# Patient Record
Sex: FEMALE | Race: WHITE | Employment: FULL TIME | URBAN - METROPOLITAN AREA
[De-identification: names, ages, dates, MRNs, and addresses within clinical notes are randomized per-mention and may not be internally consistent; named-entity substitution may affect disease eponyms.]

---

## 2019-11-06 ENCOUNTER — OFFICE VISIT (OUTPATIENT)
Dept: ORTHOPEDIC SURGERY | Facility: CLINIC | Age: 59
End: 2019-11-06

## 2019-11-06 VITALS
TEMPERATURE: 95.4 F | BODY MASS INDEX: 36.57 KG/M2 | HEART RATE: 85 BPM | WEIGHT: 233 LBS | RESPIRATION RATE: 16 BRPM | DIASTOLIC BLOOD PRESSURE: 79 MMHG | SYSTOLIC BLOOD PRESSURE: 123 MMHG | HEIGHT: 67 IN

## 2019-11-06 DIAGNOSIS — M17.12 PRIMARY OSTEOARTHRITIS OF LEFT KNEE: ICD-10-CM

## 2019-11-06 DIAGNOSIS — E66.01 SEVERE OBESITY (HCC): ICD-10-CM

## 2019-11-06 DIAGNOSIS — Z82.49 FAMILY HISTORY OF CHF (CONGESTIVE HEART FAILURE): ICD-10-CM

## 2019-11-06 DIAGNOSIS — M25.562 LEFT KNEE PAIN, UNSPECIFIED CHRONICITY: Primary | ICD-10-CM

## 2019-11-06 RX ORDER — ERGOCALCIFEROL 1.25 MG/1
50000 CAPSULE ORAL
COMMUNITY

## 2019-11-06 RX ORDER — METFORMIN HYDROCHLORIDE 1000 MG/1
1000 TABLET ORAL 2 TIMES DAILY WITH MEALS
COMMUNITY

## 2019-11-06 RX ORDER — BETAMETHASONE SODIUM PHOSPHATE AND BETAMETHASONE ACETATE 3; 3 MG/ML; MG/ML
6 INJECTION, SUSPENSION INTRA-ARTICULAR; INTRALESIONAL; INTRAMUSCULAR; SOFT TISSUE ONCE
Qty: 1 ML | Refills: 0
Start: 2019-11-06 | End: 2019-11-06

## 2019-11-06 RX ORDER — TRAMADOL HYDROCHLORIDE 50 MG/1
50 TABLET ORAL
COMMUNITY
End: 2020-07-28

## 2019-11-06 NOTE — PROGRESS NOTES
Patient: Monique Bhat                MRN: [id number]       SSN: [id number]  YOB: 1960        AGE: 63 y.o. SEX: female  Body mass index is 36.49 kg/m². PCP: None  11/06/19    HISTORY OF PRESENT ILLNESS:  I had the pleasure of reviewing Ms. Karen Dawson for opinion and advice in regards to her knees. She has had arthroscopies many years ago and the left knee is the worse of the two. They can be severe. Every step is painful. She does poorly at the grocery store. Difficulties with stairs. She has to go backwards going down stairs. She has pain at night, pain with activities of daily living. She is very restricted with them. The last injection lasted about 8 months. She is diabetic. She is otherwise quite healthy. She has been trying to lose some weight, but cannot exercise. Denies fevers, chills, night sweats. She is otherwise feeling well. REVIEW OF SYSTEMS:  A 12-point review of systems performed today. Pertinent positives noted. All other systems reviewed and otherwise are negative. PHYSICAL EXAMINATION:  On examination today, she is a very nice lady. Body mass index is 36.5. She moves her head and neck adequately. There is no respiratory compromise or indrawing. No scleral icterus or JVD. EOM normal.  The hips rotate nicely. Both knees are in varus. She walks with an antalgic gait going to the left side. She holds her leg fairly stiffly. She has a significant lateral thrust.  About a 3 degree fixed flexion deformity. She bends to about 110 degrees. Calf nontender. Mild Baker's cyst.  There is no footdrop. No cyanosis, peripheral edema or clubbing. Good pulse present distally. The right knee is similar although less severe. RADIOGRAPHS:  AP, tunnel, lateral and skyline confirm endstage arthritis with complete bone on bone contact medial and patellofemoral compartments with a lateral thrust and some mild lateral subluxation of the tibia and the femur. PROCEDURE NOTE:  Under aseptic conditions and after informed written consent with time out, left knee injected 1 mL Celestone preparation, i.e. 6 mg. IMPRESSION:  Endstage arthritis of the left knee. I think she should have a knee replacement when she would like. She would like to have an injection for now. She will return to see us next week and we can place an injection for the right knee as well. It is really up to her when she would like to have her knee replaced. CC:  Family Medicine         REVIEW OF SYSTEMS:      CON: negative for weight loss, fever  EYE: negative for double vision  ENT: negative for hoarseness  RS:   negative for Tb  GI:    negative for blood in stool  :  negative for blood in urine  Other systems reviewed and noted below. History reviewed. No pertinent past medical history. History reviewed. No pertinent family history. Current Outpatient Medications   Medication Sig Dispense Refill    metFORMIN (GLUCOPHAGE) 1,000 mg tablet Take 1,000 mg by mouth two (2) times daily (with meals).  traMADol (ULTRAM) 50 mg tablet Take 50 mg by mouth every six (6) hours as needed for Pain.  dulaglutide (TRULICITY) 1.5 FV/1.1 mL sub-q pen 1.5 mg by SubCUTAneous route every seven (7) days.  ergocalciferol (VITAMIN D2) 50,000 unit capsule Take 50,000 Units by mouth.  betamethasone (CELESTONE SOLUSPAN) 6 mg/mL injection 1 mL by Intra artICUlar route once for 1 dose. 1 mL 0       Not on File    History reviewed. No pertinent surgical history.     Social History     Socioeconomic History    Marital status: UNKNOWN     Spouse name: Not on file    Number of children: Not on file    Years of education: Not on file    Highest education level: Not on file   Occupational History    Not on file   Social Needs    Financial resource strain: Not on file    Food insecurity:     Worry: Not on file     Inability: Not on file    Transportation needs:     Medical: Not on file     Non-medical: Not on file   Tobacco Use    Smoking status: Never Smoker    Smokeless tobacco: Never Used   Substance and Sexual Activity    Alcohol use: Not on file    Drug use: Not on file    Sexual activity: Not on file   Lifestyle    Physical activity:     Days per week: Not on file     Minutes per session: Not on file    Stress: Not on file   Relationships    Social connections:     Talks on phone: Not on file     Gets together: Not on file     Attends Mandaeism service: Not on file     Active member of club or organization: Not on file     Attends meetings of clubs or organizations: Not on file     Relationship status: Not on file    Intimate partner violence:     Fear of current or ex partner: Not on file     Emotionally abused: Not on file     Physically abused: Not on file     Forced sexual activity: Not on file   Other Topics Concern    Not on file   Social History Narrative    Not on file       Visit Vitals  /79 (BP 1 Location: Left arm, BP Patient Position: Sitting)   Pulse 85   Temp 95.4 °F (35.2 °C) (Oral)   Resp 16   Ht 5' 7\" (1.702 m)   Wt 233 lb (105.7 kg)   BMI 36.49 kg/m²         PHYSICAL EXAMINATION:  GENERAL: Alert and oriented x3, in no acute distress, well-developed, well-nourished, afebrile. HEART: No JVD. EYES: No scleral icterus   NECK: No significant lymphadenopathy   LUNGS: No respiratory compromise or indrawing  ABDOMEN: Soft, non-tender, non-distended. Electronically signed by:  Daniel Rhodes MD

## 2019-11-06 NOTE — PROGRESS NOTES
Verbal order with read back given by Rios Olmedo. MD Jerrell on 11/6/2019 to draw up 1mL (6mg) of Celestone and 3mL of 1% Lidocaine.

## 2019-11-06 NOTE — PROGRESS NOTES
1. Have you been to the ER, urgent care clinic since your last visit? Hospitalized since your last visit? Yes, [facility] ED  & [facility] Medicine -in [address]  2. Have you seen or consulted any other health care providers outside of the [address] [place] [address] since your last visit? Include any pap smears or colon screening.  Yes, see above

## 2019-11-14 ENCOUNTER — OFFICE VISIT (OUTPATIENT)
Dept: ORTHOPEDIC SURGERY | Facility: CLINIC | Age: 59
End: 2019-11-14

## 2019-11-14 VITALS
HEIGHT: 67 IN | DIASTOLIC BLOOD PRESSURE: 78 MMHG | SYSTOLIC BLOOD PRESSURE: 112 MMHG | RESPIRATION RATE: 16 BRPM | HEART RATE: 88 BPM | BODY MASS INDEX: 36.03 KG/M2 | TEMPERATURE: 96.2 F | WEIGHT: 229.6 LBS

## 2019-11-14 DIAGNOSIS — M70.62 TROCHANTERIC BURSITIS, LEFT HIP: ICD-10-CM

## 2019-11-14 DIAGNOSIS — M17.11 PRIMARY OSTEOARTHRITIS OF RIGHT KNEE: Primary | ICD-10-CM

## 2019-11-14 DIAGNOSIS — M54.50 LUMBAR PAIN: ICD-10-CM

## 2019-11-14 RX ORDER — BETAMETHASONE SODIUM PHOSPHATE AND BETAMETHASONE ACETATE 3; 3 MG/ML; MG/ML
6 INJECTION, SUSPENSION INTRA-ARTICULAR; INTRALESIONAL; INTRAMUSCULAR; SOFT TISSUE ONCE
Qty: 1 ML | Refills: 0
Start: 2019-11-14 | End: 2019-11-14

## 2019-11-14 NOTE — PROGRESS NOTES
Patient: Monique Bhat                MRN: [id number]       SSN: [id number]  YOB: 1960        AGE: 63 y.o. SEX: female  Body mass index is 35.96 kg/m². PCP: None  11/14/19    HISTORY:  Ms Karen Dawson returns in followup for evaluation of back pain, hip pain, knee pain. We injected her left knee the last visit. It is helping. It hurts her to roll over on the left hip. No groin pain. It is moderate. Occasionally the back pain will radiate down to the knee. Denies fevers, chills, night sweats, or weight loss. She is requesting a right knee injection for moderate right knee pain that is nonradicular. She has known end-stage arthritis of both knees and is very interested in getting the left knee replaced in February or March, which I would be very happy to help her with. PHYSICAL EXAMINATION:  BMI is 36. Previous portal vents were clean and dry on the left knee. The left hip rotates nicely. Tender over the greater trochanter. Low back is tender as well. No foot drop. Straight-leg raise is just equivocal.  Examination of the right hip is negative. The right knee is varus, couple of degree fixed flexion deformity, slight effusion. PROCEDURE:  Under aseptic conditions after informed written consent with time-out, the right knee was injected with 1 mL of Celestone preparation (6 mg), well tolerated. PLAN:  Return to see us in a couple of weeks. We will get an AP pelvis and likely inject the left hip for bursitis _______________ [facility] as well. CC:  Family Medicine        REVIEW OF SYSTEMS:      CON: negative for weight loss, fever  EYE: negative for double vision  ENT: negative for hoarseness  RS:   negative for Tb  GI:    negative for blood in stool  :  negative for blood in urine  Other systems reviewed and noted below. History reviewed. No pertinent past medical history. History reviewed. No pertinent family history.     Current Outpatient Medications Medication Sig Dispense Refill    metFORMIN (GLUCOPHAGE) 1,000 mg tablet Take 1,000 mg by mouth two (2) times daily (with meals).  dulaglutide (TRULICITY) 1.5 JA/2.9 mL sub-q pen 1.5 mg by SubCUTAneous route every seven (7) days.  ergocalciferol (VITAMIN D2) 50,000 unit capsule Take 50,000 Units by mouth.  traMADol (ULTRAM) 50 mg tablet Take 50 mg by mouth every six (6) hours as needed for Pain. Not on File    History reviewed. No pertinent surgical history.     Social History     Socioeconomic History    Marital status: UNKNOWN     Spouse name: Not on file    Number of children: Not on file    Years of education: Not on file    Highest education level: Not on file   Occupational History    Not on file   Social Needs    Financial resource strain: Not on file    Food insecurity:     Worry: Not on file     Inability: Not on file    Transportation needs:     Medical: Not on file     Non-medical: Not on file   Tobacco Use    Smoking status: Never Smoker    Smokeless tobacco: Never Used   Substance and Sexual Activity    Alcohol use: Not on file    Drug use: Not on file    Sexual activity: Not on file   Lifestyle    Physical activity:     Days per week: Not on file     Minutes per session: Not on file    Stress: Not on file   Relationships    Social connections:     Talks on phone: Not on file     Gets together: Not on file     Attends Yarsani service: Not on file     Active member of club or organization: Not on file     Attends meetings of clubs or organizations: Not on file     Relationship status: Not on file    Intimate partner violence:     Fear of current or ex partner: Not on file     Emotionally abused: Not on file     Physically abused: Not on file     Forced sexual activity: Not on file   Other Topics Concern    Not on file   Social History Narrative    Not on file       Visit Vitals  /78   Pulse 88   Temp 96.2 °F (35.7 °C) (Oral)   Resp 16   Ht 5' 7\" (1.702 m)   Wt 229 lb 9.6 oz (104.1 kg)   BMI 35.96 kg/m²         PHYSICAL EXAMINATION:  GENERAL: Alert and oriented x3, in no acute distress, well-developed, well-nourished, afebrile. HEART: No JVD. EYES: No scleral icterus   NECK: No significant lymphadenopathy   LUNGS: No respiratory compromise or indrawing  ABDOMEN: Soft, non-tender, non-distended. Electronically signed by:  David Park MD

## 2019-11-14 NOTE — PROGRESS NOTES
1. Have you been to the ER, urgent care clinic since your last visit? Hospitalized since your last visit? No    2. Have you seen or consulted any other health care providers outside of the [address] [place] [address] since your last visit? Include any pap smears or colon screening.  No

## 2019-12-19 ENCOUNTER — OFFICE VISIT (OUTPATIENT)
Dept: ORTHOPEDIC SURGERY | Facility: CLINIC | Age: 59
End: 2019-12-19

## 2019-12-19 VITALS
HEIGHT: 67 IN | SYSTOLIC BLOOD PRESSURE: 118 MMHG | HEART RATE: 93 BPM | TEMPERATURE: 96.9 F | OXYGEN SATURATION: 97 % | DIASTOLIC BLOOD PRESSURE: 74 MMHG | BODY MASS INDEX: 35.6 KG/M2 | RESPIRATION RATE: 18 BRPM | WEIGHT: 226.8 LBS

## 2019-12-19 DIAGNOSIS — M70.62 TROCHANTERIC BURSITIS OF LEFT HIP: Primary | ICD-10-CM

## 2019-12-19 DIAGNOSIS — M54.50 LUMBAR PAIN: ICD-10-CM

## 2019-12-19 RX ORDER — METHYLPREDNISOLONE 4 MG/1
TABLET ORAL
Qty: 1 DOSE PACK | Refills: 0 | Status: SHIPPED | OUTPATIENT
Start: 2019-12-19 | End: 2020-07-08 | Stop reason: ALTCHOICE

## 2019-12-19 RX ORDER — CELECOXIB 200 MG/1
CAPSULE ORAL
COMMUNITY
Start: 2019-12-12 | End: 2020-07-28

## 2019-12-19 NOTE — PROGRESS NOTES
Patient: Wen Robert                MRN: [id number]       SSN: [id number]  YOB: 1960        AGE: 63 y.o. SEX: female  Body mass index is 35.52 kg/m². PCP: None  12/19/19    HISTORY OF PRESENT ILLNESS:  I had the pleasure of reviewing Karley Horne in follow-up in reevaluation of knee pain and also low back pain with radiculopathy down the left leg. When we came to interview her, she could not sit squarely in the chair as her back is really bothering her. The pain is moderate and moderately severe. No bowel or bladder problems. The knee injection only lasted a few weeks and has returned. She has known endstage arthritis involving both knees. She eventually is heading towards knee replacement surgery. She reports numbness and tingling going down the leg. No groin pain and not particularly sore to roll over on it at night. No recent trauma. REVIEW OF SYSTEMS:  A 12-point review of systems is performed today. Pertinent positives are noted. All other systems reviewed and otherwise are negative. PHYSICAL EXAMINATION:  On exam today, mildly antalgic gait going to the knee. There is no evidence for infection or DVT. Calf is nontender. Heather sign is negative. She has decreased sensation to L4. Tibialis anterior is 5-/5 on the left. Straight leg raise is borderline positive with a little bit of radiculopathy extending to the level just past the knee. Heather sign is negative. The low back is tender and the SI joint as well around 4-5, more on the left than on the right. She is in no acute distress and otherwise breathing normally. No scleral icterus or JVD. No significant lymphadenopathy. RADIOGRAPHS:  X-rays of the knees confirm severe arthritis both knees. The low lumbar spine AP and lateral confirm moderate degenerative disc disease with some mild foraminal stenosis.       IMPRESSION:  Endstage arthritis of the knees with radiculopathy and numbness and tingling going down the left leg and low back pain. I am going to obtain MRI, Medrol Dosepak, referral to the spine center. She will followup after. CC:  Family Medicine        REVIEW OF SYSTEMS:      CON: negative for weight loss, fever  EYE: negative for double vision  ENT: negative for hoarseness  RS:   negative for Tb  GI:    negative for blood in stool  :  negative for blood in urine  Other systems reviewed and noted below. Past Medical History:   Diagnosis Date    Diabetes [facility])        History reviewed. No pertinent family history. Current Outpatient Medications   Medication Sig Dispense Refill    celecoxib (CELEBREX) 200 mg capsule       methylPREDNISolone (MEDROL DOSEPACK) 4 mg tablet Per dose pack instructions 1 Dose Pack 0    metFORMIN (GLUCOPHAGE) 1,000 mg tablet Take 1,000 mg by mouth two (2) times daily (with meals).  dulaglutide (TRULICITY) 1.5 PW/7.1 mL sub-q pen 1.5 mg by SubCUTAneous route every seven (7) days.  traMADol (ULTRAM) 50 mg tablet Take 50 mg by mouth every six (6) hours as needed for Pain.  ergocalciferol (VITAMIN D2) 50,000 unit capsule Take 50,000 Units by mouth.          No Known Allergies    Past Surgical History:   Procedure Laterality Date    HX KNEE ARTHROSCOPY         Social History     Socioeconomic History    Marital status: UNKNOWN     Spouse name: Not on file    Number of children: Not on file    Years of education: Not on file    Highest education level: Not on file   Occupational History    Not on file   Social Needs    Financial resource strain: Not on file    Food insecurity:     Worry: Not on file     Inability: Not on file    Transportation needs:     Medical: Not on file     Non-medical: Not on file   Tobacco Use    Smoking status: Former Smoker    Smokeless tobacco: Never Used   Substance and Sexual Activity    Alcohol use: Not Currently    Drug use: Never    Sexual activity: Not on file   Lifestyle    Physical activity: Days per week: Not on file     Minutes per session: Not on file    Stress: Not on file   Relationships    Social connections:     Talks on phone: Not on file     Gets together: Not on file     Attends Religion service: Not on file     Active member of club or organization: Not on file     Attends meetings of clubs or organizations: Not on file     Relationship status: Not on file    Intimate partner violence:     Fear of current or ex partner: Not on file     Emotionally abused: Not on file     Physically abused: Not on file     Forced sexual activity: Not on file   Other Topics Concern    Not on file   Social History Narrative    Not on file       Visit Vitals  /74   Pulse 93   Temp 96.9 °F (36.1 °C) (Oral)   Resp 18   Ht 5' 7\" (1.702 m)   Wt 226 lb 12.8 oz (102.9 kg)   SpO2 97%   BMI 35.52 kg/m²         PHYSICAL EXAMINATION:  GENERAL: Alert and oriented x3, in no acute distress, well-developed, well-nourished, afebrile. HEART: No JVD. EYES: No scleral icterus   NECK: No significant lymphadenopathy   LUNGS: No respiratory compromise or indrawing  ABDOMEN: Soft, non-tender, non-distended. Electronically signed by:  Tuan Veloz MD

## 2020-02-19 ENCOUNTER — OFFICE VISIT (OUTPATIENT)
Dept: ORTHOPEDIC SURGERY | Facility: CLINIC | Age: 60
End: 2020-02-19

## 2020-02-19 VITALS
SYSTOLIC BLOOD PRESSURE: 113 MMHG | BODY MASS INDEX: 34.37 KG/M2 | DIASTOLIC BLOOD PRESSURE: 77 MMHG | HEART RATE: 85 BPM | TEMPERATURE: 96.6 F | OXYGEN SATURATION: 97 % | RESPIRATION RATE: 16 BRPM | WEIGHT: 219 LBS | HEIGHT: 67 IN

## 2020-02-19 DIAGNOSIS — M54.50 LUMBAR PAIN: ICD-10-CM

## 2020-02-19 DIAGNOSIS — M17.12 PRIMARY OSTEOARTHRITIS OF LEFT KNEE: Primary | ICD-10-CM

## 2020-02-19 RX ORDER — BETAMETHASONE SODIUM PHOSPHATE AND BETAMETHASONE ACETATE 3; 3 MG/ML; MG/ML
6 INJECTION, SUSPENSION INTRA-ARTICULAR; INTRALESIONAL; INTRAMUSCULAR; SOFT TISSUE ONCE
Qty: 1 ML | Refills: 0
Start: 2020-02-19 | End: 2020-02-19

## 2020-02-19 NOTE — PROGRESS NOTES
1. Have you been to the ER, urgent care clinic since your last visit? Hospitalized since your last visit? No    2. Have you seen or consulted any other health care providers outside of the [address] [place] [address] since your last visit? Include any pap smears or colon screening.  No

## 2020-02-19 NOTE — PROGRESS NOTES
Patient: Teddy Gamboa                MRN: [id number]       SSN: [id number]  YOB: 1960        AGE: 63 y.o. SEX: female  Body mass index is 34.3 kg/m². PCP: Justin Amezquita MD  02/19/20    HISTORY:  Rogelio Mckeon returns in follow-up with complaints of low back pain with radiculopathy going down the left leg, stops right at the level of the knee. She recently had an MRI of the back which is not available for our review today. No complaints of groin pain. Moderate-to-moderate-severe knee pain. She has known end-stage arthritis, both knees, requesting an injection for the left knee today. She is a known diabetic. We stage her injections. REVIEW OF SYSTEMS:  Again reviewed. No _______________ on the EMR. A 12-point scale, pertinent and positive noted. All other systems reviewed and are negative. PHYSICAL EXAMINATION:  Today she stands in bilateral varus, 5-10 degrees. Couple-degree fixed flexion deformity, lateral thrust.  Right hip is a touch stiff, but noncontributory. Left hip rotates normally. Negative exam for bursitis. The low back is quite tender around L4-5 on the left side. Straight-leg raise is just equivocal.  Good pulses. No cyanosis, peripheral edema, or clubbing. The skin is normal.  No evidence for infection or DVT. Homans sign negative and mild decrease of sensation L4 on the left side. RADIOGRAPHS:  X-rays of the knees reviewed, confirm severe end-stage arthritis, both knees. Bone-on-bone contact. The MRI is not available for us to review. PLAN:  I am going to take the liberty of referring her to [address] [place] [address] for definitive management of her back. It sounds mostly like mechanical symptoms, however, the MRI will be very helpful. PROCEDURE:  Under aseptic conditions and after informed, written consent with a time out, left knee injected with 1 mL of the Celestone preparation, i.e. 6 mg. PLAN:  Return next week.   We can re-evaluate the right knee and we can have a look at the MRI as well for her back. CC:  Hilary Wang MD        REVIEW OF SYSTEMS:      CON: negative for weight loss, fever  EYE: negative for double vision  ENT: negative for hoarseness  RS:   negative for Tb  GI:    negative for blood in stool  :  negative for blood in urine  Other systems reviewed and noted below. Past Medical History:   Diagnosis Date    Diabetes [facility])        History reviewed. No pertinent family history. Current Outpatient Medications   Medication Sig Dispense Refill    betamethasone (CELESTONE SOLUSPAN) 6 mg/mL injection 1 mL by Intra artICUlar route once for 1 dose. 1 mL 0    celecoxib (CELEBREX) 200 mg capsule       methylPREDNISolone (MEDROL DOSEPACK) 4 mg tablet Per dose pack instructions 1 Dose Pack 0    metFORMIN (GLUCOPHAGE) 1,000 mg tablet Take 1,000 mg by mouth two (2) times daily (with meals).  traMADol (ULTRAM) 50 mg tablet Take 50 mg by mouth every six (6) hours as needed for Pain.  dulaglutide (TRULICITY) 1.5 XO/6.2 mL sub-q pen 1.5 mg by SubCUTAneous route every seven (7) days.  ergocalciferol (VITAMIN D2) 50,000 unit capsule Take 50,000 Units by mouth.          No Known Allergies    Past Surgical History:   Procedure Laterality Date    HX KNEE ARTHROSCOPY         Social History     Socioeconomic History    Marital status: UNKNOWN     Spouse name: Not on file    Number of children: Not on file    Years of education: Not on file    Highest education level: Not on file   Occupational History    Not on file   Social Needs    Financial resource strain: Not on file    Food insecurity:     Worry: Not on file     Inability: Not on file    Transportation needs:     Medical: Not on file     Non-medical: Not on file   Tobacco Use    Smoking status: Former Smoker    Smokeless tobacco: Never Used   Substance and Sexual Activity    Alcohol use: Not Currently    Drug use: Never    Sexual activity: Not on file   Lifestyle    Physical activity:     Days per week: Not on file     Minutes per session: Not on file    Stress: Not on file   Relationships    Social connections:     Talks on phone: Not on file     Gets together: Not on file     Attends [employer] service: Not on file     Active member of club or organization: Not on file     Attends meetings of clubs or organizations: Not on file     Relationship status: Not on file    Intimate partner violence:     Fear of current or ex partner: Not on file     Emotionally abused: Not on file     Physically abused: Not on file     Forced sexual activity: Not on file   Other Topics Concern    Not on file   Social History Narrative    Not on file       Visit Vitals  /77   Pulse 85   Temp 96.6 °F (35.9 °C) (Oral)   Resp 16   Ht 5' 7\" (1.702 m)   Wt 219 lb (99.3 kg)   SpO2 97%   BMI 34.30 kg/m²         PHYSICAL EXAMINATION:  GENERAL: Alert and oriented x3, in no acute distress, well-developed, well-nourished, afebrile. HEART: No JVD. EYES: No scleral icterus   NECK: No significant lymphadenopathy   LUNGS: No respiratory compromise or indrawing  ABDOMEN: Soft, non-tender, non-distended. Electronically signed by:  Familia Constantino MD

## 2020-02-25 ENCOUNTER — OFFICE VISIT (OUTPATIENT)
Dept: ORTHOPEDIC SURGERY | Facility: CLINIC | Age: 60
End: 2020-02-25

## 2020-02-25 VITALS
OXYGEN SATURATION: 97 % | WEIGHT: 219 LBS | SYSTOLIC BLOOD PRESSURE: 99 MMHG | HEIGHT: 67 IN | HEART RATE: 82 BPM | DIASTOLIC BLOOD PRESSURE: 70 MMHG | BODY MASS INDEX: 34.37 KG/M2 | RESPIRATION RATE: 16 BRPM | TEMPERATURE: 97 F

## 2020-02-25 DIAGNOSIS — M54.50 LUMBAR PAIN: ICD-10-CM

## 2020-02-25 DIAGNOSIS — M17.11 PRIMARY OSTEOARTHRITIS OF RIGHT KNEE: Primary | ICD-10-CM

## 2020-02-25 RX ORDER — BETAMETHASONE SODIUM PHOSPHATE AND BETAMETHASONE ACETATE 3; 3 MG/ML; MG/ML
6 INJECTION, SUSPENSION INTRA-ARTICULAR; INTRALESIONAL; INTRAMUSCULAR; SOFT TISSUE ONCE
Qty: 1 ML | Refills: 0
Start: 2020-02-25 | End: 2020-02-25

## 2020-02-25 NOTE — PROGRESS NOTES
[address] [place] [address]  [phone]           Patient: Cathy Chairez                MRN: [id number]       SSN: [id number]  YOB: 1960        AGE: 63 y.o. SEX: female  Body mass index is 34.3 kg/m². PCP: Angelina Conner MD  02/25/20      This office note has been dictated. REVIEW OF SYSTEMS:  Constitutional: Negative for fever, chills, weight loss and malaise/fatigue. HENT: Negative. Eyes: Negative. Respiratory: Negative. Cardiovascular: Negative. Gastrointestinal: No bowel incontinence or constipation. Genitourinary: No bladder incontinence or saddle anesthesia. Skin: Negative. Neurological: Negative. Endo/Heme/Allergies: Negative. Psychiatric/Behavioral: Negative. Musculoskeletal: As per HPI above. Past Medical History:   Diagnosis Date    Diabetes [facility])          Current Outpatient Medications:     betamethasone (CELESTONE SOLUSPAN) 6 mg/mL injection, 1 mL by Intra artICUlar route once for 1 dose., Disp: 1 mL, Rfl: 0    celecoxib (CELEBREX) 200 mg capsule, , Disp: , Rfl:     methylPREDNISolone (MEDROL DOSEPACK) 4 mg tablet, Per dose pack instructions, Disp: 1 Dose Pack, Rfl: 0    metFORMIN (GLUCOPHAGE) 1,000 mg tablet, Take 1,000 mg by mouth two (2) times daily (with meals). , Disp: , Rfl:     traMADol (ULTRAM) 50 mg tablet, Take 50 mg by mouth every six (6) hours as needed for Pain., Disp: , Rfl:     dulaglutide (TRULICITY) 1.5 AI/6.4 mL sub-q pen, 1.5 mg by SubCUTAneous route every seven (7) days. , Disp: , Rfl:     ergocalciferol (VITAMIN D2) 50,000 unit capsule, Take 50,000 Units by mouth., Disp: , Rfl:     No Known Allergies    Social History     Socioeconomic History    Marital status: UNKNOWN     Spouse name: Not on file    Number of children: Not on file    Years of education: Not on file    Highest education level: Not on file   Occupational History    Not on file Social Needs    Financial resource strain: Not on file    Food insecurity:     Worry: Not on file     Inability: Not on file    Transportation needs:     Medical: Not on file     Non-medical: Not on file   Tobacco Use    Smoking status: Former Smoker    Smokeless tobacco: Never Used   Substance and Sexual Activity    Alcohol use: Not Currently    Drug use: Never    Sexual activity: Not on file   Lifestyle    Physical activity:     Days per week: Not on file     Minutes per session: Not on file    Stress: Not on file   Relationships    Social connections:     Talks on phone: Not on file     Gets together: Not on file     Attends Zoroastrianism service: Not on file     Active member of club or organization: Not on file     Attends meetings of clubs or organizations: Not on file     Relationship status: Not on file    Intimate partner violence:     Fear of current or ex partner: Not on file     Emotionally abused: Not on file     Physically abused: Not on file     Forced sexual activity: Not on file   Other Topics Concern    Not on file   Social History Narrative    Not on file       Past Surgical History:   Procedure Laterality Date    HX KNEE ARTHROSCOPY             * Patient was identified by name and date of birth   * Agreement on procedure being performed was verified  * Risks and Benefits explained to the patient  * Procedure site verified and marked as necessary  * Patient was positioned for comfort  * Consent was signed and verified  10:28 AM    The patient was instructed on post injection care. We did see Ms. Gaudencio Arndt today for followup with regards to complaints of low back pain, left lower extremity radiculopathy, as well as bilateral knee pain. The patient had a cortisone injection for the left knee by Dr. Evan Price last week. She presents today for reevaluation and is requesting a cortisone injection for the right knee. Her main problem is that of her back.   She has pain radiating down the lower extremity. She is requesting a referral over to the spine doctors. She is scheduled for surgery in April regarding her left knee. This may need to be postponed depending on her back issues. PHYSICAL EXAMINATION:  In general, the patient is alert and oriented x 3 in no acute distress. The patient is well-developed, well-nourished, with a normal affect. The patient is afebrile. HEENT:  Head is normocephalic and atraumatic. Pupils are equally round and reactive to light and accommodation. Extraocular eye movements are intact. Neck is supple. Trachea is midline. No JVD is present. Breathing is nonlabored. Examination of the lower extremities reveals pain-free range of motion of the hips. There is no pain to palpation of the greater trochanteric bursae. There is negative straight leg raise. There is negative calf tenderness. There is negative Heather's. There is no evidence of DVT present. Each knee reveals the skin is intact. There is no ecchymosis, no warmth, and no signs of infection or cellulitis present. She has findings consistent with advanced arthritis of each of the knees with pain to palpation tricompartmentally and crepitus arising from the anterior compartment. She does have some discomfort to palpation to the lower lumbar spine, as well as the left sciatic notch. ASSESSMENT:      1. Lumbar radiculopathy. 2. Bilateral knee osteoarthritis. PLAN:  At this point, we are going to get a referral over to [address] [place] [address] for further evaluation and treatment. With regards to the knees, we are going to move forward with a cortisone injection for the right knee today. After informed and written consent with an appropriate time out performed, and under sterile conditions, with ultrasound-guided assistance, the right knee was prepped with Betadine and 6 mg of Celestone was injected without complications. The patient tolerated the injection well.  The patient was instructed on post injection care. We will see her back in the office in about three months' time for evaluation. She does understand if her back issue is not resolved a couple of weeks prior to her knee replacement, we will have to postpone it because it may interfere with the results of the knee replacement.                       JR Mc SMITH, PA-C, ATC

## 2020-02-25 NOTE — PROGRESS NOTES
1. Have you been to the ER, urgent care clinic since your last visit? Hospitalized since your last visit? No    2. Have you seen or consulted any other health care providers outside of the [address] [place] [address] since your last visit? Include any pap smears or colon screening.  No

## 2020-03-12 PROBLEM — M46.1 SACROILIITIS, NOT ELSEWHERE CLASSIFIED (HCC): Status: ACTIVE | Noted: 2020-03-12

## 2020-06-03 ENCOUNTER — TELEPHONE (OUTPATIENT)
Dept: ORTHOPEDIC SURGERY | Age: 60
End: 2020-06-03

## 2020-06-03 NOTE — TELEPHONE ENCOUNTER
Patient is scheduled for surgery with ANAHI in July. She states she was told she will need to have someone with her for the first 5 days following surgery. Her son would like to tend to her but he will require a note from [address] [place] [address] stating the dates he needs to be available for her care for his job so that they will allow him to take the time off for her care. Please advise if this is possible and/or when ready.     Patient [phone]

## 2020-06-11 ENCOUNTER — DOCUMENTATION ONLY (OUTPATIENT)
Dept: ORTHOPEDIC SURGERY | Age: 60
End: 2020-06-11

## 2020-06-11 NOTE — PROGRESS NOTES
[employer] \"FMLA\" received via fax and placed in the forms bin at [facility] on 6/11/20

## 2020-06-15 ENCOUNTER — DOCUMENTATION ONLY (OUTPATIENT)
Dept: ORTHOPEDIC SURGERY | Age: 60
End: 2020-06-15

## 2020-06-15 NOTE — PROGRESS NOTES
FMLA form completed, faxed with confirmation, copy to scanning and patient advised she may  at [facility] location.

## 2020-06-29 ENCOUNTER — TELEPHONE (OUTPATIENT)
Dept: ORTHOPEDIC SURGERY | Facility: CLINIC | Age: 60
End: 2020-06-29

## 2020-06-29 NOTE — TELEPHONE ENCOUNTER
Patient called to follow up on Paid Family Leave paperwork left at [facility] office 6/25 for completion (for son). She was hoping to have completed for  by 7/3 as her son needs to return to his employer as soon as possible. Please advise patient when ready, [phone].

## 2020-07-06 DIAGNOSIS — Z01.818 PRE-OP TESTING: Primary | ICD-10-CM

## 2020-07-06 DIAGNOSIS — M17.12 PRIMARY OSTEOARTHRITIS OF LEFT KNEE: ICD-10-CM

## 2020-07-08 ENCOUNTER — OFFICE VISIT (OUTPATIENT)
Dept: ORTHOPEDIC SURGERY | Facility: CLINIC | Age: 60
End: 2020-07-08

## 2020-07-08 VITALS
WEIGHT: 207.4 LBS | DIASTOLIC BLOOD PRESSURE: 77 MMHG | BODY MASS INDEX: 32.55 KG/M2 | HEART RATE: 83 BPM | SYSTOLIC BLOOD PRESSURE: 120 MMHG | HEIGHT: 67 IN | TEMPERATURE: 96.4 F

## 2020-07-08 DIAGNOSIS — M17.12 ARTHRITIS OF LEFT KNEE: Primary | ICD-10-CM

## 2020-07-08 DIAGNOSIS — M17.11 PRIMARY OSTEOARTHRITIS OF RIGHT KNEE: ICD-10-CM

## 2020-07-08 DIAGNOSIS — Z01.818 PRE-OPERATIVE EXAMINATION: ICD-10-CM

## 2020-07-08 RX ORDER — CELECOXIB 100 MG/1
400 CAPSULE ORAL ONCE
Status: CANCELLED | OUTPATIENT
Start: 2020-07-08 | End: 2020-07-08

## 2020-07-08 RX ORDER — ACETAMINOPHEN 325 MG/1
1000 TABLET ORAL ONCE
Status: CANCELLED | OUTPATIENT
Start: 2020-07-08 | End: 2020-07-08

## 2020-07-08 RX ORDER — CHOLECALCIFEROL (VITAMIN D3) 125 MCG
CAPSULE ORAL
COMMUNITY
End: 2020-07-28

## 2020-07-08 RX ORDER — PREGABALIN 25 MG/1
75 CAPSULE ORAL ONCE
Status: CANCELLED | OUTPATIENT
Start: 2020-07-08 | End: 2020-07-08

## 2020-07-08 NOTE — PATIENT INSTRUCTIONS
Patient: Yolanda Howe                MRN: [id number]       SSN: [id number]  YOB: 1960        AGE: 63 y.o. SEX: female  Body mass index is 32.48 kg/m². 07/08/20    DO:  1:  Sit with the leg out straight 5-10 min every hour while awake. Keep the toes pointed       up towards the chau. 2.  Bend your knee 5-10 min every hour. Bend it to the point of pain and hold it for 5-10       Min. 3.  ICE your knee 20 min every hour    DO NOT:    1. Do not place anything under your knee to prop it up  2. Do not sit in the recliner chair.

## 2020-07-08 NOTE — H&P
[address] [place], [place] [address]           HISTORY & PHYSICAL      Patient: Robyne Hammans                MRN: [id number]       SSN: [id number]  YOB: 1960        AGE: 63 y.o. SEX: female  Body mass index is 32.48 kg/m². PCP: Treasure Fernandez MD  07/08/20      CC: left knee end stage OA  Problem List Items Addressed This Visit     None      Visit Diagnoses     Arthritis of left knee    -  Primary    Relevant Orders    AMB POC XRAY, KNEE; COMPLETE, 4+ VIEW    Pre-operative examination        Primary osteoarthritis of right knee        Relevant Medications    naproxen sodium (Aleve) 220 mg cap            HPI:  The patient is a pleasant 63 y.o. whom has end stage OA of their Left knee and has failed conservative treatment including but not limited to NSAIDS, cortisone injections, viscosupplementation, PT, and pain medicine. Due to the current findings and affected activities of daily living, surgical intervention is indicated. The alternatives, risks, complications, as well as expected outcome were discussed. These include but are not limited to infection, blood loss, need for blood transfusion, neurovascular damage, DVT, PE,  post-op stiffness and pain, leg length discrepancy, dislocation, anesthetic complications, prothesis longevity, need for more surgery, MI, stroke, and even death. The patient understands and wishes to proceed with surgery. Past Medical History:   Diagnosis Date    Diabetes ([facility] [address]          Current Outpatient Medications:     empagliflozin (JARDIANCE PO), Take  by mouth., Disp: , Rfl:     naproxen sodium (Aleve) 220 mg cap, Take  by mouth., Disp: , Rfl:     celecoxib (CELEBREX) 200 mg capsule, , Disp: , Rfl:     metFORMIN (GLUCOPHAGE) 1,000 mg tablet, Take 1,000 mg by mouth two (2) times daily (with meals). , Disp: , Rfl:     dulaglutide (TRULICITY) 1.5 AN/1.6 mL sub-q pen, 1.5 mg by SubCUTAneous route every seven (7) days. , Disp: , Rfl:     traMADol (ULTRAM) 50 mg tablet, Take 50 mg by mouth every six (6) hours as needed for Pain., Disp: , Rfl:     ergocalciferol (VITAMIN D2) 50,000 unit capsule, Take 50,000 Units by mouth., Disp: , Rfl:     No Known Allergies    Social History     Socioeconomic History    Marital status: UNKNOWN     Spouse name: Not on file    Number of children: Not on file    Years of education: Not on file    Highest education level: Not on file   Occupational History    Not on file   Social Needs    Financial resource strain: Not on file    Food insecurity     Worry: Not on file     Inability: Not on file    Transportation needs     Medical: Not on file     Non-medical: Not on file   Tobacco Use    Smoking status: Former Smoker    Smokeless tobacco: Never Used   Substance and Sexual Activity    Alcohol use: Not Currently    Drug use: Never    Sexual activity: Not on file   Lifestyle    Physical activity     Days per week: Not on file     Minutes per session: Not on file    Stress: Not on file   Relationships    Social connections     Talks on phone: Not on file     Gets together: Not on file     Attends Gnosticism service: Not on file     Active member of club or organization: Not on file     Attends meetings of clubs or organizations: Not on file     Relationship status: Not on file    Intimate partner violence     Fear of current or ex partner: Not on file     Emotionally abused: Not on file     Physically abused: Not on file     Forced sexual activity: Not on file   Other Topics Concern    Not on file   Social History Narrative    Not on file       Past Surgical History:   Procedure Laterality Date    HX KNEE ARTHROSCOPY         Family History:  Non-contributory.      PE:  Visit Vitals  /77   Pulse 83   Temp (!) 96.4 °F (35.8 °C)   Ht 5' 7\" (1.702 m)   Wt 207 lb 6.4 oz (94.1 kg)   BMI 32.48 kg/m²     A&O X3, NAD, well develop, well nourished  Heart: S1-S2, rrr  Lungs: CTA bilat  Abd: soft, nt, nt, + bs in all quadrants  Ext:  Pos distal pulses to DP, PT      X-ray: left knee shows end stage OA    Labs: labs pending    A:  Left  knee end stage OA    P:  At this point we will move forward with surgery. Again, the alternatives, risks, complications, as well as expected outcome were discussed and the patient wishes to proceed with surgery. Pt has been instructed to stop aspirin, nsaids, rheumatologic medications and blood thinners. They have also been instructed to continue on any heart and bp meds and to take them the morning of surgery with sips of water. The patient will require in-patient admission. Admission as an in-patient is reasonable and necessary due to increased risk of surgery due to the factors indicated as well as the possible need for prolonged in-hospital or skilled post-acute care in order to improve this patient's functional ability. The patient was counseled at length about the risks of patricia Covid-19 during their perioperative period and any recovery window from their procedure. The patient was made aware that patricia Covid-19  may worsen their prognosis for recovering from their procedure and lend to a higher morbidity and/or mortality risk. All material risks, benefits, and reasonable alternatives including postponing the procedure were discussed. The patient does  wish to proceed with the procedure at this time.             Paty Rendon

## 2020-07-08 NOTE — H&P (VIEW-ONLY)
[address], [address] [facility] [address] 
[phone] HISTORY & PHYSICAL Patient: Donovan Garcia                MRN: [id number]       SSN: [id number] YOB: 1960        AGE: 63 y.o. SEX: female Body mass index is 32.48 kg/m². PCP: Feroz Smith MD 
07/08/20 CC: left knee end stage OA Problem List Items Addressed This Visit None Visit Diagnoses Arthritis of left knee    -  Primary Relevant Orders AMB POC XRAY, KNEE; COMPLETE, 4+ VIEW Pre-operative examination Primary osteoarthritis of right knee Relevant Medications  
 naproxen sodium (Aleve) 220 mg cap HPI:  The patient is a pleasant 63 y.o. whom has end stage OA of their Left knee and has failed conservative treatment including but not limited to NSAIDS, cortisone injections, viscosupplementation, PT, and pain medicine. Due to the current findings and affected activities of daily living, surgical intervention is indicated. The alternatives, risks, complications, as well as expected outcome were discussed. These include but are not limited to infection, blood loss, need for blood transfusion, neurovascular damage, DVT, PE,  post-op stiffness and pain, leg length discrepancy, dislocation, anesthetic complications, prothesis longevity, need for more surgery, MI, stroke, and even death. The patient understands and wishes to proceed with surgery. Past Medical History:  
Diagnosis Date  Diabetes ([address] Current Outpatient Medications:  
  empagliflozin (JARDIANCE PO), Take  by mouth., Disp: , Rfl:  
  naproxen sodium (Aleve) 220 mg cap, Take  by mouth., Disp: , Rfl:  
  celecoxib (CELEBREX) 200 mg capsule, , Disp: , Rfl:  
  metFORMIN (GLUCOPHAGE) 1,000 mg tablet, Take 1,000 mg by mouth two (2) times daily (with meals). , Disp: , Rfl:  
   dulaglutide (TRULICITY) 1.5 XM/3.1 mL sub-q pen, 1.5 mg by SubCUTAneous route every seven (7) days. , Disp: , Rfl:  
  traMADol (ULTRAM) 50 mg tablet, Take 50 mg by mouth every six (6) hours as needed for Pain., Disp: , Rfl:  
  ergocalciferol (VITAMIN D2) 50,000 unit capsule, Take 50,000 Units by mouth., Disp: , Rfl:  
 
No Known Allergies Social History Socioeconomic History  Marital status: UNKNOWN Spouse name: Not on file  Number of children: Not on file  Years of education: Not on file  Highest education level: Not on file Occupational History  Not on file Social Needs  Financial resource strain: Not on file  Food insecurity Worry: Not on file Inability: Not on file  Transportation needs Medical: Not on file Non-medical: Not on file Tobacco Use  Smoking status: Former Smoker  Smokeless tobacco: Never Used Substance and Sexual Activity  Alcohol use: Not Currently  Drug use: Never  Sexual activity: Not on file Lifestyle  Physical activity Days per week: Not on file Minutes per session: Not on file  Stress: Not on file Relationships  Social connections Talks on phone: Not on file Gets together: Not on file Attends Jain service: Not on file Active member of club or organization: Not on file Attends meetings of clubs or organizations: Not on file Relationship status: Not on file  Intimate partner violence Fear of current or ex partner: Not on file Emotionally abused: Not on file Physically abused: Not on file Forced sexual activity: Not on file Other Topics Concern  Not on file Social History Narrative  Not on file Past Surgical History:  
Procedure Laterality Date  HX KNEE ARTHROSCOPY Family History:  Non-contributory. PE: 
Visit Vitals /77 Pulse 83 Temp (!) 96.4 °F (35.8 °C) Ht 5' 7\" (1.702 m) Wt 207 lb 6.4 oz (94.1 kg) BMI 32.48 kg/m² A&O X3, NAD, well develop, well nourished Heart: S1-S2, rrr 
Lungs: CTA bilat Abd: soft, nt, nt, + bs in all quadrants Ext:  Pos distal pulses to DP, PT 
 
 
X-ray: left knee shows end stage OA Labs: labs pending A:  Left  knee end stage OA 
 
P:  At this point we will move forward with surgery. Again, the alternatives, risks, complications, as well as expected outcome were discussed and the patient wishes to proceed with surgery. Pt has been instructed to stop aspirin, nsaids, rheumatologic medications and blood thinners. They have also been instructed to continue on any heart and bp meds and to take them the morning of surgery with sips of water. The patient will require in-patient admission. Admission as an in-patient is reasonable and necessary due to increased risk of surgery due to the factors indicated as well as the possible need for prolonged in-hospital or skilled post-acute care in order to improve this patient's functional ability. The patient was counseled at length about the risks of patricia Covid-19 during their perioperative period and any recovery window from their procedure. The patient was made aware that patricia Covid-19  may worsen their prognosis for recovering from their procedure and lend to a higher morbidity and/or mortality risk. All material risks, benefits, and reasonable alternatives including postponing the procedure were discussed. The patient does  wish to proceed with the procedure at this time. Ambrosio Osorio

## 2020-07-13 ENCOUNTER — HOSPITAL ENCOUNTER (OUTPATIENT)
Dept: GENERAL RADIOLOGY | Age: 60
Discharge: HOME | End: 2020-07-13
Payer: OTHER GOVERNMENT

## 2020-07-13 ENCOUNTER — HOSPITAL ENCOUNTER (OUTPATIENT)
Dept: PREADMISSION TESTING | Age: 60
Discharge: HOME | End: 2020-07-13
Payer: OTHER GOVERNMENT

## 2020-07-13 DIAGNOSIS — Z01.818 PRE-OP TESTING: ICD-10-CM

## 2020-07-13 DIAGNOSIS — M17.12 PRIMARY OSTEOARTHRITIS OF LEFT KNEE: ICD-10-CM

## 2020-07-13 LAB
ABO + RH BLD: NORMAL
ALBUMIN SERPL-MCNC: 4.4 G/DL (ref 3.4–5)
ANION GAP SERPL CALC-SCNC: 5 MMOL/L (ref 3–18)
APPEARANCE UR: CLEAR
APTT PPP: 26.2 SEC (ref 23–36.4)
ATRIAL RATE: 78 BPM
BASOPHILS # BLD: 0 K/UL (ref 0–0.1)
BASOPHILS NFR BLD: 0 % (ref 0–2)
BILIRUB UR QL: NEGATIVE
BLOOD GROUP ANTIBODIES SERPL: NORMAL
BUN SERPL-MCNC: 21 MG/DL (ref 7–18)
BUN/CREAT SERPL: 33 (ref 12–20)
CALCIUM SERPL-MCNC: 9.2 MG/DL (ref 8.5–10.1)
CALCULATED P AXIS, ECG09: 55 DEGREES
CALCULATED R AXIS, ECG10: 25 DEGREES
CALCULATED T AXIS, ECG11: 65 DEGREES
CHLORIDE SERPL-SCNC: 105 MMOL/L (ref 100–111)
CO2 SERPL-SCNC: 31 MMOL/L (ref 21–32)
COLOR UR: YELLOW
CREAT SERPL-MCNC: 0.63 MG/DL (ref 0.6–1.3)
DIAGNOSIS, 93000: NORMAL
DIFFERENTIAL METHOD BLD: NORMAL
EOSINOPHIL # BLD: 0.2 K/UL (ref 0–0.4)
EOSINOPHIL NFR BLD: 4 % (ref 0–5)
ERYTHROCYTE [DISTWIDTH] IN BLOOD BY AUTOMATED COUNT: 13 % (ref 11.6–14.5)
EST. AVERAGE GLUCOSE BLD GHB EST-MCNC: 128 MG/DL
GLUCOSE SERPL-MCNC: 82 MG/DL (ref 74–99)
GLUCOSE UR STRIP.AUTO-MCNC: >1000 MG/DL
HBA1C MFR BLD: 6.1 % (ref 4.2–5.6)
HCT VFR BLD AUTO: 44.4 % (ref 35–45)
HGB BLD-MCNC: 15.2 G/DL (ref 12–16)
HGB UR QL STRIP: NEGATIVE
INR PPP: 1 (ref 0.8–1.2)
KETONES UR QL STRIP.AUTO: ABNORMAL MG/DL
LEUKOCYTE ESTERASE UR QL STRIP.AUTO: NEGATIVE
LYMPHOCYTES # BLD: 2.2 K/UL (ref 0.9–3.6)
LYMPHOCYTES NFR BLD: 37 % (ref 21–52)
MCH RBC QN AUTO: 31.6 PG (ref 24–34)
MCHC RBC AUTO-ENTMCNC: 34.2 G/DL (ref 31–37)
MCV RBC AUTO: 92.3 FL (ref 74–97)
MONOCYTES # BLD: 0.3 K/UL (ref 0.05–1.2)
MONOCYTES NFR BLD: 5 % (ref 3–10)
NEUTS SEG # BLD: 3.2 K/UL (ref 1.8–8)
NEUTS SEG NFR BLD: 54 % (ref 40–73)
NITRITE UR QL STRIP.AUTO: NEGATIVE
P-R INTERVAL, ECG05: 166 MS
PH UR STRIP: 5 [PH] (ref 5–8)
PLATELET # BLD AUTO: 356 K/UL (ref 135–420)
PMV BLD AUTO: 10.4 FL (ref 9.2–11.8)
POTASSIUM SERPL-SCNC: 4.3 MMOL/L (ref 3.5–5.5)
PROT UR STRIP-MCNC: NEGATIVE MG/DL
PROTHROMBIN TIME: 12.7 SEC (ref 11.5–15.2)
Q-T INTERVAL, ECG07: 394 MS
QRS DURATION, ECG06: 80 MS
QTC CALCULATION (BEZET), ECG08: 449 MS
RBC # BLD AUTO: 4.81 M/UL (ref 4.2–5.3)
SODIUM SERPL-SCNC: 141 MMOL/L (ref 136–145)
SP GR UR REFRACTOMETRY: >1.03 (ref 1–1.03)
SPECIMEN EXP DATE BLD: NORMAL
UROBILINOGEN UR QL STRIP.AUTO: 0.2 EU/DL (ref 0.2–1)
VENTRICULAR RATE, ECG03: 78 BPM
WBC # BLD AUTO: 6 K/UL (ref 4.6–13.2)

## 2020-07-13 PROCEDURE — 83036 HEMOGLOBIN GLYCOSYLATED A1C: CPT

## 2020-07-13 PROCEDURE — 80048 BASIC METABOLIC PNL TOTAL CA: CPT

## 2020-07-13 PROCEDURE — 81003 URINALYSIS AUTO W/O SCOPE: CPT

## 2020-07-13 PROCEDURE — 85025 COMPLETE CBC W/AUTO DIFF WBC: CPT

## 2020-07-13 PROCEDURE — 85610 PROTHROMBIN TIME: CPT

## 2020-07-13 PROCEDURE — 86900 BLOOD TYPING SEROLOGIC ABO: CPT

## 2020-07-13 PROCEDURE — 85730 THROMBOPLASTIN TIME PARTIAL: CPT

## 2020-07-13 PROCEDURE — 36415 COLL VENOUS BLD VENIPUNCTURE: CPT

## 2020-07-13 PROCEDURE — 93005 ELECTROCARDIOGRAM TRACING: CPT

## 2020-07-13 PROCEDURE — 71046 X-RAY EXAM CHEST 2 VIEWS: CPT

## 2020-07-13 PROCEDURE — 87086 URINE CULTURE/COLONY COUNT: CPT

## 2020-07-13 PROCEDURE — 82040 ASSAY OF SERUM ALBUMIN: CPT

## 2020-07-14 DIAGNOSIS — Z01.818 PRE-OP TESTING: Primary | ICD-10-CM

## 2020-07-14 LAB
BACTERIA SPEC CULT: NORMAL
SERVICE CMNT-IMP: NORMAL

## 2020-07-22 ENCOUNTER — TELEPHONE (OUTPATIENT)
Dept: ORTHOPEDIC SURGERY | Age: 60
End: 2020-07-22

## 2020-07-22 ENCOUNTER — DOCUMENTATION ONLY (OUTPATIENT)
Dept: ORTHOPEDIC SURGERY | Facility: CLINIC | Age: 60
End: 2020-07-22

## 2020-07-22 ENCOUNTER — HOSPITAL ENCOUNTER (OUTPATIENT)
Dept: PREADMISSION TESTING | Age: 60
Discharge: HOME | End: 2020-07-22
Payer: OTHER GOVERNMENT

## 2020-07-22 DIAGNOSIS — Z01.818 PRE-OP TESTING: ICD-10-CM

## 2020-07-22 PROCEDURE — 87635 SARS-COV-2 COVID-19 AMP PRB: CPT

## 2020-07-22 NOTE — TELEPHONE ENCOUNTER
Fax [address]     Patient had Covid testing this morning and she was informed she needs to quarantine until Monday. She needs a letter for her employer please.       [phone]

## 2020-07-22 NOTE — LETTER
NOTIFICATION RETURN TO WORK / SCHOOL 
 
7/23/2020 8:31 AM 
 
[facility] [phone] [facility] [id number] To Whom It May Concern: 
 
Alex Childress is currently under the care of [facility] Nick Fisher. Due to her pre-op testing, she is on a no duty status starting 7-. If there are questions or concerns please have the patient contact our office. Sincerely, Vaishnavi Mclaughlin MD

## 2020-07-23 LAB — SARS-COV-2, COV2NT: NOT DETECTED

## 2020-07-24 ENCOUNTER — ANESTHESIA EVENT (OUTPATIENT)
Dept: SURGERY | Age: 60
DRG: 470 | End: 2020-07-24
Payer: OTHER GOVERNMENT

## 2020-07-27 ENCOUNTER — ANESTHESIA (OUTPATIENT)
Dept: SURGERY | Age: 60
DRG: 470 | End: 2020-07-27
Payer: OTHER GOVERNMENT

## 2020-07-27 ENCOUNTER — APPOINTMENT (OUTPATIENT)
Dept: GENERAL RADIOLOGY | Age: 60
DRG: 470 | End: 2020-07-27
Attending: ORTHOPAEDIC SURGERY
Payer: OTHER GOVERNMENT

## 2020-07-27 ENCOUNTER — HOSPITAL ENCOUNTER (INPATIENT)
Age: 60
LOS: 1 days | Discharge: HOME HEALTH | DRG: 470 | End: 2020-07-28
Attending: ORTHOPAEDIC SURGERY | Admitting: ORTHOPAEDIC SURGERY
Payer: OTHER GOVERNMENT

## 2020-07-27 DIAGNOSIS — M17.10 ARTHRITIS OF KNEE: Primary | ICD-10-CM

## 2020-07-27 LAB
GLUCOSE BLD STRIP.AUTO-MCNC: 118 MG/DL (ref 70–110)
GLUCOSE BLD STRIP.AUTO-MCNC: 133 MG/DL (ref 70–110)
GLUCOSE BLD STRIP.AUTO-MCNC: 148 MG/DL (ref 70–110)
GLUCOSE BLD STRIP.AUTO-MCNC: 149 MG/DL (ref 70–110)

## 2020-07-27 PROCEDURE — C1776 JOINT DEVICE (IMPLANTABLE): HCPCS | Performed by: ORTHOPAEDIC SURGERY

## 2020-07-27 PROCEDURE — 73560 X-RAY EXAM OF KNEE 1 OR 2: CPT

## 2020-07-27 PROCEDURE — 97530 THERAPEUTIC ACTIVITIES: CPT

## 2020-07-27 PROCEDURE — 76010000153 HC OR TIME 1.5 TO 2 HR: Performed by: ORTHOPAEDIC SURGERY

## 2020-07-27 PROCEDURE — 77030008683 HC TU ET CUF COVD -A: Performed by: ANESTHESIOLOGY

## 2020-07-27 PROCEDURE — 65270000029 HC RM PRIVATE

## 2020-07-27 PROCEDURE — 74011000258 HC RX REV CODE- 258: Performed by: ORTHOPAEDIC SURGERY

## 2020-07-27 PROCEDURE — 97161 PT EVAL LOW COMPLEX 20 MIN: CPT

## 2020-07-27 PROCEDURE — 77030019557 HC ELECTRD VES SEAL MEDT -F: Performed by: ORTHOPAEDIC SURGERY

## 2020-07-27 PROCEDURE — 77030027138 HC INCENT SPIROMETER -A: Performed by: ORTHOPAEDIC SURGERY

## 2020-07-27 PROCEDURE — 74011250637 HC RX REV CODE- 250/637: Performed by: ANESTHESIOLOGY

## 2020-07-27 PROCEDURE — 77030020274 HC MISC IMPL ORTHOPEDIC: Performed by: ORTHOPAEDIC SURGERY

## 2020-07-27 PROCEDURE — 74011250636 HC RX REV CODE- 250/636: Performed by: NURSE ANESTHETIST, CERTIFIED REGISTERED

## 2020-07-27 PROCEDURE — 77030013708 HC HNDPC SUC IRR PULS STRY –B: Performed by: ORTHOPAEDIC SURGERY

## 2020-07-27 PROCEDURE — 77030026438 HC STYL ET INTUB CARD -A: Performed by: ANESTHESIOLOGY

## 2020-07-27 PROCEDURE — 74011250637 HC RX REV CODE- 250/637: Performed by: PHYSICIAN ASSISTANT

## 2020-07-27 PROCEDURE — 77030003666 HC NDL SPINAL BD -A: Performed by: ORTHOPAEDIC SURGERY

## 2020-07-27 PROCEDURE — 77030006812 HC BLD SAW RECIP STRY -B: Performed by: ORTHOPAEDIC SURGERY

## 2020-07-27 PROCEDURE — 77030010785: Performed by: ORTHOPAEDIC SURGERY

## 2020-07-27 PROCEDURE — 76942 ECHO GUIDE FOR BIOPSY: CPT | Performed by: ANESTHESIOLOGY

## 2020-07-27 PROCEDURE — 0SRD0J9 REPLACEMENT OF LEFT KNEE JOINT WITH SYNTHETIC SUBSTITUTE, CEMENTED, OPEN APPROACH: ICD-10-PCS | Performed by: ORTHOPAEDIC SURGERY

## 2020-07-27 PROCEDURE — 77030012935 HC DRSG AQUACEL BMS -B: Performed by: ORTHOPAEDIC SURGERY

## 2020-07-27 PROCEDURE — 77030003029 HC SUT VCRL J&J -B: Performed by: ORTHOPAEDIC SURGERY

## 2020-07-27 PROCEDURE — 74011250636 HC RX REV CODE- 250/636: Performed by: PHYSICIAN ASSISTANT

## 2020-07-27 PROCEDURE — C9290 INJ, BUPIVACAINE LIPOSOME: HCPCS | Performed by: ORTHOPAEDIC SURGERY

## 2020-07-27 PROCEDURE — 77030000032 HC CUF TRNQT ZIMM -B: Performed by: ORTHOPAEDIC SURGERY

## 2020-07-27 PROCEDURE — 64450 NJX AA&/STRD OTHER PN/BRANCH: CPT | Performed by: ANESTHESIOLOGY

## 2020-07-27 PROCEDURE — 77030031139 HC SUT VCRL2 J&J -A: Performed by: ORTHOPAEDIC SURGERY

## 2020-07-27 PROCEDURE — 77030002933 HC SUT MCRYL J&J -A: Performed by: ORTHOPAEDIC SURGERY

## 2020-07-27 PROCEDURE — 74011250637 HC RX REV CODE- 250/637: Performed by: NURSE ANESTHETIST, CERTIFIED REGISTERED

## 2020-07-27 PROCEDURE — 74011250637 HC RX REV CODE- 250/637: Performed by: ORTHOPAEDIC SURGERY

## 2020-07-27 PROCEDURE — 74011000258 HC RX REV CODE- 258: Performed by: PHYSICIAN ASSISTANT

## 2020-07-27 PROCEDURE — 76210000016 HC OR PH I REC 1 TO 1.5 HR: Performed by: ORTHOPAEDIC SURGERY

## 2020-07-27 PROCEDURE — 74011250636 HC RX REV CODE- 250/636: Performed by: ANESTHESIOLOGY

## 2020-07-27 PROCEDURE — 76060000034 HC ANESTHESIA 1.5 TO 2 HR: Performed by: ORTHOPAEDIC SURGERY

## 2020-07-27 PROCEDURE — 74011250636 HC RX REV CODE- 250/636

## 2020-07-27 PROCEDURE — 77030006835 HC BLD SAW SAG STRY -B: Performed by: ORTHOPAEDIC SURGERY

## 2020-07-27 PROCEDURE — 77030040922 HC BLNKT HYPOTHRM STRY -A: Performed by: ORTHOPAEDIC SURGERY

## 2020-07-27 PROCEDURE — 74011000250 HC RX REV CODE- 250: Performed by: NURSE ANESTHETIST, CERTIFIED REGISTERED

## 2020-07-27 PROCEDURE — 77030019605: Performed by: ORTHOPAEDIC SURGERY

## 2020-07-27 PROCEDURE — 74011000250 HC RX REV CODE- 250: Performed by: PHYSICIAN ASSISTANT

## 2020-07-27 PROCEDURE — 77030018836 HC SOL IRR NACL ICUM -A: Performed by: ORTHOPAEDIC SURGERY

## 2020-07-27 PROCEDURE — 77030008462 HC STPLR SKN PROX J&J -A: Performed by: ORTHOPAEDIC SURGERY

## 2020-07-27 PROCEDURE — 82962 GLUCOSE BLOOD TEST: CPT

## 2020-07-27 PROCEDURE — 74011000250 HC RX REV CODE- 250: Performed by: ORTHOPAEDIC SURGERY

## 2020-07-27 PROCEDURE — 74011250636 HC RX REV CODE- 250/636: Performed by: ORTHOPAEDIC SURGERY

## 2020-07-27 DEVICE — COMPNT FEM PS CEM TRIATHLN 6 L --: Type: IMPLANTABLE DEVICE | Site: KNEE | Status: FUNCTIONAL

## 2020-07-27 DEVICE — PAT ASYM TRIATHLN X3 35X10MM -- TRIATHLON ASYMMETRIC X3: Type: IMPLANTABLE DEVICE | Site: KNEE | Status: FUNCTIONAL

## 2020-07-27 DEVICE — CEMENT BNE 20ML 41GM FULL DOSE PMMA W/ TOBRA M VISC RADPQ: Type: IMPLANTABLE DEVICE | Site: KNEE | Status: FUNCTIONAL

## 2020-07-27 DEVICE — BASEPLT TIB UNIV TRIATHLN 5 --: Type: IMPLANTABLE DEVICE | Site: KNEE | Status: FUNCTIONAL

## 2020-07-27 RX ORDER — AMOXICILLIN 250 MG
1 CAPSULE ORAL 2 TIMES DAILY
Status: DISCONTINUED | OUTPATIENT
Start: 2020-07-27 | End: 2020-07-28 | Stop reason: HOSPADM

## 2020-07-27 RX ORDER — FENTANYL CITRATE 50 UG/ML
50 INJECTION, SOLUTION INTRAMUSCULAR; INTRAVENOUS AS NEEDED
Status: DISCONTINUED | OUTPATIENT
Start: 2020-07-27 | End: 2020-07-27 | Stop reason: HOSPADM

## 2020-07-27 RX ORDER — GLYCOPYRROLATE 0.2 MG/ML
INJECTION INTRAMUSCULAR; INTRAVENOUS AS NEEDED
Status: DISCONTINUED | OUTPATIENT
Start: 2020-07-27 | End: 2020-07-27 | Stop reason: HOSPADM

## 2020-07-27 RX ORDER — SUCCINYLCHOLINE CHLORIDE 20 MG/ML
INJECTION INTRAMUSCULAR; INTRAVENOUS AS NEEDED
Status: DISCONTINUED | OUTPATIENT
Start: 2020-07-27 | End: 2020-07-27 | Stop reason: HOSPADM

## 2020-07-27 RX ORDER — POVIDONE-IODINE 10 %
SOLUTION, NON-ORAL TOPICAL AS NEEDED
Status: DISCONTINUED | OUTPATIENT
Start: 2020-07-27 | End: 2020-07-27 | Stop reason: HOSPADM

## 2020-07-27 RX ORDER — ASPIRIN 325 MG
325 TABLET, DELAYED RELEASE (ENTERIC COATED) ORAL 2 TIMES DAILY
Status: DISCONTINUED | OUTPATIENT
Start: 2020-07-28 | End: 2020-07-28 | Stop reason: HOSPADM

## 2020-07-27 RX ORDER — SCOLOPAMINE TRANSDERMAL SYSTEM 1 MG/1
1 PATCH, EXTENDED RELEASE TRANSDERMAL ONCE
Status: COMPLETED | OUTPATIENT
Start: 2020-07-27 | End: 2020-07-28

## 2020-07-27 RX ORDER — SODIUM CHLORIDE 0.9 % (FLUSH) 0.9 %
5-40 SYRINGE (ML) INJECTION AS NEEDED
Status: DISCONTINUED | OUTPATIENT
Start: 2020-07-27 | End: 2020-07-28 | Stop reason: HOSPADM

## 2020-07-27 RX ORDER — SODIUM CHLORIDE 0.9 % (FLUSH) 0.9 %
5-40 SYRINGE (ML) INJECTION EVERY 8 HOURS
Status: DISCONTINUED | OUTPATIENT
Start: 2020-07-27 | End: 2020-07-28 | Stop reason: HOSPADM

## 2020-07-27 RX ORDER — SODIUM CHLORIDE, SODIUM LACTATE, POTASSIUM CHLORIDE, CALCIUM CHLORIDE 600; 310; 30; 20 MG/100ML; MG/100ML; MG/100ML; MG/100ML
50 INJECTION, SOLUTION INTRAVENOUS CONTINUOUS
Status: DISCONTINUED | OUTPATIENT
Start: 2020-07-27 | End: 2020-07-27 | Stop reason: HOSPADM

## 2020-07-27 RX ORDER — FAMOTIDINE 20 MG/1
20 TABLET, FILM COATED ORAL ONCE
Status: COMPLETED | OUTPATIENT
Start: 2020-07-27 | End: 2020-07-27

## 2020-07-27 RX ORDER — SODIUM CHLORIDE 0.9 % (FLUSH) 0.9 %
5-40 SYRINGE (ML) INJECTION AS NEEDED
Status: DISCONTINUED | OUTPATIENT
Start: 2020-07-27 | End: 2020-07-27 | Stop reason: HOSPADM

## 2020-07-27 RX ORDER — PREGABALIN 25 MG/1
25 CAPSULE ORAL 3 TIMES DAILY
Status: DISCONTINUED | OUTPATIENT
Start: 2020-07-27 | End: 2020-07-28 | Stop reason: HOSPADM

## 2020-07-27 RX ORDER — DEXAMETHASONE SODIUM PHOSPHATE 4 MG/ML
INJECTION, SOLUTION INTRA-ARTICULAR; INTRALESIONAL; INTRAMUSCULAR; INTRAVENOUS; SOFT TISSUE AS NEEDED
Status: DISCONTINUED | OUTPATIENT
Start: 2020-07-27 | End: 2020-07-27 | Stop reason: HOSPADM

## 2020-07-27 RX ORDER — LIDOCAINE HYDROCHLORIDE 20 MG/ML
INJECTION, SOLUTION EPIDURAL; INFILTRATION; INTRACAUDAL; PERINEURAL AS NEEDED
Status: DISCONTINUED | OUTPATIENT
Start: 2020-07-27 | End: 2020-07-27 | Stop reason: HOSPADM

## 2020-07-27 RX ORDER — FENTANYL CITRATE 50 UG/ML
100 INJECTION, SOLUTION INTRAMUSCULAR; INTRAVENOUS ONCE
Status: COMPLETED | OUTPATIENT
Start: 2020-07-27 | End: 2020-07-27

## 2020-07-27 RX ORDER — ROPIVACAINE HYDROCHLORIDE 2 MG/ML
INJECTION, SOLUTION EPIDURAL; INFILTRATION; PERINEURAL
Status: COMPLETED | OUTPATIENT
Start: 2020-07-27 | End: 2020-07-27

## 2020-07-27 RX ORDER — CELECOXIB 200 MG/1
200 CAPSULE ORAL 2 TIMES DAILY
Qty: 60 CAP | Refills: 2 | Status: SHIPPED | OUTPATIENT
Start: 2020-07-27 | End: 2020-10-25

## 2020-07-27 RX ORDER — LABETALOL HCL 20 MG/4 ML
SYRINGE (ML) INTRAVENOUS AS NEEDED
Status: DISCONTINUED | OUTPATIENT
Start: 2020-07-27 | End: 2020-07-27 | Stop reason: HOSPADM

## 2020-07-27 RX ORDER — INSULIN LISPRO 100 [IU]/ML
INJECTION, SOLUTION INTRAVENOUS; SUBCUTANEOUS
Status: DISCONTINUED | OUTPATIENT
Start: 2020-07-27 | End: 2020-07-28 | Stop reason: HOSPADM

## 2020-07-27 RX ORDER — DEXTROSE 50 % IN WATER (D50W) INTRAVENOUS SYRINGE
25-50 AS NEEDED
Status: DISCONTINUED | OUTPATIENT
Start: 2020-07-27 | End: 2020-07-27 | Stop reason: HOSPADM

## 2020-07-27 RX ORDER — CEFAZOLIN SODIUM 2 G/50ML
2 SOLUTION INTRAVENOUS
Status: COMPLETED | OUTPATIENT
Start: 2020-07-27 | End: 2020-07-27

## 2020-07-27 RX ORDER — INSULIN LISPRO 100 [IU]/ML
INJECTION, SOLUTION INTRAVENOUS; SUBCUTANEOUS ONCE
Status: DISCONTINUED | OUTPATIENT
Start: 2020-07-27 | End: 2020-07-27 | Stop reason: HOSPADM

## 2020-07-27 RX ORDER — MAGNESIUM SULFATE 100 %
4 CRYSTALS MISCELLANEOUS AS NEEDED
Status: DISCONTINUED | OUTPATIENT
Start: 2020-07-27 | End: 2020-07-28 | Stop reason: HOSPADM

## 2020-07-27 RX ORDER — ROPIVACAINE HYDROCHLORIDE 2 MG/ML
30 INJECTION, SOLUTION EPIDURAL; INFILTRATION; PERINEURAL
Status: COMPLETED | OUTPATIENT
Start: 2020-07-27 | End: 2020-07-27

## 2020-07-27 RX ORDER — ZOLPIDEM TARTRATE 5 MG/1
5 TABLET ORAL
Status: DISCONTINUED | OUTPATIENT
Start: 2020-07-27 | End: 2020-07-28 | Stop reason: HOSPADM

## 2020-07-27 RX ORDER — DEXTROSE MONOHYDRATE 100 MG/ML
125-250 INJECTION, SOLUTION INTRAVENOUS AS NEEDED
Status: DISCONTINUED | OUTPATIENT
Start: 2020-07-27 | End: 2020-07-28 | Stop reason: HOSPADM

## 2020-07-27 RX ORDER — ONDANSETRON 2 MG/ML
4 INJECTION INTRAMUSCULAR; INTRAVENOUS ONCE
Status: COMPLETED | OUTPATIENT
Start: 2020-07-27 | End: 2020-07-27

## 2020-07-27 RX ORDER — MIDAZOLAM HYDROCHLORIDE 1 MG/ML
2 INJECTION, SOLUTION INTRAMUSCULAR; INTRAVENOUS ONCE
Status: COMPLETED | OUTPATIENT
Start: 2020-07-27 | End: 2020-07-27

## 2020-07-27 RX ORDER — CEPHALEXIN 500 MG/1
500 CAPSULE ORAL 4 TIMES DAILY
Qty: 8 CAP | Refills: 0 | Status: SHIPPED | OUTPATIENT
Start: 2020-07-27

## 2020-07-27 RX ORDER — SODIUM CHLORIDE 0.9 % (FLUSH) 0.9 %
5-40 SYRINGE (ML) INJECTION EVERY 8 HOURS
Status: DISCONTINUED | OUTPATIENT
Start: 2020-07-27 | End: 2020-07-27 | Stop reason: HOSPADM

## 2020-07-27 RX ORDER — MAGNESIUM SULFATE 100 %
4 CRYSTALS MISCELLANEOUS AS NEEDED
Status: DISCONTINUED | OUTPATIENT
Start: 2020-07-27 | End: 2020-07-27 | Stop reason: HOSPADM

## 2020-07-27 RX ORDER — SODIUM CHLORIDE 9 MG/ML
100 INJECTION, SOLUTION INTRAVENOUS CONTINUOUS
Status: DISCONTINUED | OUTPATIENT
Start: 2020-07-27 | End: 2020-07-28 | Stop reason: HOSPADM

## 2020-07-27 RX ORDER — NEOSTIGMINE METHYLSULFATE 1 MG/ML
INJECTION, SOLUTION INTRAVENOUS AS NEEDED
Status: DISCONTINUED | OUTPATIENT
Start: 2020-07-27 | End: 2020-07-27 | Stop reason: HOSPADM

## 2020-07-27 RX ORDER — DOCUSATE SODIUM 100 MG/1
100 CAPSULE, LIQUID FILLED ORAL 2 TIMES DAILY
Qty: 60 CAP | Refills: 2 | Status: SHIPPED | OUTPATIENT
Start: 2020-07-27 | End: 2020-10-25

## 2020-07-27 RX ORDER — ACETAMINOPHEN 500 MG
1000 TABLET ORAL ONCE
Status: COMPLETED | OUTPATIENT
Start: 2020-07-27 | End: 2020-07-27

## 2020-07-27 RX ORDER — OXYCODONE AND ACETAMINOPHEN 7.5; 325 MG/1; MG/1
1-2 TABLET ORAL
Qty: 56 TAB | Refills: 0 | Status: SHIPPED | OUTPATIENT
Start: 2020-07-27 | End: 2020-08-03

## 2020-07-27 RX ORDER — OXYCODONE HYDROCHLORIDE 5 MG/1
10-15 TABLET ORAL
Status: DISCONTINUED | OUTPATIENT
Start: 2020-07-27 | End: 2020-07-28 | Stop reason: HOSPADM

## 2020-07-27 RX ORDER — FENTANYL CITRATE 50 UG/ML
INJECTION, SOLUTION INTRAMUSCULAR; INTRAVENOUS AS NEEDED
Status: DISCONTINUED | OUTPATIENT
Start: 2020-07-27 | End: 2020-07-27 | Stop reason: HOSPADM

## 2020-07-27 RX ORDER — NALOXONE HYDROCHLORIDE 0.4 MG/ML
0.4 INJECTION, SOLUTION INTRAMUSCULAR; INTRAVENOUS; SUBCUTANEOUS AS NEEDED
Status: DISCONTINUED | OUTPATIENT
Start: 2020-07-27 | End: 2020-07-28 | Stop reason: HOSPADM

## 2020-07-27 RX ORDER — LANOLIN ALCOHOL/MO/W.PET/CERES
1 CREAM (GRAM) TOPICAL 2 TIMES DAILY WITH MEALS
Status: DISCONTINUED | OUTPATIENT
Start: 2020-07-27 | End: 2020-07-28 | Stop reason: HOSPADM

## 2020-07-27 RX ORDER — DEXTROSE 50 % IN WATER (D50W) INTRAVENOUS SYRINGE
25-50 AS NEEDED
Status: DISCONTINUED | OUTPATIENT
Start: 2020-07-27 | End: 2020-07-27 | Stop reason: RX

## 2020-07-27 RX ORDER — LIDOCAINE HYDROCHLORIDE 10 MG/ML
0.1 INJECTION, SOLUTION EPIDURAL; INFILTRATION; INTRACAUDAL; PERINEURAL AS NEEDED
Status: DISCONTINUED | OUTPATIENT
Start: 2020-07-27 | End: 2020-07-27 | Stop reason: HOSPADM

## 2020-07-27 RX ORDER — FLUMAZENIL 0.1 MG/ML
0.2 INJECTION INTRAVENOUS AS NEEDED
Status: DISCONTINUED | OUTPATIENT
Start: 2020-07-27 | End: 2020-07-28 | Stop reason: HOSPADM

## 2020-07-27 RX ORDER — ACETAMINOPHEN 500 MG
1000 TABLET ORAL EVERY 6 HOURS
Status: DISCONTINUED | OUTPATIENT
Start: 2020-07-27 | End: 2020-07-28 | Stop reason: HOSPADM

## 2020-07-27 RX ORDER — CELECOXIB 400 MG/1
400 CAPSULE ORAL ONCE
Status: COMPLETED | OUTPATIENT
Start: 2020-07-27 | End: 2020-07-27

## 2020-07-27 RX ORDER — VANCOMYCIN HYDROCHLORIDE 1 G/20ML
INJECTION, POWDER, LYOPHILIZED, FOR SOLUTION INTRAVENOUS AS NEEDED
Status: DISCONTINUED | OUTPATIENT
Start: 2020-07-27 | End: 2020-07-27 | Stop reason: HOSPADM

## 2020-07-27 RX ORDER — POLYMYXIN B 500000 [USP'U]/1
INJECTION, POWDER, LYOPHILIZED, FOR SOLUTION INTRAMUSCULAR; INTRATHECAL; INTRAVENOUS; OPHTHALMIC AS NEEDED
Status: DISCONTINUED | OUTPATIENT
Start: 2020-07-27 | End: 2020-07-27 | Stop reason: HOSPADM

## 2020-07-27 RX ORDER — ONDANSETRON 2 MG/ML
4 INJECTION INTRAMUSCULAR; INTRAVENOUS
Status: DISCONTINUED | OUTPATIENT
Start: 2020-07-27 | End: 2020-07-28 | Stop reason: HOSPADM

## 2020-07-27 RX ORDER — HYDROCODONE BITARTRATE AND ACETAMINOPHEN 5; 325 MG/1; MG/1
1 TABLET ORAL ONCE
Status: DISCONTINUED | OUTPATIENT
Start: 2020-07-27 | End: 2020-07-27 | Stop reason: HOSPADM

## 2020-07-27 RX ORDER — PROPOFOL 10 MG/ML
INJECTION, EMULSION INTRAVENOUS AS NEEDED
Status: DISCONTINUED | OUTPATIENT
Start: 2020-07-27 | End: 2020-07-27 | Stop reason: HOSPADM

## 2020-07-27 RX ORDER — ROCURONIUM BROMIDE 10 MG/ML
INJECTION, SOLUTION INTRAVENOUS AS NEEDED
Status: DISCONTINUED | OUTPATIENT
Start: 2020-07-27 | End: 2020-07-27 | Stop reason: HOSPADM

## 2020-07-27 RX ORDER — ONDANSETRON 2 MG/ML
INJECTION INTRAMUSCULAR; INTRAVENOUS AS NEEDED
Status: DISCONTINUED | OUTPATIENT
Start: 2020-07-27 | End: 2020-07-27 | Stop reason: HOSPADM

## 2020-07-27 RX ORDER — HYDROMORPHONE HYDROCHLORIDE 2 MG/ML
0.5 INJECTION, SOLUTION INTRAMUSCULAR; INTRAVENOUS; SUBCUTANEOUS
Status: DISCONTINUED | OUTPATIENT
Start: 2020-07-27 | End: 2020-07-27 | Stop reason: HOSPADM

## 2020-07-27 RX ORDER — CEFAZOLIN SODIUM 2 G/50ML
2 SOLUTION INTRAVENOUS EVERY 8 HOURS
Status: COMPLETED | OUTPATIENT
Start: 2020-07-27 | End: 2020-07-28

## 2020-07-27 RX ORDER — SODIUM CHLORIDE, SODIUM LACTATE, POTASSIUM CHLORIDE, CALCIUM CHLORIDE 600; 310; 30; 20 MG/100ML; MG/100ML; MG/100ML; MG/100ML
75 INJECTION, SOLUTION INTRAVENOUS CONTINUOUS
Status: DISCONTINUED | OUTPATIENT
Start: 2020-07-27 | End: 2020-07-27 | Stop reason: HOSPADM

## 2020-07-27 RX ORDER — ASPIRIN 325 MG
325 TABLET ORAL 2 TIMES DAILY
Qty: 60 TAB | Refills: 0 | Status: SHIPPED | OUTPATIENT
Start: 2020-07-27

## 2020-07-27 RX ORDER — CELECOXIB 100 MG/1
200 CAPSULE ORAL 2 TIMES DAILY
Status: DISCONTINUED | OUTPATIENT
Start: 2020-07-27 | End: 2020-07-28 | Stop reason: HOSPADM

## 2020-07-27 RX ORDER — PREGABALIN 75 MG/1
75 CAPSULE ORAL ONCE
Status: COMPLETED | OUTPATIENT
Start: 2020-07-27 | End: 2020-07-27

## 2020-07-27 RX ADMIN — ACETAMINOPHEN 1000 MG: 500 TABLET, FILM COATED ORAL at 08:37

## 2020-07-27 RX ADMIN — ACETAMINOPHEN 1000 MG: 500 TABLET ORAL at 17:42

## 2020-07-27 RX ADMIN — HYDROMORPHONE HYDROCHLORIDE 0.5 MG: 2 INJECTION, SOLUTION INTRAMUSCULAR; INTRAVENOUS; SUBCUTANEOUS at 12:45

## 2020-07-27 RX ADMIN — LIDOCAINE HYDROCHLORIDE 0.1 ML: 10 INJECTION, SOLUTION EPIDURAL; INFILTRATION; INTRACAUDAL; PERINEURAL at 09:09

## 2020-07-27 RX ADMIN — ONDANSETRON 4 MG: 2 INJECTION INTRAMUSCULAR; INTRAVENOUS at 12:22

## 2020-07-27 RX ADMIN — FENTANYL CITRATE 100 MCG: 50 INJECTION, SOLUTION INTRAMUSCULAR; INTRAVENOUS at 09:08

## 2020-07-27 RX ADMIN — MIDAZOLAM HYDROCHLORIDE 2 MG: 2 INJECTION, SOLUTION INTRAMUSCULAR; INTRAVENOUS at 09:08

## 2020-07-27 RX ADMIN — SODIUM CHLORIDE, SODIUM LACTATE, POTASSIUM CHLORIDE, AND CALCIUM CHLORIDE: 600; 310; 30; 20 INJECTION, SOLUTION INTRAVENOUS at 11:50

## 2020-07-27 RX ADMIN — ROCURONIUM BROMIDE 30 MG: 50 INJECTION INTRAVENOUS at 10:18

## 2020-07-27 RX ADMIN — HYDROMORPHONE HYDROCHLORIDE 0.5 MG: 2 INJECTION, SOLUTION INTRAMUSCULAR; INTRAVENOUS; SUBCUTANEOUS at 12:20

## 2020-07-27 RX ADMIN — FERROUS SULFATE TAB 325 MG (65 MG ELEMENTAL FE) 325 MG: 325 (65 FE) TAB at 17:41

## 2020-07-27 RX ADMIN — CELECOXIB 200 MG: 100 CAPSULE ORAL at 17:41

## 2020-07-27 RX ADMIN — CEFAZOLIN SODIUM 2 G: 2 SOLUTION INTRAVENOUS at 10:17

## 2020-07-27 RX ADMIN — ONDANSETRON 4 MG: 2 INJECTION INTRAMUSCULAR; INTRAVENOUS at 10:58

## 2020-07-27 RX ADMIN — SODIUM CHLORIDE, SODIUM LACTATE, POTASSIUM CHLORIDE, AND CALCIUM CHLORIDE 75 ML/HR: 600; 310; 30; 20 INJECTION, SOLUTION INTRAVENOUS at 08:35

## 2020-07-27 RX ADMIN — DEXAMETHASONE SODIUM PHOSPHATE 4 MG: 4 INJECTION, SOLUTION INTRAMUSCULAR; INTRAVENOUS at 10:16

## 2020-07-27 RX ADMIN — TRANEXAMIC ACID 1 G: 1 INJECTION, SOLUTION INTRAVENOUS at 11:31

## 2020-07-27 RX ADMIN — HYDROMORPHONE HYDROCHLORIDE 0.5 MG: 2 INJECTION, SOLUTION INTRAMUSCULAR; INTRAVENOUS; SUBCUTANEOUS at 13:00

## 2020-07-27 RX ADMIN — Medication 3 MG: at 11:52

## 2020-07-27 RX ADMIN — FAMOTIDINE 20 MG: 20 TABLET ORAL at 08:37

## 2020-07-27 RX ADMIN — PREGABALIN 25 MG: 25 CAPSULE ORAL at 17:41

## 2020-07-27 RX ADMIN — Medication 10 ML: at 21:53

## 2020-07-27 RX ADMIN — GLYCOPYRROLATE 0.4 MG: 0.2 INJECTION INTRAMUSCULAR; INTRAVENOUS at 11:52

## 2020-07-27 RX ADMIN — SENNOSIDES AND DOCUSATE SODIUM 1 TABLET: 8.6; 5 TABLET ORAL at 18:00

## 2020-07-27 RX ADMIN — CEFAZOLIN SODIUM 2 G: 2 SOLUTION INTRAVENOUS at 17:42

## 2020-07-27 RX ADMIN — ONDANSETRON 4 MG: 2 INJECTION INTRAMUSCULAR; INTRAVENOUS at 16:19

## 2020-07-27 RX ADMIN — CELECOXIB 400 MG: 400 CAPSULE ORAL at 08:37

## 2020-07-27 RX ADMIN — SUCCINYLCHOLINE CHLORIDE 140 MG: 20 INJECTION, SOLUTION INTRAMUSCULAR; INTRAVENOUS at 10:10

## 2020-07-27 RX ADMIN — PROPOFOL 200 MG: 10 INJECTION, EMULSION INTRAVENOUS at 10:09

## 2020-07-27 RX ADMIN — PREGABALIN 25 MG: 25 CAPSULE ORAL at 21:53

## 2020-07-27 RX ADMIN — ACETAMINOPHEN 1000 MG: 500 TABLET ORAL at 23:43

## 2020-07-27 RX ADMIN — LIDOCAINE HYDROCHLORIDE 60 MG: 20 INJECTION, SOLUTION EPIDURAL; INFILTRATION; INTRACAUDAL; PERINEURAL at 10:09

## 2020-07-27 RX ADMIN — FENTANYL CITRATE 100 MCG: 50 INJECTION, SOLUTION INTRAMUSCULAR; INTRAVENOUS at 10:09

## 2020-07-27 RX ADMIN — SODIUM CHLORIDE 100 ML/HR: 900 INJECTION, SOLUTION INTRAVENOUS at 14:00

## 2020-07-27 RX ADMIN — TRANEXAMIC ACID 1 G: 1 INJECTION, SOLUTION INTRAVENOUS at 10:21

## 2020-07-27 RX ADMIN — PREGABALIN 75 MG: 75 CAPSULE ORAL at 08:38

## 2020-07-27 RX ADMIN — LABETALOL 20 MG/4 ML (5 MG/ML) INTRAVENOUS SYRINGE 5 MG: at 10:37

## 2020-07-27 RX ADMIN — SODIUM CHLORIDE 100 ML/HR: 900 INJECTION, SOLUTION INTRAVENOUS at 23:44

## 2020-07-27 RX ADMIN — ROPIVACAINE HYDROCHLORIDE 60 MG: 2 INJECTION, SOLUTION EPIDURAL; INFILTRATION at 09:10

## 2020-07-27 RX ADMIN — ROPIVACAINE HYDROCHLORIDE 40 MG: 2 INJECTION, SOLUTION EPIDURAL; INFILTRATION at 09:17

## 2020-07-27 NOTE — ANESTHESIA POSTPROCEDURE EVALUATION
Procedure(s):  LEFT TOTAL KNEE ARTHROPLASTY. general, regional    Anesthesia Post Evaluation      Multimodal analgesia: multimodal analgesia used between 6 hours prior to anesthesia start to PACU discharge  Patient location during evaluation: bedside  Patient participation: complete - patient participated  Level of consciousness: awake  Pain score: 0  Pain management: adequate  Airway patency: patent  Anesthetic complications: no  Cardiovascular status: stable  Respiratory status: acceptable  Hydration status: acceptable  Post anesthesia nausea and vomiting:  controlled  Final Post Anesthesia Temperature Assessment:  Normothermia (36.0-37.5 degrees C)      INITIAL Post-op Vital signs:   Vitals Value Taken Time   /60 7/27/2020  1:13 PM   Temp 36.8 °C (98.3 °F) 7/27/2020  1:06 PM   Pulse 84 7/27/2020  1:21 PM   Resp 14 7/27/2020  1:21 PM   SpO2 95 % 7/27/2020  1:21 PM   Vitals shown include unvalidated device data.

## 2020-07-27 NOTE — ANESTHESIA PREPROCEDURE EVALUATION
Relevant Problems   No relevant active problems       Anesthetic History   No history of anesthetic complications            Review of Systems / Medical History  Patient summary reviewed and pertinent labs reviewed    Pulmonary                   Neuro/Psych   Within defined limits           Cardiovascular                  Exercise tolerance: >4 METS     GI/Hepatic/Renal  Within defined limits              Endo/Other    Diabetes: well controlled, type 2    Morbid obesity and arthritis     Other Findings              Physical Exam    Airway  Mallampati: II  TM Distance: 4 - 6 cm  Neck ROM: normal range of motion   Mouth opening: Normal     Cardiovascular  Regular rate and rhythm,  S1 and S2 normal,  no murmur, click, rub, or gallop             Dental  No notable dental hx       Pulmonary  Breath sounds clear to auscultation               Abdominal  GI exam deferred       Other Findings            Anesthetic Plan    ASA: 2  Anesthesia type: general and regional - femoral single shot          Induction: Intravenous  Anesthetic plan and risks discussed with: Patient

## 2020-07-27 NOTE — INTERVAL H&P NOTE
Update History & Physical 
 
The Patient's History and Physical of July 27, It was reviewed with the patient and I examined the patient. There was no change. The surgical site was confirmed by the patient and me. Plan:  The risk, benefits, expected outcome, and alternative to the recommended procedure have been discussed with the patient. Patient understands and wants to proceed with the procedure.  
 
Electronically signed by Benedetta Klinefelter, MD on 7/27/2020 at 9:08 AM

## 2020-07-27 NOTE — OP NOTES
[facility]  OPERATIVE REPORT    Name:  Mac Zhou  MR#:   [id number]  :  1960  ACCOUNT #:  [de-identified]  DATE OF SERVICE:  2020    PREOPERATIVE DIAGNOSIS:  End-stage arthritis of the left knee with fixed varus deformity. POSTOPERATIVE DIAGNOSIS:  End-stage arthritis of the left knee with fixed varus deformity. PROCEDURE PERFORMED:  Left total knee replacement using the Nelson Triathlon system, size 6 left posterior stabilized femoral component, size 5 tibia, size 5 10-mm tibial-bearing insert posterior stabilized, and a 35 asymmetric patella. SURGEON:  Layne Tay MD    FIRST ASSISTANT:  Ailyn Akers. SECOND ASSISTANT:  Moises Sanchez. ANESTHESIOLOGIST:  Dr. Rafa Orlando. ANESTHESIA:  Preoperative femoral nerve block with light general.    COMPLICATIONS:  No complications. SPECIMENS REMOVED:  No specimen. IMPLANTS:  As above mentioned. ESTIMATED BLOOD LOSS:  Less than 50 mL. Ailyn Akers was the first assistant who assisted with all phases of surgery commencing with patient positioning, patient prep, patient drape, leg positioning during surgery, retracting, assisting with the surgery itself, closure, dressing placement, and transfer. DESCRIPTION OF PROCEDURE:  After the anesthetic was successfully induced, it was confirmed the patient did receive preop antibiotics and time-out was performed. Midline incision. Knee debrided in the usual fashion. Femoral canal aspirated, lavaged, and re-aspirated prior to instrumentation, cut for 5 degrees for the appropriate size. The crab claw was utilized to prevent undercutting. All cuts were checked for squareness and all soft tissue structures protected during the sawing process. Femoral canal aspirated, lavaged, and re-aspirated prior to instrumentation. A modified gap balancing technique would be performed.     After preliminary femoral cut, we switched our attention to the tibia, used the external alignment guide with appropriate landmarks and resected enough to get a decent clean-up cut. Removed posterior osteophytes while protecting neurovascular bundle and used the Aquamantys and Exparel cocktail. We then did a fairly significant proximal medial release with decompression with medial osteophyte removal as well and modified gap balancing technique confirmed correct femoral rotation; we were beautifully balanced. We then did our femoral finishing followed by placement of the trial components to set our tibial rotation which was marked and later re-punched. Resurfaced the patella restoring patellar fitness anatomically and using a rongeur to smooth out the edges. With all the trial components in place, we checked the overall alignment, range of motion, soft tissue balance, patellar tracking, stability, alignment, all of which we were delighted with. Fashioned bone plug for the femoral canal.  Further control hemostasis. Cemented in the knee removing all extraneous cement, and holding the knee in full extension, cement was fully cured. Further cement removal and trialing. I was happiest with the 10, locked it in place, again reducing the knee. I let the tourniquet down. Routine closure. Fully flexed the knee prior to closure of the skin. At the end of the case, instrument, sponge, and needle count was correct. No complications. The patient tolerated the procedure well and blood loss less than 50. Excellent outcome to the case and I was delighted with the result. The patient was brought to the recovery room in stable condition.       Lori Silverio MD [id number]  D:  07/27/2020 11:47  T:  07/27/2020 13:24  JOB #:  [id number]

## 2020-07-27 NOTE — ANESTHESIA PROCEDURE NOTES
Peripheral Block    Start time: 7/27/2020 9:07 AM  End time: 7/27/2020 9:17 AM  Performed by: Jovan Martin MD  Authorized by: Jovan Martin MD       Pre-procedure: Indications: at surgeon's request, post-op pain management and procedure for pain    Preanesthetic Checklist: patient identified, risks and benefits discussed, site marked, timeout performed, anesthesia consent given and patient being monitored    Timeout Time: 09:07          Block Type:   Block Type: Adductor canal  Laterality:  Left  Monitoring:  Standard ASA monitoring, continuous pulse ox, frequent vital sign checks, oxygen, heart rate and responsive to questions  Injection Technique:  Single shot  Procedures: ultrasound guided and nerve stimulator    Patient Position: supine  Prep: chlorhexidine    Location:  Mid thigh  Needle Type:  Stimuplex  Needle Gauge:  21 G  Needle Localization:  Ultrasound guidance and nerve stimulator    Assessment:  Number of attempts:  1  Injection Assessment:  No paresthesia, incremental injection every 5 mL, ultrasound image on chart, no intravascular symptoms, negative aspiration for blood and local visualized surrounding nerve on ultrasound  Patient tolerance:  Patient tolerated the procedure well with no immediate complications  Location:  PREOP HOLDING    Patient given 2 mg IV Versed and 100 mcg IV Fentanyl for sedation.     7/27/2020     9:19 AM     Johnathan Pool MD

## 2020-07-27 NOTE — PROGRESS NOTES
Problem: Mobility Impaired (Adult and Pediatric)  Goal: *Acute Goals and Plan of Care (Insert Text)  Description: Physical Therapy Goals  Initiated 7/27/2020 and to be accomplished within 7 day(s)  1. Patient will move from supine to sit and sit to supine , scoot up and down, and roll side to side in bed with modified independence. 2.  Patient will transfer from bed to chair and chair to bed with supervision/set-up using the least restrictive device. 3.  Patient will perform sit to stand with supervision/set-up. 4.  Patient will ambulate with supervision/set-up for 100 feet with the least restrictive device. 5.  Patient will ascend/descend 13 stairs with 1 handrail(s) with supervision/set-up. PLOF: Pt lives in a two story home with 1 DEVIN, lives on second floor, living alone but son is in town to stay with her upon discharge, has a walker, still working. Outcome: Progressing Towards Goal   PHYSICAL THERAPY EVALUATION    Patient: Calli Adams (86 y.o. female)  Date: 7/27/2020  Primary Diagnosis: Osteoarthritis of left knee, unspecified osteoarthritis type [M17.12]  Arthritis of knee [M17.10]  Procedure(s) (LRB):  LEFT TOTAL KNEE ARTHROPLASTY (Left) Day of Surgery   Precautions:      WBAT      ASSESSMENT :  Patient is 64 yo F admitted to hospital for L TKA and presents today POD 0 and agreeable to therapy. Patient was educated on weight bearing status, role of therapy, TE (see below), and equipment in room including role of SCDs, towel roll, and ice sleeve. Patient demonstrated fair SLR and good quad set and transferred to sitting EOB for objective assessment. Patient reporting dizziness/lightheadedness/nausea when sitting EOB. Vitals taken with BP found to be 110/68 with SpO2 96% on RA. Patient reports she is a diabetic and has not eaten, nurse came to bedside to check glucose with reading of 149. Pt given saltine cracker and water and educated on HEP while sitting EOB.  She was able to do approx 5 laq while sitting EOB and was up in sitting 10-15 min, stated she had the urge to void but declined even BSC transfer and just wanted to lie back down. Pt was heavily educated on OOB activity but requested to lie back down due to not feeling well. At conclusion of session patient transferred to supine in bed and was left resting with call bell by the side, SCDs donned, and towel roll under ankle. Patient instructed to call for assistance if they needed to get up for any reason and denied need for further assistance. Patient demonstrates decreased strength, mobility, and endurance and would benefit from skilled PT in the acute setting [facility] upon discharge to ensure safe transition home. Patient will benefit from skilled intervention to address the above impairments. Patient's rehabilitation potential is considered to be Good  Factors which may influence rehabilitation potential include:   [x]         None noted  []         Mental ability/status  []         Medical condition  []         Home/family situation and support systems  []         Safety awareness  []         Pain tolerance/management  []         Other:      PLAN :  Recommendations and Planned Interventions:   [x]           Bed Mobility Training             [x]    Neuromuscular Re-Education  [x]           Transfer Training                   []    Orthotic/Prosthetic Training  [x]           Gait Training                          []    Modalities  [x]           Therapeutic Exercises           []    Edema Management/Control  [x]           Therapeutic Activities            [x]    Family Training/Education  [x]           Patient Education  []           Other (comment):    Frequency/Duration: Patient will be followed by physical therapy 1-2 times per day/4-7 days per week to address goals.   Discharge Recommendations: Home Health  Further Equipment Recommendations for Discharge: patient owns RW      SUBJECTIVE:   Patient stated I do not feel good at all, am I going to get food.     OBJECTIVE DATA SUMMARY:     Past Medical History:   Diagnosis Date    Diabetes ([facility] [address]      Past Surgical History:   Procedure Laterality Date    HX GYN      c section    HX KNEE ARTHROSCOPY Right     ankle, fx     HX ORTHOPAEDIC Bilateral     minicus repair      Barriers to Learning/Limitations: None  Compensate with: N/A  Home Situation:  Home Situation  Home Environment: Private residence  # Steps to Enter: 1  One/Two Story Residence: Two story  # of Interior Steps: 15  Interior Rails: None  Lift Chair Available: No  Living Alone: Yes  Support Systems: Family member(s)  Patient Expects to be Discharged to[de-identified] Private residence  Current DME Used/Available at Home: [place] Behavior:  Neurologic State: Alert;Drowsy  Orientation Level: Oriented X4  Cognition: Follows commands  Safety/Judgement: Fall prevention           Skin Integrity: Incision (comment)(left knee)  Skin Integumentary  Skin Integrity: Incision (comment)(left knee)     Strength:    Strength: Generally decreased, functional(L LE )        Tone & Sensation:   Tone: Normal    Sensation: Intact    Range Of Motion:  AROM: Generally decreased, functional(L LE )           PROM: Generally decreased, functional(L LE )    Functional Mobility:  Bed Mobility:     Supine to Sit: Contact guard assistance  Sit to Supine: Contact guard assistance  Scooting: Contact guard assistance  Transfers:  Sit to Stand: (NT this date )    Balance:   Sitting: Intact; With support    Ambulation/Gait Training:    Left Side Weight Bearing: As tolerated       Therapeutic Exercises:   Pt educated in total knee HEP with emphasis on heel slides, ankle pumps, glute sets, quad sets. Pain:  Pain level pre-treatment: reports no pain, L knee stiffness and soreness with movement  Pain level post-treatment: \"    \"   Pain Intervention(s) : Medication (see MAR);  Rest, Ice, Repositioning  Response to intervention: Nurse notified    Activity Tolerance:   Fair    Please refer to the flowsheet for vital signs taken during this treatment. After treatment:   []         Patient left in no apparent distress sitting up in chair  [x]         Patient left in no apparent distress in bed  [x]         Call bell left within reach  [x]         Nursing notified  []         Caregiver present  []         Bed alarm activated  []         SCDs applied    COMMUNICATION/EDUCATION:   [x]         Role of Physical Therapy in the acute care setting. [x]         Fall prevention education was provided and the patient/caregiver indicated understanding. [x]         Patient/family have participated as able in goal setting and plan of care. [x]         Patient/family agree to work toward stated goals and plan of care. []         Patient understands intent and goals of therapy, but is neutral about his/her participation. []         Patient is unable to participate in goal setting/plan of care: ongoing with therapy staff.  []         Other:     Thank you for this referral.  Lupe Steve   Time Calculation: 28 mins      Eval Complexity: History: MEDIUM  Complexity : 1-2 comorbidities / personal factors will impact the outcome/ POC Exam:LOW Complexity : 1-2 Standardized tests and measures addressing body structure, function, activity limitation and / or participation in recreation  Presentation: LOW Complexity : Stable, uncomplicated  Clinical Decision Making:Low Complexity    Overall Complexity:LOW

## 2020-07-27 NOTE — DISCHARGE SUMMARY
7/27/2020  7:02 AM    7/28/2020, 12:02 PM    Primary Dx:left Orthopedic / Rheumatologic: Total Knee Replacement  Secondary Dx: Etiological Diagnoses: none    HPI:  Pt has end stage OA and had failed conservative treatment. Due to the current findings and affected activity of daily living surgical intervention is indicated.   The alternatives, risks, complications as well as expected outcome were discussed, the patient understands and wishes to proceed with surgery    Past Medical History:   Diagnosis Date    Diabetes ([facility] [address]          Current Facility-Administered Medications:     lidocaine (PF) (XYLOCAINE) 10 mg/mL (1 %) injection 0.1 mL, 0.1 mL, SubCUTAneous, PRN, Arn Raring, CRNA, 0.1 mL at 07/27/20 0909    lactated Ringers infusion, 75 mL/hr, IntraVENous, CONTINUOUS, Arn Raring, CRNA, Last Rate: 75 mL/hr at 07/27/20 0835    sodium chloride (NS) flush 5-40 mL, 5-40 mL, IntraVENous, Q8H, Danielito Walton CRNA    sodium chloride (NS) flush 5-40 mL, 5-40 mL, IntraVENous, PRN, Arn Raring, CRNA    insulin lispro (HUMALOG) injection, , SubCUTAneous, ONCE, Joselin Walton CRNA, Stopped at 07/27/20 0800    glucose chewable tablet 16 g, 4 Tab, Oral, PRN, Arn Raring, CRNA    glucagon (GLUCAGEN) injection 1 mg, 1 mg, IntraMUSCular, PRN, Arn Raring, CRNA    dextrose (D50W) injection syrg 12.5-25 g, 25-50 mL, IntraVENous, PRN, Arn Marco Aing, CRNA    bupivacaine liposome (PF) susp (EXPAREL) infiltration 266 mg, 20 mL, Infiltration, ONCE, Víctor Bentley PA-C    scopolamine (TRANSDERM-SCOP) 1 mg over 3 days 1 Patch, 1 Patch, TransDERmal, ONCE, Meme Rojas MD, 1 Patch at 07/27/20 0906    bupivacaine (PF) 0.5 % (5 mg/mL) 25 mL, EPINEPHrine HCl (PF) 0.5 mg, ketorolac 30 mg, bupivacaine liposome (PF) susp 20 mL in 0.9% sodium chloride 50 mL iv solution, , , PRN, Mily Benton MD, 96.5 mL at 07/27/20 1117    vancomycin (VANCOCIN) injection, , , PRN, Mily Benton MD, 3 g at 07/27/20 1037    sodium chloride 0.9 % bolus infusion, , , CONTINUOUS, Heath Allan MD, 500 mL at 07/27/20 1038    polymyxin B 500,000 unit injection, , , PRN, Molly Khan MD, 750,000 Units at 07/27/20 1038    povidone-iodine (BETADINE) 10 % topical solution, , , PRN, Molly Khan MD, 17.5 mL at 07/27/20 1039    Facility-Administered Medications Ordered in Other Encounters:     fentaNYL citrate (PF) injection, , IntraVENous, PRN, Sumi Stallion, CRNA, 100 mcg at 07/27/20 1009    lidocaine (PF) (XYLOCAINE) 20 mg/mL (2 %) injection, , IntraVENous, PRN, Sumi Stallion, CRNA, 60 mg at 07/27/20 1009    propofoL (DIPRIVAN) 10 mg/mL injection, , IntraVENous, PRN, Sumi Stallion, CRNA, 200 mg at 07/27/20 1009    succinylcholine (ANECTINE) injection, , IntraVENous, PRN, Sumi Stallion, CRNA, 140 mg at 07/27/20 1010    rocuronium injection, , IntraVENous, PRN, Sumi Stallion, CRNA, 30 mg at 07/27/20 1018    dexamethasone (DECADRON) 4 mg/mL injection, , , PRN, Sumi Stallion, CRNA, 4 mg at 07/27/20 1016    labetaloL (NORMODYNE;TRANDATE) 20 mg/4 mL (5 mg/mL) injection, , , PRN, Sumi Stallion, CRNA, 5 mg at 07/27/20 1037    ondansetron (ZOFRAN) injection, , IntraVENous, PRN, Sumi Stallion, CRNA, 4 mg at 07/27/20 1058    neostigmine (PROSTIGMINE) 1 mg/mL syringe, , IntraVENous, PRN, Sumi Stallion, CRNA, 3 mg at 07/27/20 1152    glycopyrrolate (ROBINUL) injection, , IntraVENous, PRN, Sumi Stallion, CRNA, 0.4 mg at 07/27/20 1152    Patient has no known allergies. Physical Exam:  General A&O x3 NAD, well developed, well nourished, normal affect  Heart: S1-S2, RRR  Lungs: CTA Bilat  Abd: soft NT, ND  Ext: n/v intact    Hospital Course:    Pt. Had leftOrthopedic / Rheumatologic: Total Knee Replacement    Post -op Course: The patient tolerated the procedure well. They were followed by internal medicine for help with medical management. Pt.  Was place on Abx pre and post-op for prophylaxis against infection as well as coumadin pre and post-op for prophylaxis against DVT. Vitals signs remained stable, remained af. The wound wasclean, dry, no drainage. Pain was well controlled. Pt. Had negative calf tenderness or swelling, no evidence for DVT. Patient had PT/OT consult for evaluation and treatment. CBC  Lab Results   Component Value Date/Time    WBC 6.0 07/13/2020 09:01 AM    RBC 4.81 07/13/2020 09:01 AM    HCT 44.4 07/13/2020 09:01 AM    MCV 92.3 07/13/2020 09:01 AM    MCH 31.6 07/13/2020 09:01 AM    MCHC 34.2 07/13/2020 09:01 AM    RDW 13.0 07/13/2020 09:01 AM     Coagulation  Lab Results   Component Value Date    INR 1.0 07/13/2020    APTT 26.2 07/13/2020      Basic Metabolic Profile  Lab Results   Component Value Date     07/13/2020    CO2 31 07/13/2020    BUN 21 (H) 07/13/2020       Discharge Meds:  Current Discharge Medication List      START taking these medications    Details   oxyCODONE-acetaminophen (Percocet) 7.5-325 mg per tablet Take 1-2 Tabs by mouth every six (6) hours as needed for Pain for up to 7 days. Max Daily Amount: 8 Tabs. Qty: 56 Tab, Refills: 0    Associated Diagnoses: Arthritis of knee      aspirin (ASPIRIN) 325 mg tablet Take 1 Tab by mouth two (2) times a day. Qty: 60 Tab, Refills: 0    Associated Diagnoses: Arthritis of knee      docusate sodium (Colace) 100 mg capsule Take 1 Cap by mouth two (2) times a day for 90 days. Qty: 60 Cap, Refills: 2    Associated Diagnoses: Arthritis of knee      cephALEXin (Keflex) 500 mg capsule Take 1 Cap by mouth four (4) times daily. Qty: 8 Cap, Refills: 0    Associated Diagnoses: Arthritis of knee         CONTINUE these medications which have CHANGED    Details   celecoxib (CeleBREX) 200 mg capsule Take 1 Cap by mouth two (2) times a day for 90 days.   Qty: 60 Cap, Refills: 2    Associated Diagnoses: Arthritis of knee         CONTINUE these medications which have NOT CHANGED    Details   empagliflozin (JARDIANCE PO) Take  by mouth.      metFORMIN (GLUCOPHAGE) 1,000 mg tablet Take 1,000 mg by mouth two (2) times daily (with meals). dulaglutide (TRULICITY) 1.5 XV/6.4 mL sub-q pen 1.5 mg by SubCUTAneous route every seven (7) days. ergocalciferol (VITAMIN D2) 50,000 unit capsule Take 50,000 Units by mouth. STOP taking these medications       naproxen sodium (Aleve) 220 mg cap Comments:   Reason for Stopping:         traMADol (ULTRAM) 50 mg tablet Comments:   Reason for Stopping:               Discharge Plan:  The patient will be d/c'd to home, total knee protocol, WBAT. She will have [facility] PT and nursing. Total joint protocol. Pt safe for homebound transfer, sp Total joint replacement. A walker, bedside commode, and shower chair will be utilized for ADL's. Follow up with Dr. Wen Gonzalez in 10-12 days. Call with any questions or concerns.

## 2020-07-27 NOTE — PROGRESS NOTES
PT eval order received and chart reviewed. Unable to see patient at this time as she is still drowsy from surgery and having a hard time fully waking to participate, requests to come back later. Will follow up as patient schedule allows. Thank you for this referral. Shelly Knight, PT, DPT.

## 2020-07-28 ENCOUNTER — HOME HEALTH ADMISSION (OUTPATIENT)
Dept: HOME HEALTH SERVICES | Facility: HOME HEALTH | Age: 60
End: 2020-07-28
Payer: OTHER GOVERNMENT

## 2020-07-28 VITALS
HEART RATE: 89 BPM | SYSTOLIC BLOOD PRESSURE: 96 MMHG | OXYGEN SATURATION: 90 % | TEMPERATURE: 98.5 F | DIASTOLIC BLOOD PRESSURE: 59 MMHG | RESPIRATION RATE: 18 BRPM | HEIGHT: 67 IN | BODY MASS INDEX: 31.45 KG/M2 | WEIGHT: 200.4 LBS

## 2020-07-28 LAB — GLUCOSE BLD STRIP.AUTO-MCNC: 119 MG/DL (ref 70–110)

## 2020-07-28 PROCEDURE — 97116 GAIT TRAINING THERAPY: CPT

## 2020-07-28 PROCEDURE — 74011250637 HC RX REV CODE- 250/637: Performed by: ORTHOPAEDIC SURGERY

## 2020-07-28 PROCEDURE — 97535 SELF CARE MNGMENT TRAINING: CPT

## 2020-07-28 PROCEDURE — 82962 GLUCOSE BLOOD TEST: CPT

## 2020-07-28 PROCEDURE — 97530 THERAPEUTIC ACTIVITIES: CPT

## 2020-07-28 PROCEDURE — 97165 OT EVAL LOW COMPLEX 30 MIN: CPT

## 2020-07-28 PROCEDURE — 74011250636 HC RX REV CODE- 250/636: Performed by: ORTHOPAEDIC SURGERY

## 2020-07-28 RX ADMIN — CELECOXIB 200 MG: 100 CAPSULE ORAL at 08:31

## 2020-07-28 RX ADMIN — CEFAZOLIN SODIUM 2 G: 2 SOLUTION INTRAVENOUS at 02:05

## 2020-07-28 RX ADMIN — Medication 10 ML: at 05:34

## 2020-07-28 RX ADMIN — CEFAZOLIN SODIUM 2 G: 2 SOLUTION INTRAVENOUS at 10:06

## 2020-07-28 RX ADMIN — ACETAMINOPHEN 1000 MG: 500 TABLET ORAL at 11:14

## 2020-07-28 RX ADMIN — PREGABALIN 25 MG: 25 CAPSULE ORAL at 08:31

## 2020-07-28 RX ADMIN — OXYCODONE HYDROCHLORIDE 10 MG: 5 TABLET ORAL at 06:23

## 2020-07-28 RX ADMIN — OXYCODONE HYDROCHLORIDE 10 MG: 5 TABLET ORAL at 03:14

## 2020-07-28 RX ADMIN — SENNOSIDES AND DOCUSATE SODIUM 1 TABLET: 8.6; 5 TABLET ORAL at 08:31

## 2020-07-28 RX ADMIN — FERROUS SULFATE TAB 325 MG (65 MG ELEMENTAL FE) 325 MG: 325 (65 FE) TAB at 08:32

## 2020-07-28 RX ADMIN — ACETAMINOPHEN 1000 MG: 500 TABLET ORAL at 05:33

## 2020-07-28 RX ADMIN — ASPIRIN 325 MG: 325 TABLET, DELAYED RELEASE ORAL at 08:31

## 2020-07-28 NOTE — PROGRESS NOTES
Discharge order noted for today. Pt has been accepted to [facility] agency. Met with patient and she is agreeable to the transition plan today. Transport has been arranged through her son. Patient's discharge summary and home health  orders have been forwarded to [address] [place] [address] health  agency via [facility]. Updated bedside RN, Wade Fontanez,  to the transition plan.   Discharge information has been documented on the AVS.       Myke Foster RN - Outcomes Manager  [phone]

## 2020-07-28 NOTE — PROGRESS NOTES
Bedside and Verbal shift change report given to [address] [place] [address] by Martha Chairez. Report included information from the following: SBAR, Kardex, and MAR.

## 2020-07-28 NOTE — PROGRESS NOTES
Pt seen for PT treatment at 918. Pt safe with all mobility including ambulation of 200 ft and negotiation of 12 stairs (4 stairs x3 reps). From a PT perspective, pt is safe for discharge and would benefit from home health  PT upon discharge, when appropriate. Full note to follow. Shelly Rowland Cassette

## 2020-07-28 NOTE — PROGRESS NOTES
Reason for Admission:  Osteoarthritis of left knee, unspecified osteoarthritis type [M17.12]  Arthritis of knee [M17.10]                 RUR Score:    6%            Plan for utilizing home health:    Yes, Freedom of Choice signed for [facility]                      Likelihood of Readmission:   LOW                         Transition of Care Plan:              Initial assessment completed with patient. Cognitive status of patient: oriented to time, place, person and situation. Face sheet information confirmed:  yes. The patient designates sonBritta to participate in her discharge plan and to receive any needed information. This patient lives in a single family home alone but her son will be staying with her for a few days. .  Patient is not able to navigate steps as needed. Prior to hospitalization, patient was considered to be independent with ADLs/IADLS : yes . Patient has a current ACP document on file: no  The son will be available to transport patient home upon discharge. The patient already has [facility], and [facility] available in the home. Patient is not currently active with home health. Patient has not stayed in a skilled nursing facility or rehab. This patient is on dialysis :no    List of available Home Health agencies were provided and reviewed with the patient prior to discharge. Freedom of choice signed: yes, for [facility]. Currently, the discharge plan is Home with  [facility]. The patient states that she can obtain her medications from the pharmacy, and take her medications as directed. Patient's current insurance is [employer]. Care Management Interventions  PCP Verified by CM:  Yes  [facility] (CM Consult): Discharge Planning, [address]: Yes  Current Support Network: Lives Alone(son will be staying with her for 5 days)  Confirm Follow Up Transport: Family  The Patient and/or Patient Representative was Provided with a Choice of Provider and Agrees with the Discharge Plan?: Yes  Freedom of Choice List was Provided with Basic Dialogue that Supports the Patient's Individualized Plan of Care/Goals, Treatment Preferences and Shares the Quality Data Associated with the Providers?: Yes  Discharge Location  Discharge Placement: Home with infusion therapy        Durango Courser RN - Outcomes Manager  [phone]

## 2020-07-28 NOTE — HOME CARE
Spoke with patient. Verified address and telephone. Explained home care services and routines. States she has a rolling walker at home. Orders noted and have been arranged.      Dorothy Ascencio RN, BSN  [employer]

## 2020-07-28 NOTE — PROGRESS NOTES
Problem: Self Care Deficits Care Plan (Adult)  Goal: *Acute Goals and Plan of Care (Insert Text)  Outcome: Resolved/Met     OCCUPATIONAL THERAPY EVALUATION/DISCHARGE    Patient: Holly Lewis (6 y.o. female)  Date: 7/28/2020  Primary Diagnosis: Osteoarthritis of left knee, unspecified osteoarthritis type [M17.12]  Arthritis of knee [M17.10]  Procedure(s) (LRB):  LEFT TOTAL KNEE ARTHROPLASTY (Left) 1 Day Post-Op   Precautions: Fall  PLOF: Patient was independent with self-care and functional mobility PTA. ASSESSMENT AND RECOMMENDATIONS:  Patient cleared to participate in OT evaluation by RN. Upon entering the room, patient was supine in bed, alert, and agreeable to participate in OT evaluation. Patient educated on weight-bearing status, importance of ice, and safety around the house. Vitals taken by RN this session who was briefly present in room. BP 96/59, HR 86-89 bpm and O2 91% on room air. Patient educated on adaptive equipment for lower body dressing and how to don/doff socks, pants, and underwear. Patient practiced lower body dressing with use of AE given (reacher, sock aid) after demonstration. No assist needed after practice. Patient also given a long handled sponge and long handled shoe horn after demonstration. Patient is modified independent- independent with basic self-care, stand by assistance with bed mobility and Supervision with functional transfers using rolling walker. Based on the objective data described below, the patient presents with no deficits that impede pt function with ADLs, functional transfers, and functional mobility. At this time patient is safe to d/c home with family support. OT to d/c from caseload at this time. Skilled occupational therapy is not indicated at this time.   Discharge Recommendations: Home Health  Further Equipment Recommendations for Discharge: Patient has all DME      SUBJECTIVE:   Patient stated i'm worried about the stairs and \" my son and daughter in law will be staying with me\"    OBJECTIVE DATA SUMMARY:     Past Medical History:   Diagnosis Date    Diabetes ([address]      Past Surgical History:   Procedure Laterality Date    HX GYN      c section    HX KNEE ARTHROSCOPY Right     ankle, fx     HX ORTHOPAEDIC Bilateral     minicus repair      Barriers to Learning/Limitations: None  Compensate with: visual, verbal, tactile, kinesthetic cues/model    Home Situation:   Home Situation  Home Environment: Private residence  # Steps to Enter: 1  One/Two Story Residence: Two story  # of Interior Steps: 15  Interior Rails: None  Lift Chair Available: No  Living Alone: Yes  Support Systems: Family member(s)  Patient Expects to be Discharged to[de-identified] Private residence  Current DME Used/Available at Home: Terrie Long or Shower Type: Tub/Shower combination  [x]     Right hand dominant   []     Left hand dominant    Cognitive/Behavioral Status:  Neurologic State: Alert  Orientation Level: Oriented X4  Cognition: Follows commands  Safety/Judgement: Fall prevention    Skin: Intact  Edema: None noted    Vision/Perceptual:    Acuity: Within Defined Limits      Coordination: BUE  Fine Motor Skills-Upper: Left Intact; Right Intact    Gross Motor Skills-Upper: Left Intact; Right Intact    Balance:  Sitting: Intact  Standing: Impaired; With support  Standing - Static: Good  Standing - Dynamic : Fair(+)    Strength: BUE  Strength: Generally decreased, functional    Tone & Sensation: BUE  Tone: Normal  Sensation: Intact    Range of Motion: BUE  AROM: Within functional limits    Functional Mobility and Transfers for ADLs:  Bed Mobility:  Supine to Sit: Stand-by assistance  Scooting: Stand-by assistance    Transfers:  Sit to Stand: Supervision  Stand to Sit: Supervision   Toilet Transfer : Supervision    ADL Assessment:  Feeding: Independent    Oral Facial Hygiene/Grooming: Independent    Bathing: Modified independent; Adaptive equipment    Upper Body Dressing: Independent    Lower Body Dressing: Modified independent; Adaptive equipment    Toileting: Modified independent    ADL Intervention:  Upper Body Dressing Assistance  Dressing Assistance: [id number]: Independent    Lower Body Dressing Assistance  Dressing Assistance: Modified independent  Socks: Modified independent  Leg Crossed Method Used: No  Position Performed: Bending forward method;Seated in chair  Adaptive Equipment Used: Reacher;Sock aid    Cognitive Retraining  Safety/Judgement: Fall prevention    Pain:  Pain level pre-treatment: 0/10   Pain level post-treatment: 6/10 , during LBD  Pain Intervention(s): Medication (see MAR); Response to intervention: Nurse notified, See doc flow    Activity Tolerance:   Fair+    Please refer to the flowsheet for vital signs taken during this treatment. After treatment:   [x]  Patient left in no apparent distress sitting up in chair  []  Patient left in no apparent distress in bed  [x]  Call bell left within reach  [x]  Nursing notified  []  Caregiver present  []  Bed alarm activated    COMMUNICATION/EDUCATION:   [x]      Role of Occupational Therapy in the acute care setting  [x]      Home safety education was provided and the patient/caregiver indicated understanding. [x]      Patient/family have participated as able and agree with findings and recommendations. []      Patient is unable to participate in plan of care at this time. Thank you for this referral.  Kristian Roach OTR/L  Time Calculation: 45 mins      Eval Complexity: History: LOW Complexity : Brief history review ; Examination: LOW Complexity : 1-3 performance deficits relating to physical, cognitive , or psychosocial skils that result in activity limitations and / or participation restrictions ;    Decision Making:LOW Complexity : No comorbidities that affect functional and no verbal or physical assistance needed to complete eval tasks

## 2020-07-28 NOTE — PROGRESS NOTES
Went over discharge orders with patient. Discussed with the patient and all questioned fully answered. Pt to follow up with provider.

## 2020-07-28 NOTE — PROGRESS NOTES
Problem: Patient Education: Go to Patient Education Activity  Goal: Patient/Family Education  Outcome: Progressing Towards Goal     Problem: Falls - Risk of  Goal: *Absence of Falls  Description: Document Bucky Godfrey Fall Risk and appropriate interventions in the flowsheet.   Outcome: Progressing Towards Goal  Note: Fall Risk Interventions:  Mobility Interventions: PT Consult for mobility concerns, PT Consult for assist device competence, OT consult for ADLs, Patient to call before getting OOB, Utilize walker, cane, or other assistive device    Medication Interventions: Teach patient to arise slowly, Patient to call before getting OOB    Problem: Patient Education: Go to Patient Education Activity  Goal: Patient/Family Education  Outcome: Progressing Towards Goal

## 2020-07-28 NOTE — PROGRESS NOTES
Problem: Mobility Impaired (Adult and Pediatric)  Goal: *Acute Goals and Plan of Care (Insert Text)  Description: Physical Therapy Goals  Initiated 7/27/2020 and to be accomplished within 7 day(s)  1. Patient will move from supine to sit and sit to supine , scoot up and down, and roll side to side in bed with modified independence. 2.  Patient will transfer from bed to chair and chair to bed with supervision/set-up using the least restrictive device. 3.  Patient will perform sit to stand with supervision/set-up. 4.  Patient will ambulate with supervision/set-up for 100 feet with the least restrictive device. 5.  Patient will ascend/descend 13 stairs with 1 handrail(s) with supervision/set-up. PLOF: Pt lives in a two story home with 1 DEIVN, lives on second floor, living alone but son is in town to stay with her upon discharge, has a walker, still working. Outcome: Progressing Towards Goal     PHYSICAL THERAPY TREATMENT    Patient: Mireya You (89 y.o. female)  Date: 7/28/2020  Diagnosis: Osteoarthritis of left knee, unspecified osteoarthritis type [M17.12]  Arthritis of knee [M17.10]   <principal problem not specified>  Procedure(s) (LRB):  LEFT TOTAL KNEE ARTHROPLASTY (Left) 1 Day Post-Op  Precautions: Fall  PLOF: see above    ASSESSMENT:  Pt cleared for PT treatment session per nursing. Pt received sitting up in chair, +IV, and agreeable to treatment session. Pt required Supv for sit to stand transfers from chair, demos good balance upon standing. Functional gains achieved as pt increased ambulation distance to 200 ft with RW and SBA. Instructed in stair negotiation to ascend/descend 12 stairs (4 stairs x 3 reps), initially with B handrails and progressing to use of 1 handrail. Pt demos understanding of proper sequencing for stairs with respect to L LE.  Pt returned to sitting and further educated on placing towel roll under the ankle, nothing under the knee, ankle pumps, QS, and heel slides 11K/CL, and ice application no greater than 20 min or until numbness is reached; verbalized understanding. L knee AROM after mobility 5-58*. Pt left sitting up in recliner with feet elevated, towel roll under ankle, ice applied, and all needs met/within reach. Progression toward goals:   [x]      Improving appropriately and progressing toward goals  []      Improving slowly and progressing toward goals  []      Not making progress toward goals and plan of care will be adjusted     PLAN:  Patient continues to benefit from skilled intervention to address the above impairments. Continue treatment per established plan of care. Discharge Recommendations:  Home Health  Further Equipment Recommendations for Discharge:  rolling walker     SUBJECTIVE:   Patient stated Santiago Douglass only have one handrail on my stairs at home, so I definitely need to practice.     OBJECTIVE DATA SUMMARY:   Critical Behavior:  Neurologic State: Alert  Orientation Level: Oriented X4  Cognition: Follows commands  Safety/Judgement: Fall prevention  Functional Mobility Training:  Transfers:  Sit to Stand: Supervision  Stand to Sit: Supervision  Balance:  Sitting: Intact  Standing: Impaired; With support  Standing - Static: Good  Standing - Dynamic : Fair(+)   Range Of Motion:   AROM: Within functional limits  Ambulation/Gait Training:  Distance (ft): 200 Feet (ft)  Assistive Device: Walker, rolling  Ambulation - Level of Assistance: Stand-by assistance  Gait Abnormalities: Decreased step clearance  Speed/Amy: Pace decreased (<100 feet/min)  Stairs:  Number of Stairs Trained: 12(4 stairs x3 reps)  Stairs - Level of Assistance: Stand-by assistance  Rail Use: Right     Therapeutic Exercises:   Reviewed towel roll under the ankle, nothing under the knee, ankle pumps, QS, and heel slides 54D/JH, ice application no greater than 20 min or until numbness is reached.      Pain:  Pain level pre-treatment: 0/10  Pain level post-treatment: 0/10     Activity Tolerance:   Good  Please refer to the flowsheet for vital signs taken during this treatment. After treatment:   [x] Patient left in no apparent distress sitting up in chair  [] Patient left in no apparent distress in bed  [x] Call bell left within reach  [x] Nursing notified  [] Caregiver present  [] Bed alarm activated  [] SCDs applied      COMMUNICATION/EDUCATION:   [x]         Role of Physical Therapy in the acute care setting. [x]         Fall prevention education was provided and the patient/caregiver indicated understanding. [x]         Patient/family have participated as able in working toward goals and plan of care. []         Patient/family agree to work toward stated goals and plan of care. []         Patient understands intent and goals of therapy, but is neutral about his/her participation. []         Patient is unable to participate in stated goals/plan of care: ongoing with therapy staff.  []         Other:        Clotilde Ma PTA   Time Calculation: 47 mins.

## 2020-07-28 NOTE — PROGRESS NOTES
Ortho    Pt. Seen and evaluated. Doing well, pain well controlled, progressed well with PT  Denies cp, sob, abd pain    Blood pressure 96/59, pulse 89, temperature 98.5 °F (36.9 °C), resp. rate 18, height 5' 7\" (1.702 m), weight 200 lb 6.4 oz (90.9 kg), SpO2 90 %, not currently breastfeeding. leftKnee woundclean, dry, no drainage  Sensory intact to LT  Motor intact  nv intact  Neg calf tenderness    Labs:  CBC  @  CBC:   Lab Results   Component Value Date/Time    WBC 6.0 07/13/2020 09:01 AM    RBC 4.81 07/13/2020 09:01 AM    HGB 15.2 07/13/2020 09:01 AM    HCT 44.4 07/13/2020 09:01 AM    PLATELET 485 89/47/1772 09:01 AM     BMP:   Lab Results   Component Value Date/Time    Glucose 82 07/13/2020 09:01 AM    Sodium 141 07/13/2020 09:01 AM    Potassium 4.3 07/13/2020 09:01 AM    Chloride 105 07/13/2020 09:01 AM    CO2 31 07/13/2020 09:01 AM    BUN 21 (H) 07/13/2020 09:01 AM    Creatinine 0.63 07/13/2020 09:01 AM    Calcium 9.2 07/13/2020 09:01 AM   @  Coagulation  Lab Results   Component Value Date    INR 1.0 07/13/2020    APTT 26.2 07/13/2020      Basic Metabolic Profile  Lab Results   Component Value Date     07/13/2020    CO2 31 07/13/2020    BUN 21 (H) 07/13/2020       Assesment: leftOrthopedic / Rheumatologic: Total Knee Replacement  Past Medical History:   Diagnosis Date    Diabetes ([facility] [address]      ASA: 2    Status post joint replacement pt with risk of bleeding, blood clots, and infection. Plan: aspirin, PT, DC to home if cleared by PT and ok with medicine.

## 2020-07-29 ENCOUNTER — HOME CARE VISIT (OUTPATIENT)
Dept: SCHEDULING | Facility: HOME HEALTH | Age: 60
End: 2020-07-29
Payer: OTHER GOVERNMENT

## 2020-07-29 VITALS
DIASTOLIC BLOOD PRESSURE: 78 MMHG | HEART RATE: 103 BPM | SYSTOLIC BLOOD PRESSURE: 124 MMHG | TEMPERATURE: 99 F | OXYGEN SATURATION: 96 % | RESPIRATION RATE: 18 BRPM

## 2020-07-29 VITALS
HEART RATE: 94 BPM | DIASTOLIC BLOOD PRESSURE: 48 MMHG | SYSTOLIC BLOOD PRESSURE: 119 MMHG | OXYGEN SATURATION: 96 % | TEMPERATURE: 98.8 F

## 2020-07-29 PROCEDURE — 3331090002 HH PPS REVENUE DEBIT

## 2020-07-29 PROCEDURE — G0299 HHS/HOSPICE OF RN EA 15 MIN: HCPCS

## 2020-07-29 PROCEDURE — G0151 HHCP-SERV OF PT,EA 15 MIN: HCPCS

## 2020-07-29 PROCEDURE — 400013 HH SOC

## 2020-07-29 PROCEDURE — 3331090001 HH PPS REVENUE CREDIT

## 2020-07-30 ENCOUNTER — HOME CARE VISIT (OUTPATIENT)
Dept: HOME HEALTH SERVICES | Facility: HOME HEALTH | Age: 60
End: 2020-07-30
Payer: OTHER GOVERNMENT

## 2020-07-30 VITALS
OXYGEN SATURATION: 95 % | SYSTOLIC BLOOD PRESSURE: 120 MMHG | HEART RATE: 100 BPM | TEMPERATURE: 98.4 F | DIASTOLIC BLOOD PRESSURE: 78 MMHG | RESPIRATION RATE: 16 BRPM

## 2020-07-30 PROCEDURE — 3331090002 HH PPS REVENUE DEBIT

## 2020-07-30 PROCEDURE — 3331090001 HH PPS REVENUE CREDIT

## 2020-07-30 PROCEDURE — A6213 FOAM DRG >16<=48 SQ IN W/BDR: HCPCS

## 2020-07-30 PROCEDURE — G0157 HHC PT ASSISTANT EA 15: HCPCS

## 2020-07-31 ENCOUNTER — HOME CARE VISIT (OUTPATIENT)
Dept: SCHEDULING | Facility: HOME HEALTH | Age: 60
End: 2020-07-31
Payer: OTHER GOVERNMENT

## 2020-07-31 VITALS
RESPIRATION RATE: 16 BRPM | OXYGEN SATURATION: 97 % | SYSTOLIC BLOOD PRESSURE: 130 MMHG | TEMPERATURE: 97.9 F | DIASTOLIC BLOOD PRESSURE: 80 MMHG | HEART RATE: 94 BPM

## 2020-07-31 PROCEDURE — G0157 HHC PT ASSISTANT EA 15: HCPCS

## 2020-07-31 PROCEDURE — 3331090002 HH PPS REVENUE DEBIT

## 2020-07-31 PROCEDURE — 3331090001 HH PPS REVENUE CREDIT

## 2020-08-01 ENCOUNTER — HOME CARE VISIT (OUTPATIENT)
Dept: SCHEDULING | Facility: HOME HEALTH | Age: 60
End: 2020-08-01
Payer: OTHER GOVERNMENT

## 2020-08-01 VITALS
RESPIRATION RATE: 13 BRPM | DIASTOLIC BLOOD PRESSURE: 72 MMHG | TEMPERATURE: 98.1 F | HEART RATE: 103 BPM | SYSTOLIC BLOOD PRESSURE: 124 MMHG | OXYGEN SATURATION: 94 %

## 2020-08-01 PROCEDURE — 3331090001 HH PPS REVENUE CREDIT

## 2020-08-01 PROCEDURE — 3331090002 HH PPS REVENUE DEBIT

## 2020-08-01 PROCEDURE — G0157 HHC PT ASSISTANT EA 15: HCPCS

## 2020-08-02 ENCOUNTER — HOME CARE VISIT (OUTPATIENT)
Dept: SCHEDULING | Facility: HOME HEALTH | Age: 60
End: 2020-08-02
Payer: OTHER GOVERNMENT

## 2020-08-02 PROCEDURE — G0157 HHC PT ASSISTANT EA 15: HCPCS

## 2020-08-02 PROCEDURE — 3331090001 HH PPS REVENUE CREDIT

## 2020-08-02 PROCEDURE — 3331090002 HH PPS REVENUE DEBIT

## 2020-08-03 ENCOUNTER — HOME CARE VISIT (OUTPATIENT)
Dept: SCHEDULING | Facility: HOME HEALTH | Age: 60
End: 2020-08-03
Payer: OTHER GOVERNMENT

## 2020-08-03 VITALS
SYSTOLIC BLOOD PRESSURE: 116 MMHG | OXYGEN SATURATION: 97 % | HEART RATE: 94 BPM | DIASTOLIC BLOOD PRESSURE: 72 MMHG | RESPIRATION RATE: 13 BRPM | TEMPERATURE: 97.9 F

## 2020-08-03 VITALS
HEART RATE: 100 BPM | SYSTOLIC BLOOD PRESSURE: 120 MMHG | OXYGEN SATURATION: 97 % | TEMPERATURE: 98.1 F | RESPIRATION RATE: 18 BRPM | DIASTOLIC BLOOD PRESSURE: 85 MMHG

## 2020-08-03 PROCEDURE — 3331090002 HH PPS REVENUE DEBIT

## 2020-08-03 PROCEDURE — 3331090001 HH PPS REVENUE CREDIT

## 2020-08-03 PROCEDURE — G0157 HHC PT ASSISTANT EA 15: HCPCS

## 2020-08-04 ENCOUNTER — HOME CARE VISIT (OUTPATIENT)
Dept: SCHEDULING | Facility: HOME HEALTH | Age: 60
End: 2020-08-04
Payer: OTHER GOVERNMENT

## 2020-08-04 VITALS
DIASTOLIC BLOOD PRESSURE: 80 MMHG | HEART RATE: 98 BPM | SYSTOLIC BLOOD PRESSURE: 130 MMHG | RESPIRATION RATE: 16 BRPM | TEMPERATURE: 99 F | OXYGEN SATURATION: 96 %

## 2020-08-04 PROCEDURE — G0157 HHC PT ASSISTANT EA 15: HCPCS

## 2020-08-04 PROCEDURE — 3331090001 HH PPS REVENUE CREDIT

## 2020-08-04 PROCEDURE — 3331090002 HH PPS REVENUE DEBIT

## 2020-08-05 ENCOUNTER — HOME CARE VISIT (OUTPATIENT)
Dept: SCHEDULING | Facility: HOME HEALTH | Age: 60
End: 2020-08-05
Payer: OTHER GOVERNMENT

## 2020-08-05 VITALS
RESPIRATION RATE: 16 BRPM | OXYGEN SATURATION: 96 % | TEMPERATURE: 97.5 F | DIASTOLIC BLOOD PRESSURE: 88 MMHG | HEART RATE: 95 BPM | SYSTOLIC BLOOD PRESSURE: 124 MMHG

## 2020-08-05 PROCEDURE — G0300 HHS/HOSPICE OF LPN EA 15 MIN: HCPCS

## 2020-08-05 PROCEDURE — 3331090002 HH PPS REVENUE DEBIT

## 2020-08-05 PROCEDURE — G0157 HHC PT ASSISTANT EA 15: HCPCS

## 2020-08-05 PROCEDURE — 3331090001 HH PPS REVENUE CREDIT

## 2020-08-06 ENCOUNTER — HOME CARE VISIT (OUTPATIENT)
Dept: SCHEDULING | Facility: HOME HEALTH | Age: 60
End: 2020-08-06
Payer: OTHER GOVERNMENT

## 2020-08-06 VITALS
TEMPERATURE: 97.7 F | SYSTOLIC BLOOD PRESSURE: 110 MMHG | DIASTOLIC BLOOD PRESSURE: 72 MMHG | HEART RATE: 90 BPM | RESPIRATION RATE: 18 BRPM | OXYGEN SATURATION: 97 %

## 2020-08-06 VITALS
DIASTOLIC BLOOD PRESSURE: 90 MMHG | RESPIRATION RATE: 18 BRPM | SYSTOLIC BLOOD PRESSURE: 145 MMHG | OXYGEN SATURATION: 98 % | HEART RATE: 90 BPM | TEMPERATURE: 98.3 F

## 2020-08-06 PROCEDURE — 3331090001 HH PPS REVENUE CREDIT

## 2020-08-06 PROCEDURE — 3331090002 HH PPS REVENUE DEBIT

## 2020-08-06 PROCEDURE — G0157 HHC PT ASSISTANT EA 15: HCPCS

## 2020-08-07 ENCOUNTER — HOME CARE VISIT (OUTPATIENT)
Dept: SCHEDULING | Facility: HOME HEALTH | Age: 60
End: 2020-08-07
Payer: OTHER GOVERNMENT

## 2020-08-07 VITALS
HEART RATE: 98 BPM | TEMPERATURE: 97.7 F | OXYGEN SATURATION: 97 % | DIASTOLIC BLOOD PRESSURE: 85 MMHG | RESPIRATION RATE: 16 BRPM | SYSTOLIC BLOOD PRESSURE: 130 MMHG

## 2020-08-07 PROCEDURE — G0151 HHCP-SERV OF PT,EA 15 MIN: HCPCS

## 2020-08-07 PROCEDURE — 3331090001 HH PPS REVENUE CREDIT

## 2020-08-07 PROCEDURE — 3331090002 HH PPS REVENUE DEBIT

## 2020-08-08 ENCOUNTER — HOME CARE VISIT (OUTPATIENT)
Dept: SCHEDULING | Facility: HOME HEALTH | Age: 60
End: 2020-08-08
Payer: OTHER GOVERNMENT

## 2020-08-08 PROCEDURE — 3331090002 HH PPS REVENUE DEBIT

## 2020-08-08 PROCEDURE — 3331090001 HH PPS REVENUE CREDIT

## 2020-08-08 PROCEDURE — G0157 HHC PT ASSISTANT EA 15: HCPCS

## 2020-08-09 ENCOUNTER — HOME CARE VISIT (OUTPATIENT)
Dept: SCHEDULING | Facility: HOME HEALTH | Age: 60
End: 2020-08-09
Payer: OTHER GOVERNMENT

## 2020-08-09 VITALS
SYSTOLIC BLOOD PRESSURE: 130 MMHG | HEART RATE: 95 BPM | OXYGEN SATURATION: 98 % | DIASTOLIC BLOOD PRESSURE: 76 MMHG | TEMPERATURE: 97.9 F | RESPIRATION RATE: 13 BRPM

## 2020-08-09 PROCEDURE — 3331090001 HH PPS REVENUE CREDIT

## 2020-08-09 PROCEDURE — G0157 HHC PT ASSISTANT EA 15: HCPCS

## 2020-08-09 PROCEDURE — 3331090002 HH PPS REVENUE DEBIT

## 2020-08-10 ENCOUNTER — HOME CARE VISIT (OUTPATIENT)
Dept: SCHEDULING | Facility: HOME HEALTH | Age: 60
End: 2020-08-10
Payer: OTHER GOVERNMENT

## 2020-08-10 VITALS
DIASTOLIC BLOOD PRESSURE: 85 MMHG | OXYGEN SATURATION: 98 % | SYSTOLIC BLOOD PRESSURE: 130 MMHG | HEART RATE: 93 BPM | RESPIRATION RATE: 13 BRPM | TEMPERATURE: 98.3 F

## 2020-08-10 VITALS
HEART RATE: 84 BPM | TEMPERATURE: 98.3 F | OXYGEN SATURATION: 97 % | RESPIRATION RATE: 16 BRPM | DIASTOLIC BLOOD PRESSURE: 80 MMHG | SYSTOLIC BLOOD PRESSURE: 110 MMHG

## 2020-08-10 PROCEDURE — 3331090002 HH PPS REVENUE DEBIT

## 2020-08-10 PROCEDURE — 3331090001 HH PPS REVENUE CREDIT

## 2020-08-10 PROCEDURE — G0157 HHC PT ASSISTANT EA 15: HCPCS

## 2020-08-11 ENCOUNTER — HOME CARE VISIT (OUTPATIENT)
Dept: SCHEDULING | Facility: HOME HEALTH | Age: 60
End: 2020-08-11
Payer: OTHER GOVERNMENT

## 2020-08-11 VITALS
RESPIRATION RATE: 16 BRPM | SYSTOLIC BLOOD PRESSURE: 120 MMHG | OXYGEN SATURATION: 98 % | DIASTOLIC BLOOD PRESSURE: 70 MMHG | TEMPERATURE: 97.5 F | HEART RATE: 96 BPM

## 2020-08-11 PROCEDURE — 3331090002 HH PPS REVENUE DEBIT

## 2020-08-11 PROCEDURE — G0151 HHCP-SERV OF PT,EA 15 MIN: HCPCS

## 2020-08-11 PROCEDURE — G0300 HHS/HOSPICE OF LPN EA 15 MIN: HCPCS

## 2020-08-11 PROCEDURE — 3331090001 HH PPS REVENUE CREDIT

## 2020-08-13 ENCOUNTER — OFFICE VISIT (OUTPATIENT)
Dept: ORTHOPEDIC SURGERY | Facility: CLINIC | Age: 60
End: 2020-08-13

## 2020-08-13 VITALS
OXYGEN SATURATION: 96 % | HEART RATE: 98 BPM | TEMPERATURE: 97.4 F | BODY MASS INDEX: 31.39 KG/M2 | HEIGHT: 67 IN | SYSTOLIC BLOOD PRESSURE: 108 MMHG | RESPIRATION RATE: 16 BRPM | DIASTOLIC BLOOD PRESSURE: 69 MMHG | WEIGHT: 200 LBS

## 2020-08-13 DIAGNOSIS — M25.562 LEFT KNEE PAIN, UNSPECIFIED CHRONICITY: ICD-10-CM

## 2020-08-13 DIAGNOSIS — Z96.652 STATUS POST LEFT KNEE REPLACEMENT: Primary | ICD-10-CM

## 2020-08-13 RX ORDER — OXYCODONE AND ACETAMINOPHEN 7.5; 325 MG/1; MG/1
1-2 TABLET ORAL
Qty: 42 TAB | Refills: 0 | Status: SHIPPED | OUTPATIENT
Start: 2020-08-13 | End: 2020-08-20

## 2020-08-13 NOTE — PROGRESS NOTES
[address] [place] [address]  [phone]           Patient: Alex Childress                MRN: [id number]       SSN: [id number]  YOB: 1960        AGE: 63 y.o. SEX: female  Body mass index is 31.32 kg/m². PCP: Mavis Perera MD  08/13/20      This office note has been dictated. REVIEW OF SYSTEMS:  Constitutional: Negative for fever, chills, weight loss and malaise/fatigue. HENT: Negative. Eyes: Negative. Respiratory: Negative. Cardiovascular: Negative. Gastrointestinal: No bowel incontinence or constipation. Genitourinary: No bladder incontinence or saddle anesthesia. Skin: Negative. Neurological: Negative. Endo/Heme/Allergies: Negative. Psychiatric/Behavioral: Negative. Musculoskeletal: As per HPI above. Past Medical History:   Diagnosis Date    Diabetes [facility])          Current Outpatient Medications:     rosuvastatin (CRESTOR) 10 mg tablet, Take 10 mg by mouth nightly., Disp: , Rfl:     multivit-min/ferrous fumarate (MULTI VITAMIN PO), Take 1 Tab by mouth daily. , Disp: , Rfl:     aspirin (ASPIRIN) 325 mg tablet, Take 1 Tab by mouth two (2) times a day., Disp: 60 Tab, Rfl: 0    docusate sodium (Colace) 100 mg capsule, Take 1 Cap by mouth two (2) times a day for 90 days. , Disp: 60 Cap, Rfl: 2    celecoxib (CeleBREX) 200 mg capsule, Take 1 Cap by mouth two (2) times a day for 90 days. , Disp: 60 Cap, Rfl: 2    cephALEXin (Keflex) 500 mg capsule, Take 1 Cap by mouth four (4) times daily. , Disp: 8 Cap, Rfl: 0    empagliflozin (JARDIANCE PO), Take 25 mg by mouth daily. , Disp: , Rfl:     metFORMIN (GLUCOPHAGE) 1,000 mg tablet, Take 1,000 mg by mouth two (2) times daily (with meals). , Disp: , Rfl:     dulaglutide (TRULICITY) 1.5 EG/1.3 mL sub-q pen, 1.5 mg by SubCUTAneous route every seven (7) days. , Disp: , Rfl:     ergocalciferol (VITAMIN D2) 50,000 unit capsule, Take 50,000 Units by mouth., Disp: , Rfl:     No Known Allergies    Social History     Socioeconomic History    Marital status: UNKNOWN     Spouse name: Not on file    Number of children: Not on file    Years of education: Not on file    Highest education level: Not on file   Occupational History    Not on file   Social Needs    Financial resource strain: Not on file    Food insecurity     Worry: Not on file     Inability: Not on file    Transportation needs     Medical: Not on file     Non-medical: Not on file   Tobacco Use    Smoking status: Former Smoker    Smokeless tobacco: Never Used   Substance and Sexual Activity    Alcohol use: Not Currently    Drug use: Never    Sexual activity: Not on file   Lifestyle    Physical activity     Days per week: Not on file     Minutes per session: Not on file    Stress: Not on file   Relationships    Social connections     Talks on phone: Not on file     Gets together: Not on file     Attends Christian service: Not on file     Active member of club or organization: Not on file     Attends meetings of clubs or organizations: Not on file     Relationship status: Not on file    Intimate partner violence     Fear of current or ex partner: Not on file     Emotionally abused: Not on file     Physically abused: Not on file     Forced sexual activity: Not on file   Other Topics Concern    Not on file   Social History Narrative    Not on file       Past Surgical History:   Procedure Laterality Date    HX GYN      c section    HX KNEE ARTHROSCOPY Right     ankle, fx     HX ORTHOPAEDIC Bilateral     minicus repair            Patient seen and evaluated today for her left knee. She is now 17 days status post left total knee replacement surgery. She is overall doing well. Did have an episode where her leg fell off a stool when she was doing when she wipers and caused some increased discomfort. It did settle down however. She is receiving home health physical therapy. complications. No trouble with the wound. Patient denies recent fevers, chills, chest pain, SOB, or injuries. No recent systemic changes noted. A 12-point review of systems is performed today. Pertinent positives are noted. All other systems reviewed and otherwise are negative. Physical exam: General: Alert and oriented x3, nad.  well-developed, well nourished. normal affect, AF. NC/AT, EOMI, neck supple, trachea midline, no JVD present. Breathing is non-labored. Examination left knee reveals skin intact. There is no erythema, ecchymosis, warmth. There are no signs for infection or sores present. Range of motion is full extension to about 90 degrees of flexion. She can hold a straight leg raise without defects noted extensor mechanism. Negative calf tenderness. Negative Homans. No signs of DVT present. Stability of the knee is excellent. Assessment: Status post left knee replacement    Plan: At this point, the staples were removed and replaced with Steri-Strips without complications. She will be set up with outpatient physical therapy. She will continue with home excise program and range of motion activities on an hourly basis. She will see us back in 2 weeks time for evaluation and range of motion check. A refill of pain medicine will be sent to the pharmacy.             JR Mc SMITH PA-C, ATC

## 2020-08-17 VITALS
HEART RATE: 93 BPM | OXYGEN SATURATION: 98 % | RESPIRATION RATE: 18 BRPM | DIASTOLIC BLOOD PRESSURE: 74 MMHG | TEMPERATURE: 98.3 F | SYSTOLIC BLOOD PRESSURE: 118 MMHG

## 2020-08-20 ENCOUNTER — HOSPITAL ENCOUNTER (OUTPATIENT)
Dept: PHYSICAL THERAPY | Age: 60
Discharge: HOME | End: 2020-08-20
Payer: OTHER GOVERNMENT

## 2020-08-20 PROCEDURE — 97110 THERAPEUTIC EXERCISES: CPT

## 2020-08-20 PROCEDURE — 97535 SELF CARE MNGMENT TRAINING: CPT

## 2020-08-20 PROCEDURE — 97162 PT EVAL MOD COMPLEX 30 MIN: CPT

## 2020-08-20 NOTE — PROGRESS NOTES
[address] PHYSICAL THERAPY  [address] [place] [address] [address],Shahrzad Tamez, [address] [facility] - Phone: [phone]  Fax: [phone] / [address] [facility]  Patient Name: Adriana Milligan : 1960   Medical   Diagnosis: S/p left knee TKA Treatment Diagnosis: Pain in left knee [M25.562]   Onset Date: 20     Referral Source:  Dr. Lonny Menjivar [facility] Start of Care [facility]): 2020   Prior Hospitalization: See medical history Provider #: [id number]   Prior Level of Function: Years of bilat knee pain with difficulty with amb/standing/stairs   Comorbidities: DM, arthritis, visual impaired   Medications: Verified on Patient Summary List   The Plan of Care and following information is based on the information from the initial evaluation.   ===========================================================================================  Assessment / jaeger information:  Adriana Milligan is a 63 y.o.  yo female with Dx: left knee pain, s/p left TKA DOS: 20, who reports years of bilat knee pain prior to surgery, since having pain mainly at ant thigh and proximal gastroc. Pt rates pain as 10/10 max, 5/10 at best, 5/10 today located and ant thigh and proximal gastroc, increasing with walking/movement. Pt did have HHPT x 2 week after surgery and still doing HEP given. Objective: FOTO score = 35 (an established functional score where 100 = no disability). Pt amb into clinic without cane stating she forgot it. Amb is mildly antalgic and stiff legged on left. AAROM left knee 3 - 84. Painful fair quad set, difficulty with supine SLR be able to perform, improved in sitting. Pt very tender with positive jump sign to palpation at distal quad, peripatellar, proximal gastroc on left. No sig pain with ankle DF/gastroc stretch. SL balaance requiring UE support bilat, incrased sway left compared to right.   Visual effusion present on left knee with steristrips intact. Pt instructed in HEP and will f/u in clinic for PT.  ===========================================================================================  Eval Complexity: History HIGH Complexity :3+ comorbidities / personal factors will impact the outcome/ POC ;  Examination  HIGH Complexity : 4+ Standardized tests and measures addressing body structure, function, activity limitation and / or participation in recreation ; Presentation MEDIUM Complexity : Evolving with changing characteristics ; Decision Making MEDIUM Complexity : FOTO score of 26-74; Overall Complexity MEDIUM  Problem List: pain affecting function, decrease ROM, decrease strength, edema affecting function, impaired gait/ balance, decrease ADL/ functional abilitiies, decrease activity tolerance and decrease flexibility/ joint mobility FOTO = 35  Treatment Plan may include any combination of the following: Therapeutic exercise, Therapeutic activities, Neuromuscular re-education, Physical agent/modality, Gait/balance training, Manual therapy, Patient education and Self Care training  Patient / Family readiness to learn indicated by: asking questions, trying to perform skills and interest  Persons(s) to be included in education: patient (P)  Barriers to Learning/Limitations: no  Measures taken, if barriers to learning: N/A   Patient Goal (s): \"walk up and down stairs, full use of left knee\"   Patient self reported health status: good  Rehabilitation Potential: good   Short Term Goals: To be accomplished in  1-2  weeks:  1. Independent with HEP. 2. Decrease max pain 25-50% to assist with ADLs, self care. 3. AAROM left knee 0 - 95 deg to assist with dressing and self care.  Long Term Goals: To be accomplished in  4-6  weeks:  1. Improve left knee AAROM 0 - 115 to assist with ADLs, dressing, self care. 2.  Improve FOTO Functional Status Score by 22 points in order to show significant functional improvement.   3.  Pt will be able to amb without deviation 150 ft community distances for safety in community. Frequency / Duration:   Patient to be seen  2-3  times per week for 4-6  weeks:  Patient / Caregiver education and instruction: self care and exercises  Therapist Signature: José Miguel Doran PT, DPT, OCS, CSCS Date: 5/97/4750   Certification Period: NA Time: 3:03 PM   ===========================================================================================  I certify that the above Physical Therapy Services are being furnished while the patient is under my care. I agree with the treatment plan and certify that this therapy is necessary. Physician Signature:        Date:       Time:     Please sign and return to [facility] [place] or you may fax the signed copy to [phone]. Thank you.

## 2020-08-26 ENCOUNTER — HOSPITAL ENCOUNTER (OUTPATIENT)
Dept: PHYSICAL THERAPY | Age: 60
Discharge: HOME | End: 2020-08-26
Payer: OTHER GOVERNMENT

## 2020-08-26 PROCEDURE — 97112 NEUROMUSCULAR REEDUCATION: CPT

## 2020-08-26 PROCEDURE — 97140 MANUAL THERAPY 1/> REGIONS: CPT

## 2020-08-26 PROCEDURE — 97110 THERAPEUTIC EXERCISES: CPT

## 2020-08-26 NOTE — PROGRESS NOTES
PHYSICAL THERAPY - DAILY TREATMENT NOTE    Patient Name: Angel Loge        Date: 2020  : 1960   yes Patient  Verified  Visit #:   2   of   18  Insurance: Payor:  / Plan: Phillip Farias 74 / Product Type:  /      In time: 12:07 Out time: 12:54   Total Treatment Time: 47     Medicare/[facility] Time Tracking (below)   Total Timed Codes (min):  na 1:1 Treatment Time:  na     TREATMENT AREA =  Pain in left knee [M25.562]    SUBJECTIVE  Pain Level (on 0 to 10 scale):  6-7  / 10   Medication Changes/New allergies or changes in medical history, any new surgeries or procedures?    no  If yes, update Summary List   Subjective Functional Status/Changes:  []  No changes reported     indep with HEP, hypersensitivty with ice pack at ant knee;  Still unable to walk up/down stairs w/o UE         OBJECTIVE    29 min Therapeutic Exercise:  [x]  See flow sheet   Rationale:      increase ROM and increase strength to improve the patients ability to amb/stnad prolonged     10 min Manual Therapy: Polly Bunn mob, MARCIO knee flex/ext, HS stretch, STM   Rationale:      decrease pain, increase ROM and increase tissue extensibility to improve patient's ability to amb/stand prolonged    8 min Neuromuscular Re-ed: [x]  See flow sheet   Rationale:    improve coordination, improve balance and increase proprioception to improve the patients ability to amb/balance in community    Billed With/As:   [x] TE   [] TA   [] Neuro   [] Self Care Patient Education: [x] Review HEP    [] Progressed/Changed HEP based on:   [] positioning   [] body mechanics   [] transfers   [] heat/ice application    [] other:      Other Objective/Functional Measures:    initiated therapeutic exercises  Able to SL bal approx 10 sec without UE support on left  Noted tender at ant med left knee with STM  AAROM = 0 - 89  Several episode of jump sign and gasps during exs mostly with movt into knee flexion     Post Treatment Pain Level (on 0 to 10) scale:   0  / 10     ASSESSMENT  Assessment/Changes in Function:     Good koko to initial Rx; slow at start of exs but catches on quickly and improves each set. []  See Progress Note/Recertification   Patient will continue to benefit from skilled PT services to modify and progress therapeutic interventions, address functional mobility deficits, address ROM deficits, address strength deficits, analyze and address soft tissue restrictions and analyze and cue movement patterns to attain remaining goals.    Progress toward goals / Updated goals:    indep with HEP  AAROM improved 0-89 today     PLAN  [x]  Upgrade activities as tolerated yes Continue plan of care   []  Discharge due to :    []  Other:      Therapist: Jerardo Carrillo PT, DPT, OCS, CSCS    Date: 8/26/2020 Time: 12:59 PM     Future Appointments   Date Time Provider Jose Lucero   8/27/2020  8:30 AM Eusebio Ronquillo PA-C [facility]   8/31/2020  8:30 AM Charles Mustard, PTA [address]   9/2/2020  9:15 AM Charles Mustard, PTA [address]   9/4/2020  9:15 AM Charles Mustard, PTA [address]   9/8/2020  9:00 AM Charles Mustard, PTA [address]   9/10/2020 10:00 AM Charles Mustard, PTA [address]   9/11/2020  9:15 AM Charles Mustard, PTA [address]   9/14/2020  9:15 AM Charles Mustard, PTA [address]   9/16/2020  9:15 AM Charles Mustard, PTA [address]   9/18/2020 10:00 AM Charles Mustard, PTA [facility]   9/21/2020  9:15 AM Charles Mustard, PTA [facility]   9/23/2020 10:00 AM Charles Mustard, PTA [facility]   9/25/2020 10:00 AM Milton Ornelas PT [facility]   9/28/2020  9:15 AM Charles Mustard, PTA [facility]   9/30/2020 10:00 AM Charles Mustard, PTA [facility]   10/2/2020 10:00 AM LAURY [address]

## 2020-08-27 ENCOUNTER — OFFICE VISIT (OUTPATIENT)
Dept: ORTHOPEDIC SURGERY | Facility: CLINIC | Age: 60
End: 2020-08-27

## 2020-08-27 VITALS
OXYGEN SATURATION: 99 % | HEART RATE: 63 BPM | SYSTOLIC BLOOD PRESSURE: 108 MMHG | TEMPERATURE: 96.8 F | DIASTOLIC BLOOD PRESSURE: 75 MMHG | WEIGHT: 199 LBS | RESPIRATION RATE: 18 BRPM | BODY MASS INDEX: 31.23 KG/M2 | HEIGHT: 67 IN

## 2020-08-27 DIAGNOSIS — Z96.652 STATUS POST LEFT KNEE REPLACEMENT: Primary | ICD-10-CM

## 2020-08-27 DIAGNOSIS — M25.562 LEFT KNEE PAIN, UNSPECIFIED CHRONICITY: ICD-10-CM

## 2020-08-27 RX ORDER — OXYCODONE AND ACETAMINOPHEN 7.5; 325 MG/1; MG/1
1-2 TABLET ORAL
Qty: 42 TAB | Refills: 0 | Status: SHIPPED | OUTPATIENT
Start: 2020-08-27 | End: 2020-09-03

## 2020-08-27 NOTE — PROGRESS NOTES
[address] [place] [address]  [phone]           Patient: Rachelle Kolb                MRN: [id number]       SSN: [id number]  YOB: 1960        AGE: 63 y.o. SEX: female  Body mass index is 31.17 kg/m². PCP: Mary Holman MD  08/27/20      This office note has been dictated. REVIEW OF SYSTEMS:  Constitutional: Negative for fever, chills, weight loss and malaise/fatigue. HENT: Negative. Eyes: Negative. Respiratory: Negative. Cardiovascular: Negative. Gastrointestinal: No bowel incontinence or constipation. Genitourinary: No bladder incontinence or saddle anesthesia. Skin: Negative. Neurological: Negative. Endo/Heme/Allergies: Negative. Psychiatric/Behavioral: Negative. Musculoskeletal: As per HPI above. Past Medical History:   Diagnosis Date    Diabetes [facility])          Current Outpatient Medications:     rosuvastatin (CRESTOR) 10 mg tablet, Take 10 mg by mouth nightly., Disp: , Rfl:     multivit-min/ferrous fumarate (MULTI VITAMIN PO), Take 1 Tab by mouth daily. , Disp: , Rfl:     docusate sodium (Colace) 100 mg capsule, Take 1 Cap by mouth two (2) times a day for 90 days. , Disp: 60 Cap, Rfl: 2    celecoxib (CeleBREX) 200 mg capsule, Take 1 Cap by mouth two (2) times a day for 90 days. , Disp: 60 Cap, Rfl: 2    empagliflozin (JARDIANCE PO), Take 25 mg by mouth daily. , Disp: , Rfl:     metFORMIN (GLUCOPHAGE) 1,000 mg tablet, Take 1,000 mg by mouth two (2) times daily (with meals). , Disp: , Rfl:     dulaglutide (TRULICITY) 1.5 BG/4.8 mL sub-q pen, 1.5 mg by SubCUTAneous route every seven (7) days. , Disp: , Rfl:     ergocalciferol (VITAMIN D2) 50,000 unit capsule, Take 50,000 Units by mouth., Disp: , Rfl:     aspirin (ASPIRIN) 325 mg tablet, Take 1 Tab by mouth two (2) times a day., Disp: 60 Tab, Rfl: 0    cephALEXin (Keflex) 500 mg capsule, Take 1 Cap by mouth four (4) times daily. , Disp: 8 Cap, Rfl: 0    No Known Allergies    Social History     Socioeconomic History    Marital status: UNKNOWN     Spouse name: Not on file    Number of children: Not on file    Years of education: Not on file    Highest education level: Not on file   Occupational History    Not on file   Social Needs    Financial resource strain: Not on file    Food insecurity     Worry: Not on file     Inability: Not on file    Transportation needs     Medical: Not on file     Non-medical: Not on file   Tobacco Use    Smoking status: Former Smoker    Smokeless tobacco: Never Used   Substance and Sexual Activity    Alcohol use: Not Currently    Drug use: Never    Sexual activity: Not on file   Lifestyle    Physical activity     Days per week: Not on file     Minutes per session: Not on file    Stress: Not on file   Relationships    Social connections     Talks on phone: Not on file     Gets together: Not on file     Attends Yarsani service: Not on file     Active member of club or organization: Not on file     Attends meetings of clubs or organizations: Not on file     Relationship status: Not on file    Intimate partner violence     Fear of current or ex partner: Not on file     Emotionally abused: Not on file     Physically abused: Not on file     Forced sexual activity: Not on file   Other Topics Concern    Not on file   Social History Narrative    Not on file       Past Surgical History:   Procedure Laterality Date    HX GYN      c section    HX KNEE ARTHROSCOPY Right     ankle, fx     HX ORTHOPAEDIC Bilateral     minicus repair              Patient seen and evaluated today for her left knee replacement. Patient now 4 weeks status post surgery. She is just begun outpatient physical therapy. Is scheduled for 3 times a week. Unfortunately she has been noncompliant working on range of motion to was on her own at home. Pain is well controlled. She does continue on Celebrex.     Patient denies recent fevers, chills, chest pain, SOB, or injuries. No recent systemic changes noted. A 12-point review of systems is performed today. Pertinent positives are noted. All other systems reviewed and otherwise are negative. Physical exam: General: Alert and oriented x3, nad.  well-developed, well nourished. normal affect, AF. NC/AT, EOMI, neck supple, trachea midline, no JVD present. Breathing is non-labored. Examination left knee reveal skin intact. The surgical wound is healing up nicely. There is no erythema, ecchymosis. There are no signs of infection. She has full extension to about 90 degrees of flexion. Patella tracks nicely without rubs or crepitus noted. Stability is quite good. Negative calf tenderness. Negative Homans. No signs of DVT present. Assessment: Status post left total knee replacement    Plan: At this point, we discussed her range of motion. She needs to work on her flexion on an hourly basis which was demonstrated for the patient today in office. We will send a refill of pain medicine to pharmacy. She will continue on Celebrex. She will continue ice therapy. Activities as tolerated. She will see us back in 2 weeks time for reevaluation, range of motion check.           JR Mc SMITH, NILESH, ATC

## 2020-08-31 ENCOUNTER — HOSPITAL ENCOUNTER (OUTPATIENT)
Dept: PHYSICAL THERAPY | Age: 60
Discharge: HOME | End: 2020-08-31
Payer: OTHER GOVERNMENT

## 2020-08-31 PROCEDURE — 97110 THERAPEUTIC EXERCISES: CPT

## 2020-08-31 PROCEDURE — 97140 MANUAL THERAPY 1/> REGIONS: CPT

## 2020-08-31 NOTE — PROGRESS NOTES
PHYSICAL THERAPY - DAILY TREATMENT NOTE    Patient Name: Flavia Guzman        Date: 2020  : 1960   yes Patient  Verified  Visit #:   3   of   18  Insurance: Payor: DEENA / Plan: Mary Ann Joe / Product Type:  /      In time: 389 Out time: 920   Total Treatment Time: 50     Medicare/[facility] Time Tracking (below)   Total Timed Codes (min):  na 1:1 Treatment Time:  na     TREATMENT AREA =  Pain in left knee [M25.562]    SUBJECTIVE  Pain Level (on 0 to 10 scale):  2  / 10   Medication Changes/New allergies or changes in medical history, any new surgeries or procedures?    no  If yes, update Summary List   Subjective Functional Status/Changes:  []  No changes reported     I felt good after last session. But I saw my doctor and he said I should be past 100 degrees of knee flexion. OBJECTIVE    35 min Therapeutic Exercise:  [x]  See flow sheet   Rationale:      increase ROM, increase strength, improve coordination and improve balance to improve the patients ability to perform functional mobility activities with decrease c/o symptoms. 15 min Manual Therapy: Pat mob, AAROM knee flex/ext, HS stretch, STM VMO with knee flexed. Rationale:      decrease pain, increase ROM and increase tissue extensibility to improve patient's ability to perform functional mobility activities with decrease c/o symptoms. Billed With/As:   [] TE   [] TA   [] Neuro   [] Self Care Patient Education: [x] Review HEP    [] Progressed/Changed HEP based on:   [] positioning   [] body mechanics   [] transfers   [] heat/ice application    [] other:      Other Objective/Functional Measures:    0-95 degrees PROM after manual.     Post Treatment Pain Level (on 0 to 10) scale:   5  / 10     ASSESSMENT  Assessment/Changes in Function:     Reviewed seated knee flexion stretch, prone knee flexion  stretch with belt and contract/relase knee flexion stretch as 3 stretches to perform at home to increase ROM. Patient returned demonstration and verbalized understanding. Patient also instructed to perform desensitization to knee. []  See Progress Note/Recertification   Patient will continue to benefit from skilled PT services to modify and progress therapeutic interventions, address functional mobility deficits, address ROM deficits, address strength deficits, analyze and address soft tissue restrictions and analyze and cue movement patterns to attain remaining goals. Progress toward goals / Updated goals: · Short Term Goals: To be accomplished in  1-2  weeks:  1. Independent with HEP. 2. Decrease max pain 25-50% to assist with ADLs, self care. 3. AAROM left knee 0 - 95 deg to assist with dressing and self care. · Long Term Goals: To be accomplished in  4-6  weeks:  1. Improve left knee AAROM 0 - 115 to assist with ADLs, dressing, self care. 2.  Improve FOTO Functional Status Score by 22 points in order to show significant functional improvement. 3.  Pt will be able to amb without deviation 150 ft community distances for safety in community.     No change toward goals today     PLAN  []  Upgrade activities as tolerated yes Continue plan of care   []  Discharge due to :    []  Other:      Therapist: Marco Miguel PTA    Date: 8/31/2020 Time: 6:17 AM     Future Appointments   Date Time Provider Jose Lucero   8/31/2020  8:30 AM Myrl Raw, PTA [address]   9/2/2020  9:15 AM Myrl Raw, PTA [address]   9/4/2020  9:15 AM Myrl Raw, PTA [address]   9/8/2020  9:00 AM Myrl Raw, PTA [address]   9/10/2020 10:00 AM Camila Guerrero PA-C [facility]   9/10/2020 10:00 AM Myrl Raw, PTA [address]   9/11/2020  9:15 AM Myrl Raw, PTA [address]   9/14/2020  9:15 AM Myrl Raw, PTA [address]   9/16/2020  9:15 AM Myrl Raw,  [address] [facility]   9/18/2020 10:00 AM Myrl Raw, PTA [address]   9/21/2020  9:15 AM Myrl Raw, PTA [address]   9/23/2020 10:00 AM uR Rhodes,  [facility]   9/25/2020 10:00 AM Sylvain Kamara,  [facility]   9/28/2020  9:15 AM Ru Rhodes, PTA [address]   9/30/2020 10:00 AM Ru Rhodes, PTA [address]   10/2/2020 10:00 AM Ru Rhodes, PTA [address]

## 2020-09-02 ENCOUNTER — HOSPITAL ENCOUNTER (OUTPATIENT)
Dept: PHYSICAL THERAPY | Age: 60
Discharge: HOME | End: 2020-09-02
Payer: OTHER GOVERNMENT

## 2020-09-02 PROCEDURE — 97012 MECHANICAL TRACTION THERAPY: CPT

## 2020-09-02 PROCEDURE — 97110 THERAPEUTIC EXERCISES: CPT

## 2020-09-02 NOTE — PROGRESS NOTES
PHYSICAL THERAPY - DAILY TREATMENT NOTE    Patient Name: Norma Dailey        Date: 2020  : 1960   yes Patient  Verified  Visit #:     Insurance: Payor: DEENA / Plan: Phillip Farias 74 / Product Type:  /      In time: 534 Out time: 0995   Total Treatment Time: 60     Medicare/[facility] Time Tracking (below)   Total Timed Codes (min):  na 1:1 Treatment Time:  na     TREATMENT AREA =  Pain in left knee [M25.562]    SUBJECTIVE  Pain Level (on 0 to 10 scale):  7-8  / 10   Medication Changes/New allergies or changes in medical history, any new surgeries or procedures?    no  If yes, update Summary List   Subjective Functional Status/Changes:  []  No changes reported     I am very stiff this morning and my knee has really been bothering me since last visit. OBJECTIVE    35 min Therapeutic Exercise:  [x]  See flow sheet   Rationale:      increase ROM, increase strength, improve coordination and improve balance to improve the patients ability to perform functional mobility activities with decrease c/o symptoms. 25 min Manual Therapy: Pat mob, AAROM knee flex/ext, HS stretch, STM VMO with knee flexed. Retrograde massage   Rationale:      decrease pain, increase ROM and increase tissue extensibility to improve patient's ability to perform functional mobility activities with decrease c/o symptoms. Billed With/As:   [x] TE   [] TA   [] Neuro   [] Self Care Patient Education: [x] Review HEP    [] Progressed/Changed HEP based on:   [] positioning   [] body mechanics   [] transfers   [] heat/ice application    [] other:      Other Objective/Functional Measures:    Knee flexion after manual: 97 degrees. Hip hike used with tap up, patient given tactile cueing to decrease use of hip hike.   Able to return demonstration with decrease hip hike after given tactile cueing     Post Treatment Pain Level (on 0 to 10) scale:   5  / 10     ASSESSMENT  Assessment/Changes in Function: Good relief of pain and stiffness after manual and therx. Patient with less antalgic gait when walking out and improved knee flexion when walking. []  See Progress Note/Recertification   Patient will continue to benefit from skilled PT services to modify and progress therapeutic interventions, address functional mobility deficits, address ROM deficits, address strength deficits, analyze and address soft tissue restrictions and analyze and cue movement patterns to attain remaining goals. Progress toward goals / Updated goals: · Short Term Goals: To be accomplished in  1-2  weeks:  1. Independent with HEP. Progressing well 9/2/2020  2. Decrease max pain 25-50% to assist with ADLs, self care. 3. AAROM left knee 0 - 95 deg to assist with dressing and self care. · Long Term Goals: To be accomplished in  4-6  weeks:  1.  Improve left knee AAROM 0 - 115 to assist with ADLs, dressing, self care. 2.  Improve FOTO Functional Status Score by 22 points in order to show significant functional improvement. 3.  Pt will be able to amb without deviation 150 ft community distances for safety in community.        PLAN  []  Upgrade activities as tolerated yes Continue plan of care   []  Discharge due to :    []  Other:      Therapist: Delfina Alba PTA    Date: 9/2/2020 Time: 6:28 AM     Future Appointments   Date Time Provider Jose Lucero   9/2/2020  9:15 AM Zo Bennett, PTA [address]   9/4/2020  9:15 AM Zo Bennett, PTA [address]   9/8/2020  9:00 AM Zo Bennett, PTA [address]   9/10/2020 10:00 AM Zo Bennett, PTA [address]   9/11/2020  9:15 AM Zo Bennett, PTA [address]   9/14/2020  9:15 AM Zo Bennett, PTA [address]   9/16/2020  9:15 AM Zo Bennett, PTA [address]   9/17/2020 11:10 AM Juani Irvin PA-C [facility]   9/18/2020 10:00 AM Zo Bennett, PTA [address]   9/21/2020  9:15 AM Zo Bennett, PTA [address]   9/23/2020 10:00 AM Kristie Alcazar, PTA [facility]   9/25/2020 10:00 AM Torey ,  [facility]   9/28/2020  9:15 AM Rakesh Nair PTA [address]   9/30/2020 10:00 AM Rakesh Nair PTA [address]   10/2/2020 10:00 AM Rakesh Nair, PTA [address]

## 2020-09-04 ENCOUNTER — HOSPITAL ENCOUNTER (OUTPATIENT)
Dept: PHYSICAL THERAPY | Age: 60
Discharge: HOME | End: 2020-09-04
Payer: OTHER GOVERNMENT

## 2020-09-04 PROCEDURE — 97110 THERAPEUTIC EXERCISES: CPT

## 2020-09-04 PROCEDURE — 97140 MANUAL THERAPY 1/> REGIONS: CPT

## 2020-09-04 NOTE — PROGRESS NOTES
PHYSICAL THERAPY - DAILY TREATMENT NOTE    Patient Name: Robyne Hammans        Date: 2020  : 1960   yes Patient  Verified  Visit #:      of   18  Insurance: Payor: DEENA / Plan: Phillip Farias 74 / Product Type:  /      In time: 335 Out time: 1020   Total Treatment Time: 60     Medicare/[employer] Time Tracking (below)   Total Timed Codes (min):  na 1:1 Treatment Time:  na     TREATMENT AREA =  Pain in left knee [M25.562]    SUBJECTIVE  Pain Level (on 0 to 10 scale):  4-5  / 10 stiffness, no pain   Medication Changes/New allergies or changes in medical history, any new surgeries or procedures?    no  If yes, update Summary List   Subjective Functional Status/Changes:  []  No changes reported     I have no pain right now just more of a stiffness in the knee. Definitely felt better after last session. OBJECTIVE    35 min Therapeutic Exercise:  [x]  See flow sheet   Rationale:      increase ROM, increase strength, improve coordination and improve balance to improve the patients ability to perform functional mobility activities with decrease c/o symptoms.       25 min Manual Therapy: Pat mob, AAROM knee flex/ext, HS stretch, STM VMO with knee flexed. Retrograde massage   Rationale:      decrease pain, increase ROM and increase tissue extensibility to improve patient's ability to perform functional mobility activities with decrease c/o symptoms.        Billed With/As:   [x] TE   [] TA   [] Neuro   [] Self Care Patient Education: [x] Review HEP    [] Progressed/Changed HEP based on:   [] positioning   [] body mechanics   [] transfers   [] heat/ice application    [] other:      Other Objective/Functional Measures:    PROM after manual: 0 - 105 degrees. Post Treatment Pain Level (on 0 to 10) scale:   3-4  / 10 stiffness     ASSESSMENT  Assessment/Changes in Function:     Patient instructed to perform pre gait rocking at home 1 minute at a time.   Patient was able to verbalize understanding and return demonstration as well. Patient was instructed that she could go to the school track and walk with the following guide line: 1 walk rest 5 minutes, 2nd walk rest 5 minutes, 3rd walk rest 5 minutes. This is to be performed     []  See Progress Note/Recertification   Patient will continue to benefit from skilled PT services to modify and progress therapeutic interventions, address functional mobility deficits, address ROM deficits, address strength deficits, analyze and address soft tissue restrictions and analyze and cue movement patterns to attain remaining goals. Progress toward goals / Updated goals: · Short Term Goals: To be accomplished in  1-2  weeks:  1. Independent with HEP. Progressing well 9/2/2020  2. Decrease max pain 25-50% to assist with ADLs, self care. 3. AAROM left knee 0 - 95 deg to assist with dressing and self care. · Long Term Goals: To be accomplished in  4-6  weeks:  1.  Improve left knee AAROM 0 - 115 to assist with ADLs, dressing, self care. 2.  Improve FOTO Functional Status Score by 22 points in order to show significant functional improvement. 3.  Pt will be able to amb without deviation 150 ft community distances for safety in community.        PLAN  []  Upgrade activities as tolerated yes Continue plan of care   []  Discharge due to :    []  Other:      Therapist: Odilia Duverney, PTA    Date: 9/4/2020 Time: 6:20 AM     Future Appointments   Date Time Provider Jose Lucero   9/4/2020  9:15 AM Fay Urbina  [address] [facility]   9/8/2020  9:00 AM Fay Urbina PTA [address]   9/10/2020 10:00 AM Fay Urbina PTA [address]   9/11/2020  9:15 AM Fay Urbina PTA [address]   9/14/2020  9:15 AM Fay Urbina PTA [address]   9/16/2020  9:15 AM Fay Urbina  [address] [facility]   9/17/2020 11:10 AM Jailyn [facility]ulp [facility]-C [facility]   9/18/2020 10:00 AM Fay Urbina  [address] [facility]   9/21/2020  9:15 AM [facility] 9/23/2020 10:00 AM Figueroa Eric, PTA [facility]   9/25/2020 10:00 AM Carmina Benton, PT [address]   9/28/2020  9:15 AM Figueroa Eric, PTA [address]   9/30/2020 10:00 AM Figueroa Eric, PTA [address]   10/2/2020 10:00 AM Figueroa Eric, PTA [address]

## 2020-09-08 ENCOUNTER — HOSPITAL ENCOUNTER (OUTPATIENT)
Dept: PHYSICAL THERAPY | Age: 60
Discharge: HOME | End: 2020-09-08
Payer: OTHER GOVERNMENT

## 2020-09-08 PROCEDURE — 97140 MANUAL THERAPY 1/> REGIONS: CPT

## 2020-09-08 PROCEDURE — 97110 THERAPEUTIC EXERCISES: CPT

## 2020-09-08 NOTE — PROGRESS NOTES
PHYSICAL THERAPY - DAILY TREATMENT NOTE    Patient Name: Bina Maria        Date: 2020  : 1960   yes Patient  Verified  Visit #:      18  Insurance: Payor:  / Plan: Phillip Farias 74 / Product Type:  /      In time: 900 Out time: 1005   Total Treatment Time: 65     Medicare/[facility] Time Tracking (below)   Total Timed Codes (min):  na 1:1 Treatment Time:  na     TREATMENT AREA =  Pain in left knee [M25.562]    SUBJECTIVE  Pain Level (on 0 to 10 scale):  0  / 10   Medication Changes/New allergies or changes in medical history, any new surgeries or procedures?    no  If yes, update Summary List   Subjective Functional Status/Changes:  []  No changes reported     Not having any pain, just a little bit of stiffness. OBJECTIVE    40 min Therapeutic Exercise:  [x]  See flow sheet   Rationale:      increase ROM, increase strength, improve coordination and improve balance to improve the patients ability to perform functional mobility activities with decrease c/o symptoms.       25 min Manual Therapy: Pat mob, AAROM knee flex/ext, HS stretch, STM VMO with knee flexed. Retrograde massage, prone fib mob. Rationale:      decrease pain, increase ROM and increase tissue extensibility to improve patient's ability to perform functional mobility activities with decrease c/o symptoms.         Billed With/As:   [x] TE   [] TA   [] Neuro   [] Self Care Patient Education: [x] Review HEP    [] Progressed/Changed HEP based on:   [] positioning   [] body mechanics   [] transfers   [] heat/ice application    [] other:      Other Objective/Functional Measures:    Patient TTP along fib head. Add SLR flexion, abd and ext 10 x 5\" each. Post Treatment Pain Level (on 0 to 10) scale:   0  / 10     ASSESSMENT  Assessment/Changes in Function:     Patient reported that she was able to walk 2 laps at the track. Also went grocery shopping.      []  See Progress Note/Recertification   Patient will continue to benefit from skilled PT services to modify and progress therapeutic interventions, address functional mobility deficits, address ROM deficits, address strength deficits, analyze and address soft tissue restrictions and analyze and cue movement patterns to attain remaining goals. Progress toward goals / Updated goals: · Short Term Goals: To be accomplished in  1-2  weeks:  1. Independent with HEP. Achieved 9/8/2020  2. Decrease max pain 25-50% to assist with ADLs, self care. Progressing well 9/8/2020  3. AAROM left knee 0 - 95 deg to assist with dressing and self care. Progressing well 9/8/2020  · Long Term Goals: To be accomplished in  4-6  weeks:  1.  Improve left knee AAROM 0 - 115 to assist with ADLs, dressing, self care. 2.  Improve FOTO Functional Status Score by 22 points in order to show significant functional improvement. 3.  Pt will be able to amb without deviation 150 ft community distances for safety in community.        PLAN  []  Upgrade activities as tolerated yes Continue plan of care   []  Discharge due to :    []  Other:      Therapist: Tawanna Mays PTA    Date: 9/8/2020 Time: 7:28 AM     Future Appointments   Date Time Provider Jose Lucero   9/8/2020  9:00 AM Ceola Likes, PTA [facility] [address]   9/10/2020 10:00 AM Ceola Likes, PTA [facility] [address]   9/11/2020  9:15 AM Ceola Likes, PTA [facility] [address]   9/14/2020  9:15 AM Ceola Likes, PTA [facility] [address]   9/16/2020  9:15 AM Ceola Likes, PTA [facility] [address]   9/17/2020 11:10 AM aMlka Prater PA-C [facility]   9/18/2020 10:00 AM Ceola Likes, PTA [facility] [address]   9/21/2020  9:15 AM Ceola Likes, PTA [facility] [address]   9/23/2020 10:00 AM Ceola Likes, PTA [facility]   9/25/2020 10:00 AM Chanell Obrien, PT [facility]   9/28/2020  9:15 AM Ceola Likes, PTA [facility] [address]   9/30/2020 10:00 AM Millicent Likes, [facility] [address]   10/2/2020 10:00 AM Millicent Thorpes, [facility] [address] [address]

## 2020-09-10 ENCOUNTER — HOSPITAL ENCOUNTER (OUTPATIENT)
Dept: PHYSICAL THERAPY | Age: 60
Discharge: HOME | End: 2020-09-10
Payer: OTHER GOVERNMENT

## 2020-09-10 PROCEDURE — 97110 THERAPEUTIC EXERCISES: CPT

## 2020-09-10 PROCEDURE — 97140 MANUAL THERAPY 1/> REGIONS: CPT

## 2020-09-10 NOTE — PROGRESS NOTES
PHYSICAL THERAPY - DAILY TREATMENT NOTE    Patient Name: Yolanda Howe        Date: 9/10/2020  : 1960   yes Patient  Verified  Visit #:      18  Insurance: Payor: DEENA / Plan: Dagmar Yepez / Product Type:  /      In time: 945 Out time: 1100   Total Treatment Time: 75     Medicare/[facility] Time Tracking (below)   Total Timed Codes (min):  na 1:1 Treatment Time:  na     TREATMENT AREA =  Pain in left knee [M25.562]    SUBJECTIVE  Pain Level (on 0 to 10 scale):  0  / 10   Medication Changes/New allergies or changes in medical history, any new surgeries or procedures?    no  If yes, update Summary List   Subjective Functional Status/Changes:  []  No changes reported     I had a little bit of calf pain last night but I was able to rub it out. This morning I woke up with no pain but about 10 minutes ago something tweeked in my knee and now it's a 2-3/10. OBJECTIVE    50 min Therapeutic Exercise:  [x]  See flow sheet   Rationale:      increase ROM, increase strength, improve coordination and improve balance to improve the patients ability to perform functional mobility activities with decrease c/o symptoms.      25 min Manual Therapy: Pat mob, AAROM knee flex/ext, HS stretch, STM VMO with knee flexed. Retrograde massage, prone fib mob. Prone translation mob for knee flexion   Rationale:      decrease pain, increase ROM and increase tissue extensibility to improve patient's ability to perform functional mobility activities with decrease c/o symptoms.       Billed With/As:   [x] TE   [] TA   [] Neuro   [] Self Care Patient Education: [x] Review HEP    [] Progressed/Changed HEP based on:   [] positioning   [] body mechanics   [] transfers   [] heat/ice application    [] other:      Other Objective/Functional Measures:    AROM knee flexion: 106, PROM knee flexion: 111    Max pain  3-4 /10, least pain   0/10, average pain  0 /10. Reports more stiffness in knee than pain.      Post Treatment Pain Level (on 0 to 10) scale:   0  / 10     ASSESSMENT  Assessment/Changes in Function:     Initiated forward step down in parallel bars on 4\" step. Support required at knee and vcing for decrease use of hip hiking. Add leg press: 100# dbl, 45# (L); 10 x each. []  See Progress Note/Recertification   Patient will continue to benefit from skilled PT services to modify and progress therapeutic interventions, address functional mobility deficits, address ROM deficits, address strength deficits, analyze and address soft tissue restrictions and analyze and cue movement patterns to attain remaining goals. Progress toward goals / Updated goals: · Short Term Goals: To be accomplished in  1-2  weeks:  1. Independent with HEP. Achieved 9/8/2020  2. Decrease max pain 25-50% to assist with ADLs, self care. Progressing well 9/8/2020  3. AAROM left knee 0 - 95 deg to assist with dressing and self care. Achieved 9/10/2020  · Long Term Goals: To be accomplished in  4-6  weeks:  1.  Improve left knee AAROM 0 - 115 to assist with ADLs, dressing, self care. Progressing well 9/10/2020  2.  Improve FOTO Functional Status Score by 22 points in order to show significant functional improvement. 3.  Pt will be able to amb without deviation 150 ft community distances for safety in community.        PLAN  []  Upgrade activities as tolerated yes Continue plan of care   []  Discharge due to :    []  Other:      Therapist: Raquel Guzman PTA    Date: 9/10/2020 Time: 7:26 AM     Future Appointments   Date Time Provider Joes Lucero   9/10/2020 10:00 AM Anu Escort, PTA [facility] [address]   9/11/2020  9:15 AM Anu Escort, PTA [facility] [address]   9/14/2020  9:15 AM Anu Escort, PTA [facility]   9/16/2020  9:15 AM Anu Escort, PTA [facility]   9/17/2020 11:10 AM Gray Thomas PA-C [facility]   9/18/2020 10:00 AM Anu Escort, PTA [facility]   9/21/2020  9:15 AM Anu Escort, PTA [place] [facility]   9/23/2020 10:00 AM Jennifer Plummer [facility]   9/25/2020 10:00 AM Carmelina Hansen, PT [facility] [address]   9/28/2020  9:15 AM Ramon Singh, PTA [facility] [address]   9/30/2020 10:00 AM Ramon Singh, PTA [facility] [address]   10/2/2020 10:00 AM Raomn Singh, PTA [facility] [address]

## 2020-09-11 ENCOUNTER — HOSPITAL ENCOUNTER (OUTPATIENT)
Dept: PHYSICAL THERAPY | Age: 60
Discharge: HOME | End: 2020-09-11
Payer: OTHER GOVERNMENT

## 2020-09-11 PROCEDURE — 97110 THERAPEUTIC EXERCISES: CPT

## 2020-09-11 PROCEDURE — 97140 MANUAL THERAPY 1/> REGIONS: CPT

## 2020-09-11 NOTE — PROGRESS NOTES
PHYSICAL THERAPY - DAILY TREATMENT NOTE    Patient Name: Keturah Vizcaino        Date: 2020  : 1960   yes Patient  Verified  Visit #:      18  Insurance: Payor:  / Plan: Phillip Farias 74 / Product Type:  /      In time: 650 Out time: 5259   Total Treatment Time: 60     Medicare/[employer] Time Tracking (below)   Total Timed Codes (min):  na 1:1 Treatment Time:  na     TREATMENT AREA =  Pain in left knee [M25.562]    SUBJECTIVE  Pain Level (on 0 to 10 scale):  0  / 10   Medication Changes/New allergies or changes in medical history, any new surgeries or procedures?    no  If yes, update Summary List   Subjective Functional Status/Changes:  []  No changes reported     I just have a lot of stiffness today. OBJECTIVE    45 min Therapeutic Exercise:  [x]  See flow sheet   Rationale:      increase ROM, increase strength, improve coordination and improve balance to improve the patients ability to  perform functional mobility activities with decrease c/o symptoms.        15 min Manual Therapy: Pat mob, AAROM knee flex/ext, HS stretch, STM VMO with knee flexed. Retrograde massage, prone fib mob. Prone translation mob for knee flexion   Rationale:      decrease pain, increase ROM and increase tissue extensibility to improve patient's ability to  perform functional mobility activities with decrease c/o symptoms.      Billed With/As:   [] TE   [] TA   [] Neuro   [] Self Care Patient Education: [x] Review HEP    [] Progressed/Changed HEP based on:   [] positioning   [] body mechanics   [] transfers   [] heat/ice application    [] other:      Other Objective/Functional Measures: FOTO: 54  GROC 7     Post Treatment Pain Level (on 0 to 10) scale:   0  / 10     ASSESSMENT  Assessment/Changes in Function:     Patient reports ~ 90% improvement with all functional mobility activities. Continues to have difficulty with descending stairs.      []  See Progress Note/Recertification   Patient will continue to benefit from skilled PT services to modify and progress therapeutic interventions, address functional mobility deficits, address ROM deficits, address strength deficits, analyze and address soft tissue restrictions and analyze and cue movement patterns to attain remaining goals. Progress toward goals / Updated goals: · Short Term Goals: To be accomplished in  1-2  weeks:  1. Independent with HEP. Achieved 9/8/2020  2. Decrease max pain 25-50% to assist with ADLs, self care. Progressing well 9/8/2020  3. AAROM left knee 0 - 95 deg to assist with dressing and self care. Achieved 9/10/2020  · Long Term Goals: To be accomplished in  4-6  weeks:  1.  Improve left knee AAROM 0 - 115 to assist with ADLs, dressing, self care. Progressing well 9/10/2020  2.  Improve FOTO Functional Status Score by 22 points in order to show significant functional improvement. Achieved 9/11/2020  3.  Pt will be able to amb without deviation 150 ft community distances for safety in community.        PLAN  []  Upgrade activities as tolerated yes Continue plan of care   []  Discharge due to :    []  Other:      Therapist: Marco Miguel PTA    Date: 9/11/2020 Time: 6:34 AM     Future Appointments   Date Time Provider Jose Lucero   9/11/2020  9:15 AM Myrl Raw, PTA [address] [address]   9/14/2020  9:15 AM Myrl Raw, PTA [facility] [address]   9/16/2020  9:15 AM Myrl Raw, PTA [facility]   9/17/2020 11:10 AM Camila Guerrero PA-C [facility]   9/18/2020 10:00 AM Myrl Raw, PTA [facility] [address]   9/21/2020  9:15 AM Myrl Raw, PTA [facility] [address]   9/23/2020 10:00 AM Myrl Raw,  [address] [facility]   9/25/2020 10:00 AM Marianne Abraham  [address] [facility]   9/28/2020  9:15 AM Myrl Raw, PTA [facility] [address]   9/30/2020 10:00 AM Myrl Raw, PTA [facility] [address]   10/2/2020 10:00 AM Myrl Raw, PTA [address]

## 2020-09-14 ENCOUNTER — HOSPITAL ENCOUNTER (OUTPATIENT)
Dept: PHYSICAL THERAPY | Age: 60
Discharge: HOME | End: 2020-09-14
Payer: OTHER GOVERNMENT

## 2020-09-14 PROCEDURE — 97110 THERAPEUTIC EXERCISES: CPT

## 2020-09-14 PROCEDURE — 97140 MANUAL THERAPY 1/> REGIONS: CPT

## 2020-09-14 NOTE — PROGRESS NOTES
PHYSICAL THERAPY - DAILY TREATMENT NOTE    Patient Name: Calli Adams        Date: 2020  : 1960   yes Patient  Verified  Visit #:      18  Insurance: Payor: DEENA / Plan: Phillip Farias 74 / Product Type:  /      In time: 746 Out time: 9892   Total Treatment Time: 80     Medicare/[employer] Time Tracking (below)   Total Timed Codes (min):  na 1:1 Treatment Time:  na     TREATMENT AREA =  Pain in left knee [M25.562]    SUBJECTIVE  Pain Level (on 0 to 10 scale):  2-3  / 10   Medication Changes/New allergies or changes in medical history, any new surgeries or procedures?    no  If yes, update Summary List   Subjective Functional Status/Changes:  []  No changes reported     I had a really tough time with the stiffness this past weekend. And I could do a lot of errands. I also feel a lot of cunking in my knee. OBJECTIVE      50 min Therapeutic Exercise:  [x]  See flow sheet   Rationale:      increase ROM, increase strength, improve coordination and improve balance to improve the patients ability to perform functional mobility activities with decrease c/o symptoms.         30 min Manual Therapy: Pat mob, AAROM knee flex/ext, HS stretch, STM VMO with knee flexed. Retrograde massage, prone fib mob. Prone translation mob for knee flexion   Rationale:      decrease pain, increase ROM and increase tissue extensibility to improve patient's ability to perform functional mobility activities with decrease c/o symptoms.       Billed With/As:   [x] TE   [] TA   [] Neuro   [] Self Care Patient Education: [x] Review HEP    [] Progressed/Changed HEP based on:   [] positioning   [] body mechanics   [] transfers   [] heat/ice application    [] other:      Other Objective/Functional Measures:    112 AAROM knee flexion  Add treadmill @ 2.0 for 5 minutes.        Post Treatment Pain Level (on 0 to 10) scale:   0  / 10     ASSESSMENT  Assessment/Changes in Function:     Patient was able to demonstrate WNL gai ton treadmill, no signs of hip hiking. Performed reciprocal pattern with asha negotiation in parallel bars. []  See Progress Note/Recertification   Patient will continue to benefit from skilled PT services to modify and progress therapeutic interventions, address functional mobility deficits, address ROM deficits, address strength deficits, analyze and address soft tissue restrictions and analyze and cue movement patterns to attain remaining goals. Progress toward goals / Updated goals:    New Goals to be achieved in _4__  weeks:  1. Improve left knee AAROM 0 - 115 to assist with ADLs, dressing, self care   2. Improve FOTO Functional Status Score by 22 points in order to show significant functional improvement   3. Pt will be able to amb without deviation 150 ft community distances for safety in community. No change toward new goals today.      PLAN  []  Upgrade activities as tolerated yes Continue plan of care   []  Discharge due to :    []  Other:      Therapist: Mercedes Morgan PTA    Date: 9/14/2020 Time: 6:38 AM     Future Appointments   Date Time Provider Jose Lucero   9/14/2020  9:15 AM Kati Merna, PTA [facility] [address]   9/16/2020  9:15 AM Kati Merna, PTA [facility] [address]   9/17/2020 11:10 AM Lilian Sandy PA-C [facility]   9/18/2020 10:00 AM Kati Merna, PTA [facility] [address]   9/21/2020  9:15 AM Kati Merna, PTA [facility] [address]   9/23/2020 10:00 AM Kati Lazo PTA [facility]   9/25/2020 10:00 AM Debbie Rcih PT [facility]   9/28/2020  9:15 AM Kati Merna, PTA [facility] [address]   9/30/2020 10:00 AM Kati Merna, PTA [facility] [address]   10/2/2020 10:00 AM Kati Merna, PTA [facility] [address]

## 2020-09-14 NOTE — PROGRESS NOTES
[address] PHYSICAL THERAPY  [address] [place] [address] [address],Shahrzad Tamez, [address] [facility] - Phone: [phone]  Fax: [phone]   PROGRESS NOTE    Patient Name: Edgard Arora : 1960   Treatment/Medical  Diagnosis: Pain in left knee [M25.562]     Referral Source: Erick Cantrell MD, Dr. Ruperto Bernal     Date of Initial Visit: 2020 Attended Visits: 8 Missed Visits: [address] has been seen at our clinic 2-3x/wk for a total of 8 visits. Pt treatment has consisted of aerobic warm-up with stationary bike, therapeutic exercise for knee ROM, LE strengthening, hip/core strengthening, modalities prn and manual therapy (Pat mob, AAROM knee flex/ext, HS stretch, STM VMO with knee flexed. Retrograde massage, prone fib mob. Prone translation mob for knee flexion)    CURRENT STATUS  Pt has had a good tolerance to physical therapy treatment. Patient reports ~ 90% improvement with all functional mobility activities. Continues to have difficulty with descending stairs. Max pain  3-4 /10, least pain   0/10, average pain  0 /10. Reports more stiffness in knee than pain. GROC 7: a very great deal better. Patient is now able to ambulate community distances. Patient has begun to demonstrate [facility] gait pattern: increase of knee flexion and decrease hip hiking, increase knee extension and improved heel strike all on the left LE. Patient exhibits increase anxiety with descending of 4\" platform and requires max vc'ing to perform proper motion of hip and knee during activity. PAtient reports that she has been performing a walking routine a the [employer] track. Patient is compliant with HEP. · Short Term Goals: To be accomplished in  1-2  weeks:  1. Independent with HEP. Achieved 2020  2. Decrease max pain 25-50% to assist with ADLs, self care. Achieved 9/10/2020  3.  AAROM left knee 0 - 95 deg to assist with dressing and self care. Achieved 9/10/2020    Goal/Measure of Progress Goal Met? 1. Improve left knee AAROM 0 - 115 to assist with ADLs, dressing, self care   Status at last Eval: AAROM left knee 3 - 84 Current Status: AROM knee flexion: 106, PROM knee flexion: 111  AAROM extension: 0 Progressing well   2. Improve FOTO Functional Status Score by 22 points in order to show significant functional improvement   Status at last Eval: 35 Current Status: 47 Progressing well: 19 point improvement   3. Pt will be able to amb without deviation 150 ft community distances for safety in community. Status at last Eval: Unable  Current Status: See above Progressing well     New Goals to be achieved in _4__  weeks:  1. Improve left knee AAROM 0 - 115 to assist with ADLs, dressing, self care   2. Improve FOTO Functional Status Score by 22 points in order to show significant functional improvement   3. Pt will be able to amb without deviation 150 ft community distances for safety in community. RECOMMENDATIONS  We would like to continue treatment 2x/wk for 4weeks to progress patient further toward goals. Please advise. Thank you. If you have any questions/comments please contact us directly at [phone]. Thank you for allowing us to assist in the care of your patient.       LPTA Signature: Leisa Bunch, PTA Date: 9/14/2020   PT Signature: Yael Albarran PT, DPT, OCS, CSCS Time: 6:48 AM     NOTE TO PHYSICIAN:  PLEASE COMPLETE THE ORDERS BELOW AND FAX TO   [facility]: [phone]  If you are unable to process this request in 24 hours please contact our office: [phone]    ___ I have read the above report and request that my patient continue as recommended.   ___ I have read the above report and request that my patient continue therapy with the following changes/special instructions:_________________________________________________________   ___ I have read the above report and request that my patient be discharged from therapy.      Physician Signature:        Date:       Time:

## 2020-09-16 ENCOUNTER — HOSPITAL ENCOUNTER (OUTPATIENT)
Dept: PHYSICAL THERAPY | Age: 60
Discharge: HOME | End: 2020-09-16
Payer: OTHER GOVERNMENT

## 2020-09-16 PROCEDURE — 97140 MANUAL THERAPY 1/> REGIONS: CPT

## 2020-09-16 PROCEDURE — 97110 THERAPEUTIC EXERCISES: CPT

## 2020-09-16 NOTE — PROGRESS NOTES
PHYSICAL THERAPY - DAILY TREATMENT NOTE    Patient Name: Luciano Mittal        Date: 2020  : 1960   yes Patient  Verified  Visit #:    Insurance: Payor:  / Plan: Emma Poisson / Product Type:  /      In time: 284 Out time: 1025   Total Treatment Time: 70     Medicare/[facility] Time Tracking (below)   Total Timed Codes (min):  na 1:1 Treatment Time:  na     TREATMENT AREA =  Pain in left knee [M25.562]    SUBJECTIVE  Pain Level (on 0 to 10 scale):  0  / 10   Medication Changes/New allergies or changes in medical history, any new surgeries or procedures?    no  If yes, update Summary List   Subjective Functional Status/Changes:  []  No changes reported     No pain now, stiff and sore in that one spot on the back of my knee. Worked it out real hard yesterday. Did the steps and squats. OBJECTIVE    40 min Therapeutic Exercise:  [x]  See flow sheet   Rationale:      increase ROM, increase strength, improve coordination and improve balance to improve the patients ability to perform functional mobility activities with decrease c/o symptoms.         30 min Manual Therapy: Pat mob, AAROM knee flex/ext, HS stretch, STM VMO with knee flexed. Retrograde massage, prone fib mob. Prone translation mob for knee flexion   Rationale:      decrease pain, increase ROM and increase tissue extensibility to improve patient's ability to perform functional mobility activities with decrease c/o symptoms.          Billed With/As:   [x] TE   [] TA   [] Neuro   [] Self Care Patient Education: [x] Review HEP    [] Progressed/Changed HEP based on:   [] positioning   [] body mechanics   [] transfers   [] heat/ice application    [] other:      Other Objective/Functional Measures:    Knee flexion after manual 115 degrees.      Post Treatment Pain Level (on 0 to 10) scale:   0  / 10     ASSESSMENT  Assessment/Changes in Function:     Good tolerance to ambulating on treadmill, progressing steadily with near normal gait. Patient continues to benefit from all therapeutic interventions as evident by increase ROM, strength and gait. []  See Progress Note/Recertification   Patient will continue to benefit from skilled PT services to modify and progress therapeutic interventions, address functional mobility deficits, address ROM deficits, address strength deficits, analyze and address soft tissue restrictions and analyze and cue movement patterns to attain remaining goals. Progress toward goals / Updated goals:    New Goals   1.  Improve left knee AAROM 0 - 115 to assist with ADLs, dressing, self care: progressing well 9/16/2020   2.  Improve FOTO Functional Status Score by 22 points in order to show significant functional improvement   3.  Pt will be able to amb without deviation 150 ft community distances for safety in community.           PLAN  []  Upgrade activities as tolerated yes Continue plan of care   []  Discharge due to :    []  Other:      Therapist: Rizwan Hoff PTA    Date: 9/16/2020 Time: 6:29 AM     Future Appointments   Date Time Provider Jose Lucero   9/16/2020  9:15 AM Jens Singh, PTA [facility] [address]   9/17/2020 11:10 AM Ronnell Zambrano PA-C [facility]   9/18/2020 10:00 AM Jens Puffer, PTA [address] [address]   9/21/2020  9:15 AM Jens Puffer, PTA [facility] [address]   9/23/2020 10:00 AM Jens Singh, [place]   9/25/2020 10:00 AM Anastacio Oviedo PT [facility]   9/28/2020  9:15 AM Jens Puffer, PTA [facility] [address]   9/30/2020 10:00 AM Jens Puffer, PTA [facility] [address]   10/2/2020 10:00 AM Jens Puffer, PTA [facility] [address]

## 2020-09-17 ENCOUNTER — OFFICE VISIT (OUTPATIENT)
Dept: ORTHOPEDIC SURGERY | Facility: CLINIC | Age: 60
End: 2020-09-17

## 2020-09-17 VITALS
TEMPERATURE: 96.8 F | SYSTOLIC BLOOD PRESSURE: 101 MMHG | WEIGHT: 196 LBS | BODY MASS INDEX: 30.76 KG/M2 | RESPIRATION RATE: 18 BRPM | HEART RATE: 97 BPM | HEIGHT: 67 IN | OXYGEN SATURATION: 89 % | DIASTOLIC BLOOD PRESSURE: 69 MMHG

## 2020-09-17 DIAGNOSIS — M25.562 LEFT KNEE PAIN, UNSPECIFIED CHRONICITY: ICD-10-CM

## 2020-09-17 DIAGNOSIS — Z96.652 STATUS POST LEFT KNEE REPLACEMENT: Primary | ICD-10-CM

## 2020-09-17 RX ORDER — DICLOFENAC SODIUM 10 MG/G
4 GEL TOPICAL 4 TIMES DAILY
Qty: 100 G | Refills: 4 | Status: SHIPPED | OUTPATIENT
Start: 2020-09-17

## 2020-09-17 RX ORDER — HYDROCODONE BITARTRATE AND ACETAMINOPHEN 7.5; 325 MG/1; MG/1
1-2 TABLET ORAL
Qty: 42 TAB | Refills: 0 | Status: SHIPPED | OUTPATIENT
Start: 2020-09-17 | End: 2020-09-24

## 2020-09-17 NOTE — PROGRESS NOTES
1. Have you been to the ER, urgent care clinic since your last visit? Hospitalized since your last visit? No    2. Have you seen or consulted any other health care providers outside of the [address] [place] [address] since your last visit? Include any pap smears or colon screening.  No

## 2020-09-17 NOTE — PROGRESS NOTES
[address] [place] [address]  [phone]           Patient: Edgard Arora                MRN: [id number]       SSN: [id number]  YOB: 1960        AGE: 63 y.o. SEX: female  Body mass index is 30.7 kg/m². PCP: Erick Cantrell MD  09/17/20      This office note has been dictated. REVIEW OF SYSTEMS:  Constitutional: Negative for fever, chills, weight loss and malaise/fatigue. HENT: Negative. Eyes: Negative. Respiratory: Negative. Cardiovascular: Negative. Gastrointestinal: No bowel incontinence or constipation. Genitourinary: No bladder incontinence or saddle anesthesia. Skin: Negative. Neurological: Negative. Endo/Heme/Allergies: Negative. Psychiatric/Behavioral: Negative. Musculoskeletal: As per HPI above. Past Medical History:   Diagnosis Date    Diabetes [facility])          Current Outpatient Medications:     rosuvastatin (CRESTOR) 10 mg tablet, Take 10 mg by mouth nightly., Disp: , Rfl:     multivit-min/ferrous fumarate (MULTI VITAMIN PO), Take 1 Tab by mouth daily. , Disp: , Rfl:     aspirin (ASPIRIN) 325 mg tablet, Take 1 Tab by mouth two (2) times a day., Disp: 60 Tab, Rfl: 0    docusate sodium (Colace) 100 mg capsule, Take 1 Cap by mouth two (2) times a day for 90 days. , Disp: 60 Cap, Rfl: 2    celecoxib (CeleBREX) 200 mg capsule, Take 1 Cap by mouth two (2) times a day for 90 days. , Disp: 60 Cap, Rfl: 2    cephALEXin (Keflex) 500 mg capsule, Take 1 Cap by mouth four (4) times daily. , Disp: 8 Cap, Rfl: 0    empagliflozin (JARDIANCE PO), Take 25 mg by mouth daily. , Disp: , Rfl:     metFORMIN (GLUCOPHAGE) 1,000 mg tablet, Take 1,000 mg by mouth two (2) times daily (with meals). , Disp: , Rfl:     dulaglutide (TRULICITY) 1.5 SZ/5.8 mL sub-q pen, 1.5 mg by SubCUTAneous route every seven (7) days. , Disp: , Rfl:     ergocalciferol (VITAMIN D2) 50,000 unit capsule, Take 50,000 Units by mouth., Disp: , Rfl:     No Known Allergies    Social History     Socioeconomic History    Marital status: UNKNOWN     Spouse name: Not on file    Number of children: Not on file    Years of education: Not on file    Highest education level: Not on file   Occupational History    Not on file   Social Needs    Financial resource strain: Not on file    Food insecurity     Worry: Not on file     Inability: Not on file    Transportation needs     Medical: Not on file     Non-medical: Not on file   Tobacco Use    Smoking status: Former Smoker    Smokeless tobacco: Never Used   Substance and Sexual Activity    Alcohol use: Not Currently    Drug use: Never    Sexual activity: Not on file   Lifestyle    Physical activity     Days per week: Not on file     Minutes per session: Not on file    Stress: Not on file   Relationships    Social connections     Talks on phone: Not on file     Gets together: Not on file     Attends Zoroastrian service: Not on file     Active member of club or organization: Not on file     Attends meetings of clubs or organizations: Not on file     Relationship status: Not on file    Intimate partner violence     Fear of current or ex partner: Not on file     Emotionally abused: Not on file     Physically abused: Not on file     Forced sexual activity: Not on file   Other Topics Concern    Not on file   Social History Narrative    Not on file       Past Surgical History:   Procedure Laterality Date    HX GYN      c section    HX KNEE ARTHROSCOPY Right     ankle, fx     HX ORTHOPAEDIC Bilateral     minicus repair            Patient seen evaluate today for her left knee. She is now about 6-week status post left total replacement surgery. Overall she doing very well. Does have a some tightness in the back of her knee and a knot that she feels on occasion. It is improving. Therapy does some massage on it which also helps. She is continues on an anti-inflammatory.   Pain is well controlled. Patient denies recent fevers, chills, chest pain, SOB, or injuries. No recent systemic changes noted. A 12-point review of systems is performed today. Pertinent positives are noted. All other systems reviewed and otherwise are negative. Physical exam: General: Alert and oriented x3, nad.  well-developed, well nourished. normal affect, AF. NC/AT, EOMI, neck supple, trachea midline, no JVD present. Breathing is non-labored. Examination of the left knee reveals skin intact. There is no erythema, ecchymosis, warmth. There are no signs of infection or cellulitis. Range of motion is full extension. She has 115 degrees of flexion. Does have some tenderness to palpation the posterior aspect of her knee on the semi-tendinosis tendon with possibly a small knot felt. There are no signs for DVT present. She has negative calf tenderness negative Homans. No increase in discomfort with ankle range of motion. Radiographs obtained in office today 9/17/2020 at the [facility] location include AP, lateral, skyline of the left knee shows a total knee components to be well fixed without evidence of loosening or fracture. Assessment: Status post left knee replacement    Plan: At this point, she will continue physical therapy. She is given prescription for Voltaren gel to apply the posterior aspect of the knee 3-4 times a day. A refill of pain medicine was provided. We will order a ultrasound of the posterior left knee to evaluate the hamstring tendons. We will see her back in office in 4 weeks time for evaluation. She will call any questions or concerns that shall arise.             JR Mc SMITH, NILESH, ATC No

## 2020-09-18 ENCOUNTER — HOSPITAL ENCOUNTER (OUTPATIENT)
Dept: PHYSICAL THERAPY | Age: 60
Discharge: HOME | End: 2020-09-18
Payer: OTHER GOVERNMENT

## 2020-09-18 PROCEDURE — 97140 MANUAL THERAPY 1/> REGIONS: CPT

## 2020-09-18 PROCEDURE — 97110 THERAPEUTIC EXERCISES: CPT

## 2020-09-18 NOTE — PROGRESS NOTES
PHYSICAL THERAPY - DAILY TREATMENT NOTE    Patient Name: Kianna Modi        Date: 2020  : 1960   yes Patient  Verified  Visit #:     Insurance: Payor:  / Plan: Leyla Situ / Product Type:  /      In time: 1000 Out time: 1055   Total Treatment Time: 55     Medicare/[employer] Time Tracking (below)   Total Timed Codes (min):  na 1:1 Treatment Time:  na     TREATMENT AREA =  Pain in left knee [M25.562]    SUBJECTIVE  Pain Level (on 0 to 10 scale):  0  / 10   Medication Changes/New allergies or changes in medical history, any new surgeries or procedures?    no  If yes, update Summary List   Subjective Functional Status/Changes:  []  No changes reported     The visit went really well with the doctor and he was happy with my motion. He wants to do an ultrasound on the back tendon. OBJECTIVE    40 min Therapeutic Exercise:  [x]  See flow sheet   Rationale:      increase ROM, increase strength, improve coordination and improve balance to improve the patients ability to perform functional mobility activities with decrease c/o symptoms.         15 min Manual Therapy: Pat mob, AAROM knee flex/ext, HS stretch, STM VMO with knee flexed. Retrograde massage, prone fib mob. Prone translation mob for knee flexion   Rationale:      decrease pain, increase ROM and increase tissue extensibility to improve patient's ability to perform functional mobility activities with decrease c/o symptoms.        Billed With/As:   [x] TE   [] TA   [] Neuro   [] Self Care Patient Education: [x] Review HEP    [] Progressed/Changed HEP based on:   [] positioning   [] body mechanics   [] transfers   [] heat/ice application    [] other:      Other Objective/Functional Measures:    TTP along fib head, insertion of ITband and along ITband    Improved SLR with quad set.      Post Treatment Pain Level (on 0 to 10) scale:   0  / 10     ASSESSMENT  Assessment/Changes in Function:     Sit <> stand from mat 10x2: decrease shift onto (L) lower extremity  Sit <> stand with 4 inch platform under (R) foot: increase difficulty with pressing through (R) LE to stand. Step down with 6\" platform: overal good tolerance demonstrates decrease hesitation and has good knee motion. Slight increase of hip hiking. []  See Progress Note/Recertification   Patient will continue to benefit from skilled PT services to modify and progress therapeutic interventions, address functional mobility deficits, address ROM deficits, address strength deficits, analyze and address soft tissue restrictions and analyze and cue movement patterns to attain remaining goals. Progress toward goals / Updated goals:    New Goals   1.  Improve left knee AAROM 0 - 115 to assist with ADLs, dressing, self care   2.  Improve FOTO Functional Status Score by 22 points in order to show significant functional improvement   3.  Pt will be able to amb without deviation 150 ft community distances for safety in community.           PLAN  []  Upgrade activities as tolerated yes Continue plan of care   []  Discharge due to :    []  Other:      Therapist: Edvin Muñoz PTA    Date: 9/18/2020 Time: 6:31 AM     Future Appointments   Date Time Provider Jose Lucero   9/18/2020 10:00 AM [facility]   9/21/2020  9:15 AM LAURY [facility]el [address]   9/23/2020 10:00 AM Fidel Stringer PTA [facility]   9/25/2020 10:00 AM Prudencio Singh PT [facility]   9/28/2020  9:15 AM [facility] [address] [address]   9/30/2020 10:00 AM Fidel Stringer PTA [facility]   10/2/2020 10:00 AM Fidel Stringer PTA [facility]

## 2020-09-21 ENCOUNTER — HOSPITAL ENCOUNTER (OUTPATIENT)
Dept: PHYSICAL THERAPY | Age: 60
Discharge: HOME | End: 2020-09-21
Payer: OTHER GOVERNMENT

## 2020-09-21 PROCEDURE — 97140 MANUAL THERAPY 1/> REGIONS: CPT

## 2020-09-21 PROCEDURE — 97110 THERAPEUTIC EXERCISES: CPT

## 2020-09-21 NOTE — PROGRESS NOTES
PHYSICAL THERAPY - DAILY TREATMENT NOTE    Patient Name: Rachelle Kolb        Date: 2020  : 1960   yes Patient  Verified  Visit #:   10   of   18  Insurance: Payor:  / Plan: Sarimary lou Moore / Product Type:  /      In time: 847 Out time: 1010   Total Treatment Time: 55     Medicare/[facility] Time Tracking (below)   Total Timed Codes (min):  na 1:1 Treatment Time:  na     TREATMENT AREA =  Pain in left knee [M25.562]    SUBJECTIVE  Pain Level (on 0 to 10 scale):  6  / 10   Medication Changes/New allergies or changes in medical history, any new surgeries or procedures?    no  If yes, update Summary List   Subjective Functional Status/Changes:  []  No changes reported      I walked my dogs for like 45 minutes and my knee started to lock up a few times and now i'm in a lot of pain. OBJECTIVE    30 min Therapeutic Exercise:  [x]  See flow sheet   Rationale:      increase ROM, increase strength, improve coordination and improve balance to improve the patients ability to perform functional mobility activities with decrease c/o symptoms.             25 min Manual Therapy: Pat mob, AAROM knee flex/ext, HS stretch, STM VMO with knee flexed. Retrograde massage, prone fib mob. Prone translation mob for knee flexion   Rationale:      decrease pain, increase ROM and increase tissue extensibility to improve patient's ability to perform functional mobility activities with decrease c/o symptoms.            Billed With/As:   [x] TE   [] TA   [] Neuro   [] Self Care Patient Education: [x] Review HEP    [] Progressed/Changed HEP based on:   [] positioning   [] body mechanics   [] transfers   [] heat/ice application    [] other:      Other Objective/Functional Measures:    Increase TTP along lateral knee at fib head region and ITband insertions. Increase tenderness with patella mobs.   No change with extension ROM, slight tightness and stiffness with knee flexion  Increase antalgic gait secondary to stiffness/pain     Post Treatment Pain Level (on 0 to 10) scale:   2  / 10     ASSESSMENT  Assessment/Changes in Function:     Patient has been instructed to decrease walking activities between now and next visit to assist with symptoms from this past weekend activities. []  See Progress Note/Recertification   Patient will continue to benefit from skilled PT services to modify and progress therapeutic interventions, address functional mobility deficits, address ROM deficits, address strength deficits, analyze and address soft tissue restrictions and analyze and cue movement patterns to attain remaining goals. Progress toward goals / Updated goals:    New Goals   1.  Improve left knee AAROM 0 - 115 to assist with ADLs, dressing, self care   2.  Improve FOTO Functional Status Score by 22 points in order to show significant functional improvement   3.  Pt will be able to amb without deviation 150 ft community distances for safety in community.           PLAN  []  Upgrade activities as tolerated yes Continue plan of care   []  Discharge due to :    []  Other:      Therapist: Alyx Lange PTA    Date: 9/21/2020 Time: 6:28 AM     Future Appointments   Date Time Provider Jose Lucero   9/21/2020  9:15 AM [facility] [address]   9/23/2020 10:00 AM Ramon Singh PTA  [facility]   9/25/2020 10:00 AM Carmelina Hansen PT  [facility]   9/28/2020  9:15 AM [facility] [address]   9/30/2020 10:00 AM [facility] [address] [address]   10/2/2020 10:00 AM Ramon Singh PTA [facility] [address]

## 2020-09-23 ENCOUNTER — HOSPITAL ENCOUNTER (OUTPATIENT)
Dept: PHYSICAL THERAPY | Age: 60
Discharge: HOME | End: 2020-09-23
Payer: OTHER GOVERNMENT

## 2020-09-23 PROCEDURE — 97140 MANUAL THERAPY 1/> REGIONS: CPT

## 2020-09-23 PROCEDURE — 97110 THERAPEUTIC EXERCISES: CPT

## 2020-09-23 NOTE — PROGRESS NOTES
PHYSICAL THERAPY - DAILY TREATMENT NOTE    Patient Name: Zuleima Rosa        Date: 2020  : 1960   yes Patient  Verified  Visit #:     Insurance: Payor: DEENA / Plan: Phillip Farias 74 / Product Type:  /      In time: 1005 Out time: 1055   Total Treatment Time: 50     Medicare/[facility] Time Tracking (below)   Total Timed Codes (min):  na 1:1 Treatment Time:  na     TREATMENT AREA =  Pain in left knee [M25.562]    SUBJECTIVE  Pain Level (on 0 to 10 scale):  0  / 10   Medication Changes/New allergies or changes in medical history, any new surgeries or procedures?    no  If yes, update Summary List   Subjective Functional Status/Changes:  []  No changes reported     I don't have pain in that spot today, but the sides of my knees are hurting          OBJECTIVE    25 min Therapeutic Exercise:  [x]  See flow sheet   Rationale:      increase ROM, increase strength, improve coordination and improve balance to improve the patients ability to perform functional mobility activities with decrease c/o symptoms.              25 min Manual Therapy: Pat mob, AAROM knee flex/ext, HS stretch, STM VMO with knee flexed. Retrograde massage, prone fib mob. Prone translation mob for knee flexion   Rationale:      decrease pain, increase ROM and increase tissue extensibility to improve patient's ability to perform functional mobility activities with decrease c/o symptoms.              Billed With/As:   [x] TE   [] TA   [] Neuro   [] Self Care Patient Education: [x] Review HEP    [] Progressed/Changed HEP based on:   [] positioning   [] body mechanics   [] transfers   [] heat/ice application    [] other:      Other Objective/Functional Measures:    PROM flexion 120 degrees. Post Treatment Pain Level (on 0 to 10) scale:   0  / 10     ASSESSMENT  Assessment/Changes in Function:     Reports decrease pain in knee since not overworking her knee.     Patient's gait is WNL on treadmil, but is unable to carry over gait when she comes off treadmill. Patient uses hip hiking with swing phase.     []  See Progress Note/Recertification   Patient will continue to benefit from skilled PT services to modify and progress therapeutic interventions, address functional mobility deficits, address ROM deficits, address strength deficits, analyze and address soft tissue restrictions and analyze and cue movement patterns to attain remaining goals. Progress toward goals / Updated goals:    New Goals   1.  Improve left knee AAROM 0 - 115 to assist with ADLs, dressing, self care: progressing well 9/23/2020   2.  Improve FOTO Functional Status Score by 22 points in order to show significant functional improvement   3.  Pt will be able to amb without deviation 150 ft community distances for safety in community.           PLAN  []  Upgrade activities as tolerated yes Continue plan of care   []  Discharge due to :    []  Other:      Therapist: Raquel Guzman PTA    Date: 9/23/2020 Time: 6:26 AM     Future Appointments   Date Time Provider Jose Lucero   9/23/2020 10:00 AM Anu Escort,  [address] [facility]   9/25/2020 10:00 AM Linus Linares, PT [facility] [address]   9/28/2020  9:15 AM Anu Escort, PTA [address] [address]   9/30/2020 10:00 AM Anu Escort, PTA [address] [address]   10/2/2020 10:00 AM Anu Escort, PTA [address] [address]   10/15/2020 11:00 AM Gray Thomas PA-C [facility]

## 2020-09-25 ENCOUNTER — HOSPITAL ENCOUNTER (OUTPATIENT)
Dept: PHYSICAL THERAPY | Age: 60
Discharge: HOME | End: 2020-09-25
Payer: OTHER GOVERNMENT

## 2020-09-25 PROCEDURE — 97110 THERAPEUTIC EXERCISES: CPT

## 2020-09-25 PROCEDURE — 97140 MANUAL THERAPY 1/> REGIONS: CPT

## 2020-09-25 NOTE — PROGRESS NOTES
PHYSICAL THERAPY - DAILY TREATMENT NOTE    Patient Name: Angel Loge        Date: 2020  : 1960   yes Patient  Verified  Visit #:     Insurance: Payor: DEENA / Plan: Phillip Farias 74 / Product Type:  /      In time: 536 Out time: 4760   Total Treatment Time: 44     Medicare/[employer] Time Tracking (below)   Total Timed Codes (min):  na 1:1 Treatment Time:  na     TREATMENT AREA =  Pain in left knee [M25.562]    SUBJECTIVE  Pain Level (on 0 to 10 scale):  0  / 10   Medication Changes/New allergies or changes in medical history, any new surgeries or procedures?    no  If yes, update Summary List   Subjective Functional Status/Changes:  []  No changes reported     Patient reports she feels a little stiff this morning and used a heating pad to help with this. OBJECTIVE    29 min Therapeutic Exercise:  [x]  See flow sheet   Rationale:      increase ROM and increase strength to improve the patients ability to perform walking activities. 15 min Manual Therapy: STM to L VL, L quadriceps tendon, L patellar mobs, L knee flexion PROM   Rationale:      decrease pain, increase ROM, increase tissue extensibility and decrease trigger points to improve patient's ability to perform standing activities. Billed With/As:   [x] TE   [] TA   [] Neuro   [] Self Care Patient Education: [x] Review HEP    [] Progressed/Changed HEP based on:   [] positioning   [] body mechanics   [] transfers   [] heat/ice application    [] other:      Other Objective/Functional Measures:    L knee flexion AAROM to 121 degrees after MT treatment  Cued during step ups on producing force through L LE and avoiding hip hike compensation  Cued during squats to table to avoid weight shift to the R     Post Treatment Pain Level (on 0 to 10) scale:   0  / 10     ASSESSMENT  Assessment/Changes in Function:     Patient denied changes in symptoms post session.  Educated patient to focus on step ups and bodyweight squat exercises at home in order to improve quadriceps strength. []  See Progress Note/Recertification   Patient will continue to benefit from skilled PT services to modify and progress therapeutic interventions, address functional mobility deficits, address ROM deficits, address strength deficits, analyze and address soft tissue restrictions, analyze and cue movement patterns, analyze and modify body mechanics/ergonomics and assess and modify postural abnormalities to attain remaining goals.    Progress toward goals / Updated goals:    Progressing well with LTG#1     PLAN  [x]  Upgrade activities as tolerated yes Continue plan of care   []  Discharge due to :    []  Other:      Therapist: Daniel Ignacio PT    Date: 9/25/2020 Time: 10:42 AM     Future Appointments   Date Time Provider Jose Luceor   9/28/2020  9:15 AM [facility] [address]   9/30/2020 10:00 AM LAUR[facility] Leon [address]   10/1/2020  9:45 AM [facility] RAD US RM 2 [facility]   10/2/2020 10:00 AM LAURY Leon [address]   10/15/2020 11:00 AM Kat Rodriguez PA-C [facility] AMB

## 2020-09-28 ENCOUNTER — HOSPITAL ENCOUNTER (OUTPATIENT)
Dept: PHYSICAL THERAPY | Age: 60
Discharge: HOME | End: 2020-09-28
Payer: OTHER GOVERNMENT

## 2020-09-28 PROCEDURE — 97110 THERAPEUTIC EXERCISES: CPT

## 2020-09-28 PROCEDURE — 97140 MANUAL THERAPY 1/> REGIONS: CPT

## 2020-09-28 NOTE — PROGRESS NOTES
PHYSICAL THERAPY - DAILY TREATMENT NOTE    Patient Name: Caprice Sweet        Date: 2020  : 1960   yes Patient  Verified  Visit #:   15   of   20  Insurance: Payor:  / Plan: Phillip Farias 74 / Product Type:  /      In time: 432 Out time: 1020   Total Treatment Time: 60     Medicare/[employer] Time Tracking (below)   Total Timed Codes (min):  na 1:1 Treatment Time:  na     TREATMENT AREA =  Pain in left knee [M25.562]    SUBJECTIVE  Pain Level (on 0 to 10 scale):  0  / 10   Medication Changes/New allergies or changes in medical history, any new surgeries or procedures?    no  If yes, update Summary List   Subjective Functional Status/Changes:  []  No changes reported     No new c/o          OBJECTIVE    35 min Therapeutic Exercise:  [x]  See flow sheet   Rationale:      increase ROM, increase strength, improve coordination and improve balance to improve the patients ability to perform functional mobility activities with decrease c/o symptoms.         25 min Manual Therapy: Pat mob, AAROM knee flex/ext, HS stretch, STM VMO with knee flexed. Retrograde massage, prone fib mob. Prone translation mob for knee flexion   Rationale:      decrease pain, increase ROM and increase tissue extensibility to improve patient's ability to perform functional mobility activities with decrease c/o symptoms.        Billed With/As:   [x] TE   [] TA   [] Neuro   [] Self Care Patient Education: [x] Review HEP    [] Progressed/Changed HEP based on:   [] positioning   [] body mechanics   [] transfers   [] heat/ice application    [] other:      Other Objective/Functional Measures:    Difficulty negotiating small hurdles secondary to LOB requiring CGA. Patient also crosses center when placing foot down in front. Patient required [address] for foot placement. Post Treatment Pain Level (on 0 to 10) scale:   0/ 10     ASSESSMENT  Assessment/Changes in Function:     Gait WNL.   Reports walking track at school 3 laps with good tolerance. []  See Progress Note/Recertification   Patient will continue to benefit from skilled PT services to modify and progress therapeutic interventions, address functional mobility deficits, address ROM deficits, address strength deficits, analyze and address soft tissue restrictions and analyze and cue movement patterns to attain remaining goals. Progress toward goals / Updated goals:    New Goals   1.  Improve left knee AAROM 0 - 115 to assist with ADLs, dressing, self care: progressing well 9/23/2020   2.  Improve FOTO Functional Status Score by 22 points in order to show significant functional improvement   3.  Pt will be able to amb without deviation 150 ft community distances for safety in community.           PLAN  []  Upgrade activities as tolerated yes Continue plan of care   []  Discharge due to :    []  Other:      Therapist: Yomi Fernandez PTA    Date: 9/28/2020 Time: 6:21 AM     Future Appointments   Date Time Provider Jose Lucero   9/28/2020  9:15 AM [facility] Flores [address]   9/30/2020 10:00 AM Mary Melendez [facility] [address]   10/1/2020  9:45 AM [facility] RAD US RM 2 DMCUS [facility]   10/2/2020 10:00 AM LAURY [facility] [address]   10/15/2020 11:00 AM Daniel Goodwin PA-C [facility] BS AMB

## 2020-09-30 ENCOUNTER — HOSPITAL ENCOUNTER (OUTPATIENT)
Dept: PHYSICAL THERAPY | Age: 60
End: 2020-09-30
Payer: OTHER GOVERNMENT

## 2020-10-01 ENCOUNTER — HOSPITAL ENCOUNTER (OUTPATIENT)
Dept: ULTRASOUND IMAGING | Age: 60
Discharge: HOME | End: 2020-10-01
Attending: PHYSICIAN ASSISTANT
Payer: OTHER GOVERNMENT

## 2020-10-01 DIAGNOSIS — M25.562 LEFT KNEE PAIN, UNSPECIFIED CHRONICITY: ICD-10-CM

## 2020-10-01 PROCEDURE — 76882 US LMTD JT/FCL EVL NVASC XTR: CPT

## 2020-10-02 ENCOUNTER — HOSPITAL ENCOUNTER (OUTPATIENT)
Dept: PHYSICAL THERAPY | Age: 60
Discharge: HOME | End: 2020-10-02
Payer: OTHER GOVERNMENT

## 2020-10-02 PROCEDURE — 97140 MANUAL THERAPY 1/> REGIONS: CPT

## 2020-10-02 PROCEDURE — 97110 THERAPEUTIC EXERCISES: CPT

## 2020-10-02 NOTE — PROGRESS NOTES
PHYSICAL THERAPY - DAILY TREATMENT NOTE    Patient Name: Teddy Gamboa        Date: 10/2/2020  : 1960   yes Patient  Verified  Visit #:   15   of   20  Insurance: Payor: DEENA / Plan: Phillip Farias 74 / Product Type:  /      In time: 1005 Out time: 4128   Total Treatment Time: 40     Medicare/BCBS Time Tracking (below)   Total Timed Codes (min):  na 1:1 Treatment Time:  na     TREATMENT AREA =  Pain in left knee [M25.562]    SUBJECTIVE  Pain Level (on 0 to 10 scale):  0  / 10   Medication Changes/New allergies or changes in medical history, any new surgeries or procedures?    no  If yes, update Summary List   Subjective Functional Status/Changes:  []  No changes reported     I'm really not having any pain. And that one spot on the outside of my knee         OBJECTIVE    25 min Therapeutic Exercise:  [x]  See flow sheet   Rationale:      increase ROM, increase strength, improve coordination and improve balance to improve the patients ability to  perform functional mobility activities with decrease c/o symptoms.          15 min Manual Therapy: Pat mob, AAROM knee flex/ext, HS stretch, STM VMO with knee flexed. Retrograde massage, prone fib mob. Prone translation mob for knee flexion   Rationale:      decrease pain, increase ROM and increase tissue extensibility to improve patient's ability to  perform functional mobility activities with decrease c/o symptoms.         Billed With/As:   [x] TE   [] TA   [] Neuro   [] Self Care Patient Education: [x] Review HEP    [] Progressed/Changed HEP based on:   [] positioning   [] body mechanics   [] transfers   [] heat/ice application    [] other:      Other Objective/Functional Measures:    PROM 0 - 122     Post Treatment Pain Level (on 0 to 10) scale:   0  / 10     ASSESSMENT  Assessment/Changes in Function:     Patient demonstrating decrease apprehension with descending stair with reciprocal pattern.    Patient demonstrating increase weight bearing through (L) Le when performing squats. Gait improving steadily toward WNL. []  See Progress Note/Recertification   Patient will continue to benefit from skilled PT services to modify and progress therapeutic interventions, address functional mobility deficits, address ROM deficits, address strength deficits, analyze and address soft tissue restrictions and analyze and cue movement patterns to attain remaining goals. Progress toward goals / Updated goals:    New Goals   1.  Improve left knee AAROM 0 - 115 to assist with ADLs, dressing, self care: progressing well 9/23/2020   2.  Improve FOTO Functional Status Score by 22 points in order to show significant functional improvement   3.  Pt will be able to amb without deviation 150 ft community distances for safety in community.           PLAN  []  Upgrade activities as tolerated yes Continue plan of care   []  Discharge due to :    []  Other:      Therapist: Kadie Brown PTA    Date: 10/2/2020 Time: 6:23 AM     Future Appointments   Date Time Provider Jose Lucero   10/2/2020 10:00 AM [facility]   10/15/2020 11:00 AM Jeison Morales PA-C [facility] BS AMB

## 2020-10-06 ENCOUNTER — HOSPITAL ENCOUNTER (OUTPATIENT)
Dept: PHYSICAL THERAPY | Age: 60
Discharge: HOME | End: 2020-10-06
Payer: OTHER GOVERNMENT

## 2020-10-06 PROCEDURE — 97110 THERAPEUTIC EXERCISES: CPT

## 2020-10-06 PROCEDURE — 97140 MANUAL THERAPY 1/> REGIONS: CPT

## 2020-10-06 NOTE — PROGRESS NOTES
PHYSICAL THERAPY - DAILY TREATMENT NOTE    Patient Name: Caprice Sweet        Date: 10/6/2020  : 1960   yes Patient  Verified  Visit #:      20  Insurance: Payor: DEENA / Plan: Phillip Farias 74 / Product Type:  /      In time: 800 Out time: 925   Total Treatment Time: 55     Medicare/BCBS Time Tracking (below)   Total Timed Codes (min):  na 1:1 Treatment Time:  na     TREATMENT AREA =  Pain in left knee [M25.562]    SUBJECTIVE  Pain Level (on 0 to 10 scale):  0  / 10   Medication Changes/New allergies or changes in medical history, any new surgeries or procedures?    no  If yes, update Summary List   Subjective Functional Status/Changes:  []  No changes reported     Just really stiff today, did a lot of yard work. OBJECTIVE    30 min Therapeutic Exercise:  [x]  See flow sheet   Rationale:      increase ROM, increase strength, improve coordination and improve balance to improve the patients ability to perform functional mobility activities with decrease c/o symptoms.        25 min Manual Therapy: Pat mob, AAROM knee flex/ext, HS stretch, STM VMO with knee flexed. Retrograde massage, prone fib mob. Prone translation mob for knee flexion   Rationale:      decrease pain, increase ROM and increase tissue extensibility to improve patient's ability to perform functional mobility activities with decrease c/o symptoms.       Billed With/As:   [x] TE   [] TA   [] Neuro   [] Self Care Patient Education: [x] Review HEP    [] Progressed/Changed HEP based on:   [] positioning   [] body mechanics   [] transfers   [] heat/ice application    [] other:      Other Objective/Functional Measures:    Patient presenting with decrease full knee extension while ambulating today. Performed prone knee extension hang with 5# weight to assist with extension while performing manual.  Patient was able to achieve full knee extension. Patient with increase TTP along HS tendon and quad tendon. Patient also with decrease knee flexion and difficulty with PROM secondary to stiffness. These limitations could be from overactivity this past weekend. Post Treatment Pain Level (on 0 to 10) scale:   0  / 10     ASSESSMENT  Assessment/Changes in Function:     Good tolerance to all therx and manual with reports of decrease stiffness after treatment session. Patient able to achieve full knee extensio while ambulating on treadmill. []  See Progress Note/Recertification   Patient will continue to benefit from skilled PT services to modify and progress therapeutic interventions, address functional mobility deficits, address ROM deficits, address strength deficits, analyze and address soft tissue restrictions and analyze and cue movement patterns to attain remaining goals. Progress toward goals / Updated goals:    New Goals   1.  Improve left knee AAROM 0 - 115 to assist with ADLs, dressing, self care: progressing well 9/23/2020   2.  Improve FOTO Functional Status Score by 22 points in order to show significant functional improvement   3.  Pt will be able to amb without deviation 150 ft community distances for safety in community.           PLAN  []  Upgrade activities as tolerated yes Continue plan of care   []  Discharge due to :    []  Other:      Therapist: Chiqui Matamoros PTA    Date: 10/6/2020 Time: 7:13 AM     Future Appointments   Date Time Provider Jose Lucero   10/6/2020  8:30 AM Mini LAURY Judge [facility]   10/8/2020  8:30 AM Minimary lou Judge PTA [facility]   10/13/2020  8:15 AM Mini Alfie, LAURY [facility]   10/15/2020  8:30 AM Mini Chock, PTA [facility]   10/15/2020 11:00 AM Rossy Samuel PA-C [facility] BS AMB   10/20/2020  8:30 AM Mini Chozach, PTA [facility]   10/22/2020  8:30 AM Mini Alfie, PTA [facility]   10/27/2020  8:15 AM Mini Chozach, PTA [facility]   10/29/2020  8:30 AM Mini Alfie, PTA [facility] [address]

## 2020-10-08 ENCOUNTER — HOSPITAL ENCOUNTER (OUTPATIENT)
Dept: PHYSICAL THERAPY | Age: 60
Discharge: HOME | End: 2020-10-08
Payer: OTHER GOVERNMENT

## 2020-10-08 PROCEDURE — 97140 MANUAL THERAPY 1/> REGIONS: CPT

## 2020-10-08 PROCEDURE — 97110 THERAPEUTIC EXERCISES: CPT

## 2020-10-08 NOTE — PROGRESS NOTES
PHYSICAL THERAPY - DAILY TREATMENT NOTE    Patient Name: Anthony Bautista        Date: 10/8/2020  : 1960   yes Patient  Verified  Visit #:     Insurance: Payor: DEENA / Plan: Phillip Farias 74 / Product Type:  /      In time: 662 Out time: 920   Total Treatment Time: 50     Medicare/BCBS Time Tracking (below)   Total Timed Codes (min):  na 1:1 Treatment Time:  na     TREATMENT AREA =  Pain in left knee [M25.562]    SUBJECTIVE  Pain Level (on 0 to 10 scale):  0  / 10   Medication Changes/New allergies or changes in medical history, any new surgeries or procedures?    no  If yes, update Summary List   Subjective Functional Status/Changes:  []  No changes reported     The doctor said I have a pulled HS. He also sent me for an US because of the swelling in my leg, but I don't have a DVT. OBJECTIVE    35 min Therapeutic Exercise:  [x]  See flow sheet   Rationale:      increase ROM, increase strength, improve coordination and improve balance to improve the patients ability to perform functional mobility activities with decrease c/o symptoms.         15 min Manual Therapy: Pat mob, AAROM knee flex/ext, HS stretch, STM VMO with knee flexed. Retrograde massage, prone fib mob. Prone translation mob for knee flexion   Rationale:      decrease pain, increase ROM and increase tissue extensibility to improve patient's ability to perform functional mobility activities with decrease c/o symptoms.          Billed With/As:   [x] TE   [] TA   [] Neuro   [] Self Care Patient Education: [x] Review HEP    [] Progressed/Changed HEP based on:   [] positioning   [] body mechanics   [] transfers   [] heat/ice application    [] other:      Other Objective/Functional Measures:    PROM L knee: 0 - 123.      Post Treatment Pain Level (on 0 to 10) scale:   0  / 10     ASSESSMENT  Assessment/Changes in Function:     Progressing steadily with descending stairs with reciprocal pattern, uses (B) handrails to assist with weightbearing while bringing (R) LE down first.     []  See Progress Note/Recertification   Patient will continue to benefit from skilled PT services to modify and progress therapeutic interventions, address functional mobility deficits, address ROM deficits, address strength deficits, analyze and address soft tissue restrictions and analyze and cue movement patterns to attain remaining goals. Progress toward goals / Updated goals:    New Goals   1.  Improve left knee AAROM 0 - 115 to assist with ADLs, dressing, self care: progressing well 9/23/2020   2.  Improve FOTO Functional Status Score by 22 points in order to show significant functional improvement   3.  Pt will be able to amb without deviation 150 ft community distances for safety in community.           PLAN  []  Upgrade activities as tolerated yes Continue plan of care   []  Discharge due to :    []  Other:      Therapist: Reina Lagos PTA    Date: 10/8/2020 Time: 7:11 AM     Future Appointments   Date Time Provider Jose Lucero   10/8/2020  8:30 AM Elliott Rochelle, PTA [facility]   10/13/2020  8:15 AM Elliott Rochelle, PTA [facility]   10/15/2020  8:30 AM Elliott Rochelle, PTA [facility]   10/15/2020 11:00 AM Yunier Manley PA-C [facility] BS AMB   10/20/2020  8:30 AM Elliott Rochelle,  [address] [facility]   10/22/2020  8:30 AM Elliott Rochelle,  [address] [facility]   10/27/2020  8:15 AM Elliott Rochelle,  [address] [facility]   10/29/2020  8:30 AM Ileana Hernández, PTA [facility]

## 2020-10-13 ENCOUNTER — HOSPITAL ENCOUNTER (OUTPATIENT)
Dept: PHYSICAL THERAPY | Age: 60
Discharge: HOME | End: 2020-10-13
Payer: OTHER GOVERNMENT

## 2020-10-13 PROCEDURE — 97140 MANUAL THERAPY 1/> REGIONS: CPT

## 2020-10-13 PROCEDURE — 97110 THERAPEUTIC EXERCISES: CPT

## 2020-10-13 NOTE — PROGRESS NOTES
PHYSICAL THERAPY - DAILY TREATMENT NOTE    Patient Name: Ema Meyers        Date: 10/13/2020  : 1960   yes Patient  Verified  Visit #:     Insurance: Payor: DEENA / Plan: Phillip Farias 74 / Product Type:  /      In time: 815 Out time: 530   Total Treatment Time: 60     Medicare/BCBS Time Tracking (below)   Total Timed Codes (min):  na 1:1 Treatment Time:  na     TREATMENT AREA =  Pain in left knee [M25.562]    SUBJECTIVE  Pain Level (on 0 to 10 scale):  0  / 10   Medication Changes/New allergies or changes in medical history, any new surgeries or procedures?    no  If yes, update Summary List   Subjective Functional Status/Changes:  []  No changes reported     [facility] didn't realize how weak I have gotten. But it was good to go and little intimidating. OBJECTIVE    35 min Therapeutic Exercise:  [x]  See flow sheet   Rationale:      increase ROM, increase strength, improve coordination and improve balance to improve the patients ability to perform functional mobility activities with decrease c/o symptoms.          25 min Manual Therapy: Pat mob, AAROM knee flex/ext, HS stretch, STM VMO with knee flexed. Retrograde massage, prone fib mob. Prone translation mob for knee flexion   Rationale:      decrease pain, increase ROM and increase tissue extensibility to improve patient's ability to perform functional mobility activities with decrease c/o symptoms.         Billed With/As:   [x] TE   [] TA   [] Neuro   [] Self Care Patient Education: [x] Review HEP    [] Progressed/Changed HEP based on:   [] positioning   [] body mechanics   [] transfers   [] heat/ice application    [] other:      Other Objective/Functional Measures:    PROM 0 - 123  ARM 0 - 118     Post Treatment Pain Level (on 0 to 10) scale:   0  / 10     ASSESSMENT  Assessment/Changes in Function:     Slight increase of muscle tightness with knee flexion.   Patient advised to increase her stretching routine if she continues to increase her activity levels and going to the gym     []  See Progress Note/Recertification   Patient will continue to benefit from skilled PT services to modify and progress therapeutic interventions, address functional mobility deficits, address ROM deficits, address strength deficits, analyze and address soft tissue restrictions and analyze and cue movement patterns to attain remaining goals. Progress toward goals / Updated goals:    New Goals   1.  Improve left knee AAROM 0 - 115 to assist with ADLs, dressing, self care: Achieved 10/13/2020   2.  Improve FOTO Functional Status Score by 22 points in order to show significant functional improvement   3.  Pt will be able to amb without deviation 150 ft community distances for safety in community.           PLAN  []  Upgrade activities as tolerated yes Continue plan of care   []  Discharge due to :    []  Other:      Therapist: Alonzo Harrell PTA    Date: 10/13/2020 Time: 6:58 AM     Future Appointments   Date Time Provider Jose Lucero   10/13/2020  8:15 AM LAURY [facility]   10/15/2020  8:30 AM LAURY [facility] [address]   10/15/2020 11:00 AM John Benítez PA-C [facility] BS AMB   10/20/2020  8:30 AM Federico Decker PTA [facility]   10/22/2020  8:30 AM Federico Decker PTA [facility]   10/27/2020  8:15 AM Federico Decker PTA [facility]   10/29/2020  8:30 AM LAURY [address] No

## 2020-10-15 ENCOUNTER — OFFICE VISIT (OUTPATIENT)
Dept: ORTHOPEDIC SURGERY | Age: 60
End: 2020-10-15
Payer: OTHER GOVERNMENT

## 2020-10-15 ENCOUNTER — HOSPITAL ENCOUNTER (OUTPATIENT)
Dept: PHYSICAL THERAPY | Age: 60
Discharge: HOME | End: 2020-10-15
Payer: OTHER GOVERNMENT

## 2020-10-15 VITALS — TEMPERATURE: 96.5 F | WEIGHT: 197 LBS | BODY MASS INDEX: 30.85 KG/M2

## 2020-10-15 DIAGNOSIS — M25.562 LEFT KNEE PAIN, UNSPECIFIED CHRONICITY: ICD-10-CM

## 2020-10-15 DIAGNOSIS — Z96.652 STATUS POST LEFT KNEE REPLACEMENT: Primary | ICD-10-CM

## 2020-10-15 PROCEDURE — 97110 THERAPEUTIC EXERCISES: CPT

## 2020-10-15 PROCEDURE — 99024 POSTOP FOLLOW-UP VISIT: CPT | Performed by: PHYSICIAN ASSISTANT

## 2020-10-15 PROCEDURE — 97140 MANUAL THERAPY 1/> REGIONS: CPT

## 2020-10-15 RX ORDER — AMOXICILLIN 500 MG/1
CAPSULE ORAL
Qty: 4 CAP | Refills: 3 | Status: SHIPPED | OUTPATIENT
Start: 2020-10-15

## 2020-10-15 NOTE — PROGRESS NOTES
[address] [place] [address]  [phone]           Patient: June Singleton                MRN: [id number]       SSN: [id number]  YOB: 1960        AGE: 62 y.o. SEX: female  Body mass index is 30.85 kg/m². PCP: Joyce Penn MD  10/15/20      This office note has been dictated. REVIEW OF SYSTEMS:  Constitutional: Negative for fever, chills, weight loss and malaise/fatigue. HENT: Negative. Eyes: Negative. Respiratory: Negative. Cardiovascular: Negative. Gastrointestinal: No bowel incontinence or constipation. Genitourinary: No bladder incontinence or saddle anesthesia. Skin: Negative. Neurological: Negative. Endo/Heme/Allergies: Negative. Psychiatric/Behavioral: Negative. Musculoskeletal: As per HPI above. Past Medical History:   Diagnosis Date    Diabetes [facility])          Current Outpatient Medications:     diclofenac (VOLTAREN) 1 % gel, Apply 4 g to affected area four (4) times daily. , Disp: 100 g, Rfl: 4    rosuvastatin (CRESTOR) 10 mg tablet, Take 10 mg by mouth nightly., Disp: , Rfl:     multivit-min/ferrous fumarate (MULTI VITAMIN PO), Take 1 Tab by mouth daily. , Disp: , Rfl:     celecoxib (CeleBREX) 200 mg capsule, Take 1 Cap by mouth two (2) times a day for 90 days. , Disp: 60 Cap, Rfl: 2    empagliflozin (JARDIANCE PO), Take 25 mg by mouth daily. , Disp: , Rfl:     metFORMIN (GLUCOPHAGE) 1,000 mg tablet, Take 1,000 mg by mouth two (2) times daily (with meals). , Disp: , Rfl:     dulaglutide (TRULICITY) 1.5 VT/8.1 mL sub-q pen, 1.5 mg by SubCUTAneous route every seven (7) days. , Disp: , Rfl:     ergocalciferol (VITAMIN D2) 50,000 unit capsule, Take 50,000 Units by mouth., Disp: , Rfl:     aspirin (ASPIRIN) 325 mg tablet, Take 1 Tab by mouth two (2) times a day., Disp: 60 Tab, Rfl: 0    docusate sodium (Colace) 100 mg capsule, Take 1 Cap by mouth two (2) times a day for 90 days., Disp: 60 Cap, Rfl: 2    cephALEXin (Keflex) 500 mg capsule, Take 1 Cap by mouth four (4) times daily. , Disp: 8 Cap, Rfl: 0    No Known Allergies    Social History     Socioeconomic History    Marital status: LEGALLY      Spouse name: Not on file    Number of children: Not on file    Years of education: Not on file    Highest education level: Not on file   Occupational History    Not on file   Social Needs    Financial resource strain: Not on file    Food insecurity     Worry: Not on file     Inability: Not on file    Transportation needs     Medical: Not on file     Non-medical: Not on file   Tobacco Use    Smoking status: Former Smoker    Smokeless tobacco: Never Used   Substance and Sexual Activity    Alcohol use: Not Currently    Drug use: Never    Sexual activity: Not on file   Lifestyle    Physical activity     Days per week: Not on file     Minutes per session: Not on file    Stress: Not on file   Relationships    Social connections     Talks on phone: Not on file     Gets together: Not on file     Attends Taoist service: Not on file     Active member of club or organization: Not on file     Attends meetings of clubs or organizations: Not on file     Relationship status: Not on file    Intimate partner violence     Fear of current or ex partner: Not on file     Emotionally abused: Not on file     Physically abused: Not on file     Forced sexual activity: Not on file   Other Topics Concern    Not on file   Social History Narrative    Not on file       Past Surgical History:   Procedure Laterality Date    HX GYN      c section    HX KNEE ARTHROSCOPY Right     ankle, fx     HX ORTHOPAEDIC Bilateral     minicus repair            Patient seen evaluated today for her left knee replacement. She is now nearly 3 months status post knee replacement surgery. She has been doing well. Her posterior knee pain is improving. I had sent her for an ultrasound of her hamstrings.   She presents today for reevaluation. She does continue to there without complications. She had no troubles with wound. Pain is well controlled. She is improving. Patient denies recent fevers, chills, chest pain, SOB, or injuries. No recent systemic changes noted. A 12-point review of systems is performed today. Pertinent positives are noted. All other systems reviewed and otherwise are negative. Physical exam: General: Alert and oriented x3, nad.  well-developed, well nourished. normal affect, AF. NC/AT, EOMI, neck supple, trachea midline, no JVD present. Breathing is non-labored. Examination of the lower extremities reveals pain-free range of the hips with no pain to palpation of the bursa. No straight leg raise. Calf tenderness. Negative Homans. No evidence for cellulitis. .  The left knee reveals skin intact. The surgical wounds healed nicely. She has negative posterior knee tenderness. She has full range of motion. Excellent stability. Review of the ultrasound suggested grade 1 injury strain without tear to the biceps femoris musculotendinous junction. Assessment: Status post left knee replacement    Plan: At this point, she will continue on fish of her physical therapy. She is doing well. We will see her back in 3 months time for evaluation. She will call with any questions or concerns that should arise.             JR Mc SMITH, NILESH, ATC

## 2020-10-15 NOTE — PROGRESS NOTES
PHYSICAL THERAPY - DAILY TREATMENT NOTE    Patient Name: Dory Gracia        Date: 10/15/2020  : 1960   yes Patient  Verified  Visit #:     Insurance: Payor: DEENA / Plan: Juliette Hernandez / Product Type:  /      In time: 840 Out time: 940   Total Treatment Time: 60     Medicare/[facility] Time Tracking (below)   Total Timed Codes (min):  na 1:1 Treatment Time:  na     TREATMENT AREA =  Pain in left knee [M25.562]    SUBJECTIVE  Pain Level (on 0 to 10 scale):  0  / 10   Medication Changes/New allergies or changes in medical history, any new surgeries or procedures?    no  If yes, update Summary List   Subjective Functional Status/Changes:  []  No changes reported     No new c/o          OBJECTIVE    45 min Therapeutic Exercise:  [x]  See flow sheet   Rationale:      increase ROM, increase strength, improve coordination and improve balance to improve the patients ability to perform functional mobility activities with decrease c/o symptoms.        15 min Manual Therapy: Pat mob, AAROM knee flex/ext, HS stretch, STM VMO with knee flexed. Retrograde massage, prone fib mob. Prone translation mob for knee flexion   Rationale:      decrease pain, increase ROM and increase tissue extensibility to improve patient's ability to perform functional mobility activities with decrease c/o symptoms.         Billed With/As:   [x] TE   [] TA   [] Neuro   [] Self Care Patient Education: [x] Review HEP    [] Progressed/Changed HEP based on:   [] positioning   [] body mechanics   [] transfers   [] heat/ice application    [] other:      Other Objective/Functional Measures:    Overall good mobility of knee with PROM and AROM,  Patient is able to negotiate one flight of stairs with (B) handrails and reciprocal pattern.      Post Treatment Pain Level (on 0 to 10) scale:   o  / 10     ASSESSMENT  Assessment/Changes in Function:     Increase anterior knee pain with descending stairs, add step off incline board 15 x 5 seconds leading with R to improved knee function and strengthen quad eccentrically. []  See Progress Note/Recertification   Patient will continue to benefit from skilled PT services to modify and progress therapeutic interventions, address functional mobility deficits, address ROM deficits, address strength deficits, analyze and address soft tissue restrictions and analyze and cue movement patterns to attain remaining goals. Progress toward goals / Updated goals:    New Goals   1.  Improve left knee AAROM 0 - 115 to assist with ADLs, dressing, self care: Achieved 10/13/2020   2.  Improve FOTO Functional Status Score by 22 points in order to show significant functional improvement   3.  Pt will be able to amb without deviation 150 ft community distances for safety in community.             PLAN  []  Upgrade activities as tolerated yes Continue plan of care   []  Discharge due to :    []  Other:      Therapist: Abimael Brooks PTA    Date: 10/15/2020 Time: 6:57 AM     Future Appointments   Date Time Provider oJse Lucero   10/15/2020  8:30 AM LAURY [address]   10/15/2020 11:00 AM Efrem Lewis PA-C [facility] BS AMB   10/20/2020  8:30 AM Rishabh Ibarra PTA [facility]   10/22/2020  8:30 AM Rishabh Ibarra PTA [facility]   10/27/2020  8:15 AM Rishabh Ibarra PTA [facility]   10/29/2020  8:30 AM LAURY [address]

## 2020-10-15 NOTE — LETTER
NOTIFICATION FOR THE CA 
 
10/15/2020 11:17 AM 
 
Ms. Manolo Medrano [place] [id number] [address] [facility] [id number] To Whom It May Concern: 
 
Manolo Medrano is currently under the care of [address] [place] [address]. She is allowed to participate in [address] [facility] training activties at the [facility]. If there are questions or concerns please have the patient contact our office.  
 
 
 
Sincerely, 
 
 
Berna Kwan PA-C

## 2020-10-20 ENCOUNTER — HOSPITAL ENCOUNTER (OUTPATIENT)
Dept: PHYSICAL THERAPY | Age: 60
Discharge: HOME | End: 2020-10-20
Payer: OTHER GOVERNMENT

## 2020-10-20 PROCEDURE — 97140 MANUAL THERAPY 1/> REGIONS: CPT

## 2020-10-20 PROCEDURE — 97110 THERAPEUTIC EXERCISES: CPT

## 2020-10-20 NOTE — PROGRESS NOTES
PHYSICAL THERAPY - DAILY TREATMENT NOTE    Patient Name: Dory Gracia        Date: 10/20/2020  : 1960   yes Patient  Verified  Visit #:     Insurance: Payor: DEENA / Plan: Juliette Hernandez / Product Type:  /      In time: 233 Out time: 930   Total Treatment Time: 60     Medicare/[facility] Time Tracking (below)   Total Timed Codes (min):  na 1:1 Treatment Time:  na     TREATMENT AREA =  Pain in left knee [M25.562]    SUBJECTIVE  Pain Level (on 0 to 10 scale):  0  / 10   Medication Changes/New allergies or changes in medical history, any new surgeries or procedures?    no  If yes, update Summary List   Subjective Functional Status/Changes:  []  No changes reported     I walked up the stairs today and it hurt. Went to [employer] and walked around there yesterday and my knee was fine. OBJECTIVE    45 min Therapeutic Exercise:  [x]  See flow sheet   Rationale:      increase ROM, increase strength, improve coordination and improve balance to improve the patients ability to perform functional mobility activities with decrease c/o symptoms.       15 min Manual Therapy: Pat mob, AAROM knee flex/ext, HS stretch, STM VMO with knee flexed. Retrograde massage, prone fib mob. Prone translation mob for knee flexion   Rationale:      decrease pain, increase ROM and increase tissue extensibility to improve patient's ability to perform functional mobility activities with decrease c/o symptoms.     Billed With/As:   [x] TE   [] TA   [] Neuro   [] Self Care Patient Education: [x] Review HEP    [] Progressed/Changed HEP based on:   [] positioning   [] body mechanics   [] transfers   [] heat/ice application    [] other:      Other Objective/Functional Measures:    PAtient is challenged with eccentric control to (L) quad while performing reciprocal pattern descending stairs.      Post Treatment Pain Level (on 0 to 10) scale:   0  / 10     ASSESSMENT  Assessment/Changes in Function: Patient negotiated 1 flight of stairs with reciprocal pattern and (B) handrails. []  See Progress Note/Recertification   Patient will continue to benefit from skilled PT services to modify and progress therapeutic interventions, address functional mobility deficits, address ROM deficits, address strength deficits, analyze and address soft tissue restrictions and analyze and cue movement patterns to attain remaining goals. Progress toward goals / Updated goals:    New Goals   1.  Improve left knee AAROM 0 - 115 to assist with ADLs, dressing, self care: Achieved 10/13/2020   2.  Improve FOTO Functional Status Score by 22 points in order to show significant functional improvement   3.  Pt will be able to amb without deviation 150 ft community distances for safety in community.  Achieved 10/20/2020          PLAN  []  Upgrade activities as tolerated yes Continue plan of care   []  Discharge due to :    []  Other:      Therapist: Reina Lagos PTA    Date: 10/20/2020 Time: 7:05 AM     Future Appointments   Date Time Provider Jose Lucero   10/20/2020  8:30 AM Radha [id number]   10/22/2020  8:30 AM Elliott Byrd PTA [address]   10/27/2020  8:15 AM Elliott Byrd PTA [address]   1/21/2021  9:20 AM Yunier Manley PA-C [facility] BS AMB

## 2020-10-22 ENCOUNTER — HOSPITAL ENCOUNTER (OUTPATIENT)
Dept: PHYSICAL THERAPY | Age: 60
End: 2020-10-22
Payer: OTHER GOVERNMENT

## 2020-10-27 ENCOUNTER — HOSPITAL ENCOUNTER (OUTPATIENT)
Dept: PHYSICAL THERAPY | Age: 60
Discharge: HOME | End: 2020-10-27
Payer: OTHER GOVERNMENT

## 2020-10-27 DIAGNOSIS — M17.12 ARTHRITIS OF LEFT KNEE: Primary | ICD-10-CM

## 2020-10-27 PROCEDURE — 97110 THERAPEUTIC EXERCISES: CPT

## 2020-10-27 PROCEDURE — 97140 MANUAL THERAPY 1/> REGIONS: CPT

## 2020-10-27 RX ORDER — DOCUSATE SODIUM 100 MG/1
100 CAPSULE, LIQUID FILLED ORAL 2 TIMES DAILY
Qty: 60 CAP | Refills: 2 | Status: SHIPPED | OUTPATIENT
Start: 2020-10-27 | End: 2021-01-25

## 2020-10-27 RX ORDER — CELECOXIB 200 MG/1
200 CAPSULE ORAL 2 TIMES DAILY
Qty: 60 CAP | Refills: 2 | Status: SHIPPED | OUTPATIENT
Start: 2020-10-27 | End: 2021-01-25

## 2020-10-27 NOTE — PROGRESS NOTES
PHYSICAL THERAPY - DAILY TREATMENT NOTE    Patient Name: Camryn Born        Date: 10/27/2020  : 1960   yes Patient  Verified  Visit #:     Insurance: Payor:  / Plan: [facility] / Product Type:  /      In time: 820 Out time: 102   Total Treatment Time: 45     Medicare/[facility] Time Tracking (below)   Total Timed Codes (min):  na 1:1 Treatment Time:  na     TREATMENT AREA =  Pain in left knee [M25.562]    SUBJECTIVE  Pain Level (on 0 to 10 scale): 0  / 10   Medication Changes/New allergies or changes in medical history, any new surgeries or procedures?    no  If yes, update Summary List   Subjective Functional Status/Changes:  []  No changes reported     Just the morning sitffness. OBJECTIVE    30 min Therapeutic Exercise:  [x]  See flow sheet   Rationale:      increase ROM, increase strength, improve coordination and improve balance to improve the patients ability to perform functional mobility activities with decrease c/o symptoms.        15 min Manual Therapy: Pat mob, AAROM knee flex/ext, HS stretch, STM VMO with knee flexed. Rationale:      decrease pain, increase ROM and increase tissue extensibility to improve patient's ability to perform functional mobility activities with decrease c/o symptoms.      Billed With/As:   [x] TE   [] TA   [] Neuro   [] Self Care Patient Education: [x] Review HEP    [] Progressed/Changed HEP based on:   [] positioning   [] body mechanics   [] transfers   [] heat/ice application    [] other:      Other Objective/Functional Measures:    FOTO 69  GROC 7  AROM: 0 - 121  Strength: 5/5     Post Treatment Pain Level (on 0 to 10) scale:  0 / 10     ASSESSMENT  Assessment/Changes in Function:     Patient reports ~  100 % overall improvement with performance of general ADLs and functional mobility activities.      []  See Progress Note/Recertification   Patient will continue to benefit from skilled PT services to modify and progress therapeutic interventions, address functional mobility deficits, address ROM deficits, address strength deficits, analyze and address soft tissue restrictions and analyze and cue movement patterns to attain remaining goals. Progress toward goals / Updated goals:    New Goals   1.  Improve left knee AAROM 0 - 115 to assist with ADLs, dressing, self care: Achieved 10/13/2020   2.  Improve FOTO Functional Status Score by 22 points in order to show significant functional improvement: Achieved: 34 point improvement. 3.  Pt will be able to amb without deviation 150 ft community distances for safety in community.  Achieved 10/20/2020          PLAN  []  Upgrade activities as tolerated yes Continue plan of care   []  Discharge due to :    []  Other:      Therapist: Evy Chavez PTA    Date: 10/27/2020 Time: 7:30 AM     Future Appointments   Date Time Provider Jose Lucero   10/27/2020  8:15 AM Renetta Velasquez PTA [id number]   1/21/2021  9:20 AM Blaire Georges PA-C [facility] BS AMB

## 2020-10-27 NOTE — TELEPHONE ENCOUNTER
Last Visit: 10/15/20 with AG Mercado  Next Appointment: 1/21/21 with AG Mercado  Previous Refill Encounter(s): 7/27/20 #60 with 2 refills    Requested Prescriptions     Pending Prescriptions Disp Refills    docusate sodium (COLACE) 100 mg capsule 60 Cap 2     Sig: Take 1 Cap by mouth two (2) times a day for 90 days.  celecoxib (CeleBREX) 200 mg capsule 60 Cap 2     Sig: Take 1 Cap by mouth two (2) times a day for 90 days.

## 2020-10-29 ENCOUNTER — APPOINTMENT (OUTPATIENT)
Dept: PHYSICAL THERAPY | Age: 60
End: 2020-10-29
Payer: OTHER GOVERNMENT

## 2021-01-01 NOTE — PROGRESS NOTES
conducted an initial consultation and Spiritual Assessment for Zuleima Rosa, who is a 63 y.o.,female. Patient's Primary Language is: [place]. According to the patient's EMR Mormon Affiliation is: Yazdanism. The reason the Patient came to the hospital is:   Patient Active Problem List    Diagnosis Date Noted    Arthritis of knee 07/27/2020    Sacroiliitis, not elsewhere classified ([facility] [address] 03/12/2020    [facility]        The  provided the following Interventions:  Initiated a relationship of care and support. Explored issues of irena, belief, spirituality and Orthodox/ritual needs while hospitalized. Listened empathically. Provided chaplaincy education. Provided information about Spiritual Care Services. Offered assurance of continued prayers on patient's behalf. Chart reviewed. The following outcomes where achieved:  Patient shared limited information about both their medical narrative and spiritual journey/beliefs. Patient processed feeling about current hospitalization. Patient expressed gratitude for 's visit. Assessment:  Patient does not have any Orthodox/cultural needs that will affect patient's preferences in health care. There are no spiritual or Orthodox issues which require intervention at this time. Plan:  Chaplains will continue to follow and will provide pastoral care on an as needed/requested basis.  recommends bedside caregivers page  on duty if patient shows signs of acute spiritual or emotional distress. Chaplain Kai BLANCHARD  [address] [facility]   [phone] Statement Selected

## 2021-01-14 NOTE — PROGRESS NOTES
[address], [facility] [address],[facility], [address] [facility] - Phone: [phone]  Fax: [phone] SUMMARY  Patient Name: Anna Nieves : 1960   Treatment/Medical Diagnosis: Pain in left knee [M25.562]     Referral Source: Lexx Monte MD     Date of Initial Visit: 2020 Attended Visits: 12 Missed Visits: Kirstie Perales has been seen at our clinic  for a total of 12 visits. Pt treatment has consisted of aerobic warm-up with stationary bike, therapeutic exercise for knee ROM, LE strengthening, hip/core strengthening, modalities prn and manual therapy (Pat mob, AAROM knee flex/ext, HS stretch, STM VMO with knee flexed.)    CURRENT STATUS  Pt has had a good tolerance to physical therapy treatment. Patient reports ~  100 % overall improvement with performance of general ADLs and functional mobility activities. GROC 7 a very great deal better, Strength: 5/5. Patient had returned to normal PLOF. Patient was able to ambulate community distances and negotiate 1 flight of stairs with reciprocal pattern, but is challenged with eccentric control to (L) quad while performing reciprocal pattern descending stairs. Patient was independent and compliant with HEP. Goal/Measure of Progress Goal Met? 1. Improve left knee AAROM 0 - 115 to assist with ADLs, dressing, self care   Status at last Eval: AROM knee flexion: 106, PROM knee flexion: 111  AAROM extension: 0 Current Status: AROM: 0 - 121 yes   2. Improve FOTO Functional Status Score by 22 points in order to show significant functional improvement   Status at last Eval: 54 Current Status: 69 Yes: 34 point improvement. 3.  Pt will be able to amb without deviation 150 ft community distances for safety in community   Status at last Eval: Patient is now able to ambulate community distances.   demonstrates WFL gait pattern: Current Status: Gait WNL yes RECOMMENDATIONS  Discharge from physical therapy treatment with HEP. Specifics: Program completed    If you have any questions/comments please contact us directly at [phone]. Thank you for allowing us to assist in the care of your patient.     LPTA Signature: Yenni Bhakta PTA Date: 1/14/2021   PT Signature:  Time: 9:03 AM

## 2021-01-21 ENCOUNTER — OFFICE VISIT (OUTPATIENT)
Dept: ORTHOPEDIC SURGERY | Age: 61
End: 2021-01-21
Payer: OTHER GOVERNMENT

## 2021-01-21 VITALS
SYSTOLIC BLOOD PRESSURE: 104 MMHG | OXYGEN SATURATION: 98 % | WEIGHT: 202 LBS | HEART RATE: 77 BPM | RESPIRATION RATE: 16 BRPM | TEMPERATURE: 97.3 F | DIASTOLIC BLOOD PRESSURE: 70 MMHG | BODY MASS INDEX: 31.71 KG/M2 | HEIGHT: 67 IN

## 2021-01-21 DIAGNOSIS — Z96.652 STATUS POST LEFT KNEE REPLACEMENT: Primary | ICD-10-CM

## 2021-01-21 DIAGNOSIS — M25.562 LEFT KNEE PAIN, UNSPECIFIED CHRONICITY: ICD-10-CM

## 2021-01-21 PROCEDURE — 99214 OFFICE O/P EST MOD 30 MIN: CPT | Performed by: PHYSICIAN ASSISTANT

## 2021-01-21 NOTE — PROGRESS NOTES
[address] [place] [address]  [phone]           Patient: Claudio Zamora                MRN: [id number]       SSN: [id number]  YOB: 1960        AGE: 62 y.o. SEX: female  Body mass index is 31.64 kg/m². PCP: Liana Osorio MD  01/21/21      This office note has been dictated. REVIEW OF SYSTEMS:  Constitutional: Negative for fever, chills, weight loss and malaise/fatigue. HENT: Negative. Eyes: Negative. Respiratory: Negative. Cardiovascular: Negative. Gastrointestinal: No bowel incontinence or constipation. Genitourinary: No bladder incontinence or saddle anesthesia. Skin: Negative. Neurological: Negative. Endo/Heme/Allergies: Negative. Psychiatric/Behavioral: Negative. Musculoskeletal: As per HPI above. Past Medical History:   Diagnosis Date    Diabetes [facility])          Current Outpatient Medications:     celecoxib (CeleBREX) 200 mg capsule, Take 1 Cap by mouth two (2) times a day for 90 days. , Disp: 60 Cap, Rfl: 2    diclofenac (VOLTAREN) 1 % gel, Apply 4 g to affected area four (4) times daily. , Disp: 100 g, Rfl: 4    rosuvastatin (CRESTOR) 10 mg tablet, Take 10 mg by mouth nightly., Disp: , Rfl:     multivit-min/ferrous fumarate (MULTI VITAMIN PO), Take 1 Tab by mouth daily. , Disp: , Rfl:     empagliflozin (JARDIANCE PO), Take 25 mg by mouth daily. , Disp: , Rfl:     metFORMIN (GLUCOPHAGE) 1,000 mg tablet, Take 1,000 mg by mouth two (2) times daily (with meals). , Disp: , Rfl:     dulaglutide (TRULICITY) 1.5 EC/6.6 mL sub-q pen, 1.5 mg by SubCUTAneous route every seven (7) days. , Disp: , Rfl:     docusate sodium (COLACE) 100 mg capsule, Take 1 Cap by mouth two (2) times a day for 90 days. , Disp: 60 Cap, Rfl: 2    amoxicillin (AMOXIL) 500 mg capsule, 4 PO 1 hour before dental appointment, Disp: 4 Cap, Rfl: 3    aspirin (ASPIRIN) 325 mg tablet, Take 1 Tab by mouth two (2) times a day., Disp: 60 Tab, Rfl: 0  •  cephALEXin (Keflex) 500 mg capsule, Take 1 Cap by mouth four (4) times daily., Disp: 8 Cap, Rfl: 0  •  ergocalciferol (VITAMIN D2) 50,000 unit capsule, Take 50,000 Units by mouth., Disp: , Rfl:     No Known Allergies    Social History     Socioeconomic History   • Marital status: LEGALLY      Spouse name: Not on file   • Number of children: Not on file   • Years of education: Not on file   • Highest education level: Not on file   Occupational History   • Not on file   Social Needs   • Financial resource strain: Not on file   • Food insecurity     Worry: Not on file     Inability: Not on file   • Transportation needs     Medical: Not on file     Non-medical: Not on file   Tobacco Use   • Smoking status: Former Smoker   • Smokeless tobacco: Never Used   Substance and Sexual Activity   • Alcohol use: Not Currently   • Drug use: Never   • Sexual activity: Not on file   Lifestyle   • Physical activity     Days per week: Not on file     Minutes per session: Not on file   • Stress: Not on file   Relationships   • Social connections     Talks on phone: Not on file     Gets together: Not on file     Attends Congregational service: Not on file     Active member of club or organization: Not on file     Attends meetings of clubs or organizations: Not on file     Relationship status: Not on file   • Intimate partner violence     Fear of current or ex partner: Not on file     Emotionally abused: Not on file     Physically abused: Not on file     Forced sexual activity: Not on file   Other Topics Concern   • Not on file   Social History Narrative   • Not on file       Past Surgical History:   Procedure Laterality Date   • HX GYN      c section   • HX KNEE ARTHROSCOPY Right     ankle, fx    • HX ORTHOPAEDIC Bilateral     minicus repair          Patient seen and evaluated today for her left knee replacement.  She is now approximate 6-month status post surgery.  Overall she is doing good with  exception of having some posterior knee pain with ambulation. She has been using Voltaren gel on this area which does not seem to help much. She has no feelings of instability. No giving way. No start up pain. She denies radiating pain down the lower extremity. Patient denies recent fevers, chills, chest pain, SOB, or injuries. No recent systemic changes noted. A 12-point review of systems is performed today. Pertinent positives are noted. All other systems reviewed and otherwise are negative. Physical exam: General: Alert and oriented x3, nad.  well-developed, well nourished. normal affect, AF. NC/AT, EOMI, neck supple, trachea midline, no JVD present. Breathing is non-labored. Examination of the lower extremities reveals pain-free range of motion hips. There is no pain to palpation trochanter bursa. Neck straight leg raise. Negative calf tenderness. Negative Homans. No signs of DVT present. Left knee reveals skin intact. There is no erythema, ecchymosis, warmth. There are no signs of infection or cellulitis present. Range of motion is full. Very good stability. Patella tracks nicely without rubs or crepitus noted. Does have a little bit of fullness in the popliteal space with some discomfort to palpation in this region. There is no tenderness palpation to the hamstring tendons. There is no discomfort with resisted knee flexion. Assessment: Status post left total knee replacement, left knee pain (popliteal cyst)    Plan: At this point, with her continued discomfort we will move forward with a [facility] MRI of the left knee. She will continue with the Voltaren gel. She will continue with ice therapy. We will see her back in the office after the MRI for further evaluation. She will call with any questions or concerns arise.             JR Mc SMITH, NILESH, ATC

## 2021-01-22 ENCOUNTER — TELEPHONE (OUTPATIENT)
Dept: ORTHOPEDIC SURGERY | Age: 61
End: 2021-01-22

## 2021-01-22 DIAGNOSIS — M25.562 LEFT KNEE PAIN, UNSPECIFIED CHRONICITY: Primary | ICD-10-CM

## 2021-01-22 NOTE — TELEPHONE ENCOUNTER
Patient called and said she was called today to schedule her Mri of the left knee for either [facility] or . Patient said that the earliest date they could find was 02/23/2021. Patient said that the person that was going to schedule her for the mri told her that her referral expires before then, so that they need a New Extended Referral . That they will not schedule her until this is done. Patient is asking if Mariano Mclean can place a new extended referral for her left knee , to be past the date of 02/23/2021. Patient would like a call when this is done   At tel. [phone].

## 2021-01-22 NOTE — TELEPHONE ENCOUNTER
MRI order re-entered with an \"expected date\" of 2/23/2021.  will contact patient to schedule an appointment.

## 2021-01-24 DIAGNOSIS — M17.12 ARTHRITIS OF LEFT KNEE: ICD-10-CM

## 2021-01-25 RX ORDER — CELECOXIB 200 MG/1
CAPSULE ORAL
Qty: 60 CAP | Refills: 2 | Status: SHIPPED | OUTPATIENT
Start: 2021-01-25 | End: 2021-04-19

## 2021-01-28 DIAGNOSIS — M25.562 LEFT KNEE PAIN, UNSPECIFIED CHRONICITY: ICD-10-CM

## 2021-02-23 ENCOUNTER — HOSPITAL ENCOUNTER (OUTPATIENT)
Dept: MRI IMAGING | Age: 61
Discharge: HOME | End: 2021-02-23
Attending: PHYSICIAN ASSISTANT

## 2021-02-23 DIAGNOSIS — M25.562 LEFT KNEE PAIN, UNSPECIFIED CHRONICITY: ICD-10-CM

## 2021-02-25 ENCOUNTER — OFFICE VISIT (OUTPATIENT)
Dept: ORTHOPEDIC SURGERY | Age: 61
End: 2021-02-25
Payer: OTHER GOVERNMENT

## 2021-02-25 VITALS — WEIGHT: 207.2 LBS | BODY MASS INDEX: 32.52 KG/M2 | TEMPERATURE: 97 F | HEIGHT: 67 IN

## 2021-02-25 DIAGNOSIS — M25.562 LEFT KNEE PAIN, UNSPECIFIED CHRONICITY: ICD-10-CM

## 2021-02-25 DIAGNOSIS — Z96.652 STATUS POST LEFT KNEE REPLACEMENT: Primary | ICD-10-CM

## 2021-02-25 DIAGNOSIS — M17.11 PRIMARY OSTEOARTHRITIS OF RIGHT KNEE: ICD-10-CM

## 2021-02-25 DIAGNOSIS — F41.9 ANXIETY: ICD-10-CM

## 2021-02-25 PROCEDURE — 99214 OFFICE O/P EST MOD 30 MIN: CPT | Performed by: PHYSICIAN ASSISTANT

## 2021-02-25 RX ORDER — LORAZEPAM 1 MG/1
TABLET ORAL
Qty: 2 TAB | Refills: 0 | Status: SHIPPED | OUTPATIENT
Start: 2021-02-25

## 2021-02-25 NOTE — PROGRESS NOTES
[address] [place] [address]  [phone]           Patient: King Shepherd                MRN: [id number]       SSN: [id number]  YOB: 1960        AGE: 62 y.o. SEX: female  Body mass index is 32.45 kg/m². PCP: Yi Rincon MD  02/25/21      This office note has been dictated. REVIEW OF SYSTEMS:  Constitutional: Negative for fever, chills, weight loss and malaise/fatigue. HENT: Negative. Eyes: Negative. Respiratory: Negative. Cardiovascular: Negative. Gastrointestinal: No bowel incontinence or constipation. Genitourinary: No bladder incontinence or saddle anesthesia. Skin: Negative. Neurological: Negative. Endo/Heme/Allergies: Negative. Psychiatric/Behavioral: Negative. Musculoskeletal: As per HPI above. Past Medical History:   Diagnosis Date    Diabetes ([facility] [address]          Current Outpatient Medications:     celecoxib (CELEBREX) 200 mg capsule, TAKE 1 CAPSULE BY MOUTH TWICE DAILY, Disp: 60 Cap, Rfl: 2    amoxicillin (AMOXIL) 500 mg capsule, 4 PO 1 hour before dental appointment, Disp: 4 Cap, Rfl: 3    diclofenac (VOLTAREN) 1 % gel, Apply 4 g to affected area four (4) times daily. , Disp: 100 g, Rfl: 4    rosuvastatin (CRESTOR) 10 mg tablet, Take 10 mg by mouth nightly., Disp: , Rfl:     multivit-min/ferrous fumarate (MULTI VITAMIN PO), Take 1 Tab by mouth daily. , Disp: , Rfl:     aspirin (ASPIRIN) 325 mg tablet, Take 1 Tab by mouth two (2) times a day., Disp: 60 Tab, Rfl: 0    cephALEXin (Keflex) 500 mg capsule, Take 1 Cap by mouth four (4) times daily. , Disp: 8 Cap, Rfl: 0    empagliflozin (JARDIANCE PO), Take 25 mg by mouth daily. , Disp: , Rfl:     metFORMIN (GLUCOPHAGE) 1,000 mg tablet, Take 1,000 mg by mouth two (2) times daily (with meals). , Disp: , Rfl:     dulaglutide (TRULICITY) 1.5 WO/6.4 mL sub-q pen, 1.5 mg by SubCUTAneous route every seven (7) days. , Disp: , Rfl:    ergocalciferol (VITAMIN D2) 50,000 unit capsule, Take 50,000 Units by mouth., Disp: , Rfl:     No Known Allergies    Social History     Socioeconomic History    Marital status: LEGALLY      Spouse name: Not on file    Number of children: Not on file    Years of education: Not on file    Highest education level: Not on file   Occupational History    Not on file   Social Needs    Financial resource strain: Not on file    Food insecurity     Worry: Not on file     Inability: Not on file    Transportation needs     Medical: Not on file     Non-medical: Not on file   Tobacco Use    Smoking status: Former Smoker    Smokeless tobacco: Never Used   Substance and Sexual Activity    Alcohol use: Not Currently    Drug use: Never    Sexual activity: Not on file   Lifestyle    Physical activity     Days per week: Not on file     Minutes per session: Not on file    Stress: Not on file   Relationships    Social connections     Talks on phone: Not on file     Gets together: Not on file     Attends Adventism service: Not on file     Active member of club or organization: Not on file     Attends meetings of clubs or organizations: Not on file     Relationship status: Not on file    Intimate partner violence     Fear of current or ex partner: Not on file     Emotionally abused: Not on file     Physically abused: Not on file     Forced sexual activity: Not on file   Other Topics Concern    Not on file   Social History Narrative    Not on file       Past Surgical History:   Procedure Laterality Date    HX GYN      c section    HX KNEE ARTHROSCOPY Right     ankle, fx     HX ORTHOPAEDIC Bilateral     minicus repair            Patient seen evaluate today for her left total knee replacement. She is now approximately 6 months status post surgery. She has been having some posterior knee discomfort. She can walk about 2 miles and then begins to have some discomfort in that back part of her knee.   She denies any start of pain. No feelings of instability. She does continue on Celebrex. I sent her for a [place] MRI and unfortunately she could not complete this due to some back discomfort lying still. Patient denies recent fevers, chills, chest pain, SOB, or injuries. No recent systemic changes noted. A 12-point review of systems is performed today. Pertinent positives are noted. All other systems reviewed and otherwise are negative. Physical exam: General: Alert and oriented x3, nad.  well-developed, well nourished. normal affect, AF. NC/AT, EOMI, neck supple, trachea midline, no JVD present. Breathing is non-labored. Examination of the lower extremities reveals pain-free range of the hips. There is no pain to palpation the trochanteric bursa. Negative straight leg raise. Negative calf tenderness. Negative Homans. No signs of DVT present. The left knee reveals skin intact. There is no erythema, ecchymosis, warmth. There are no signs of infection or cellulitis present. She has full range of motion of the knee. Good stability. Patella tracks nice without rubs or crepitus noted. She does have some discomfort to palpation to the hamstring tendons as well as the popliteal space. No fullness is noted. Assessment: #1 status post left knee replacement, #2 left knee pain, #3 hamstring tendinitis    Plan: At this point, we will move forward with reordering the [place] MRI of the left knee to evaluate her posterior pain. We will give her a prescription for Ativan for anxiety surrounding the MRI. We will see her back afterwards for review. She will continue on Celebrex twice a day with food. She is instructed on use as well as usual precautions. She will call with any questions or concerns that shall arise.             JR Mc SMITH, NILESH, ATC

## 2021-03-04 DIAGNOSIS — M25.562 LEFT KNEE PAIN, UNSPECIFIED CHRONICITY: ICD-10-CM

## 2021-03-18 ENCOUNTER — HOSPITAL ENCOUNTER (OUTPATIENT)
Dept: MRI IMAGING | Age: 61
Discharge: HOME | End: 2021-03-18
Attending: PHYSICIAN ASSISTANT
Payer: OTHER GOVERNMENT

## 2021-03-18 PROCEDURE — 73721 MRI JNT OF LWR EXTRE W/O DYE: CPT

## 2021-03-26 ENCOUNTER — OFFICE VISIT (OUTPATIENT)
Dept: ORTHOPEDIC SURGERY | Age: 61
End: 2021-03-26
Payer: OTHER GOVERNMENT

## 2021-03-26 VITALS
HEIGHT: 67 IN | TEMPERATURE: 96.9 F | OXYGEN SATURATION: 96 % | RESPIRATION RATE: 16 BRPM | HEART RATE: 72 BPM | BODY MASS INDEX: 33.27 KG/M2 | WEIGHT: 212 LBS

## 2021-03-26 DIAGNOSIS — M17.11 PRIMARY OSTEOARTHRITIS OF RIGHT KNEE: ICD-10-CM

## 2021-03-26 DIAGNOSIS — M79.609 POPLITEAL PAIN: Primary | ICD-10-CM

## 2021-03-26 DIAGNOSIS — Z96.652 STATUS POST LEFT KNEE REPLACEMENT: ICD-10-CM

## 2021-03-26 DIAGNOSIS — M25.562 LEFT KNEE PAIN, UNSPECIFIED CHRONICITY: ICD-10-CM

## 2021-03-26 PROCEDURE — 99214 OFFICE O/P EST MOD 30 MIN: CPT | Performed by: PHYSICIAN ASSISTANT

## 2021-03-26 PROCEDURE — 20611 DRAIN/INJ JOINT/BURSA W/US: CPT | Performed by: PHYSICIAN ASSISTANT

## 2021-03-26 RX ORDER — BETAMETHASONE SODIUM PHOSPHATE AND BETAMETHASONE ACETATE 3; 3 MG/ML; MG/ML
3 INJECTION, SUSPENSION INTRA-ARTICULAR; INTRALESIONAL; INTRAMUSCULAR; SOFT TISSUE ONCE
Status: COMPLETED | OUTPATIENT
Start: 2021-03-26 | End: 2021-03-26

## 2021-03-26 RX ADMIN — BETAMETHASONE SODIUM PHOSPHATE AND BETAMETHASONE ACETATE 6 MG: 3; 3 INJECTION, SUSPENSION INTRA-ARTICULAR; INTRALESIONAL; INTRAMUSCULAR; SOFT TISSUE at 15:48

## 2021-03-26 NOTE — PROGRESS NOTES
[address] [place] [address]  [phone]           Patient: Maude Orellana                MRN: [id number]       SSN: [id number]  YOB: 1960        AGE: 62 y.o. SEX: female  Body mass index is 33.2 kg/m². PCP: Elsa García MD  03/26/21            REVIEW OF SYSTEMS:  Constitutional: Negative for fever, chills, weight loss and malaise/fatigue. HENT: Negative. Eyes: Negative. Respiratory: Negative. Cardiovascular: Negative. Gastrointestinal: No bowel incontinence or constipation. Genitourinary: No bladder incontinence or saddle anesthesia. Skin: Negative. Neurological: Negative. Endo/Heme/Allergies: Negative. Psychiatric/Behavioral: Negative. Musculoskeletal: As per HPI above. Past Medical History:   Diagnosis Date    [facility]          Current Outpatient Medications:     LORazepam (Ativan) 1 mg tablet, 1 MG, 1 HOUR BEFORE MRI. REPEAT 30 MINUTES BEFORE MRI PRN FOR ANXIETY. Indications: anxious, Disp: 2 Tab, Rfl: 0    celecoxib (CELEBREX) 200 mg capsule, TAKE 1 CAPSULE BY MOUTH TWICE DAILY, Disp: 60 Cap, Rfl: 2    diclofenac (VOLTAREN) 1 % gel, Apply 4 g to affected area four (4) times daily. , Disp: 100 g, Rfl: 4    rosuvastatin (CRESTOR) 10 mg tablet, Take 10 mg by mouth nightly., Disp: , Rfl:     multivit-min/ferrous fumarate (MULTI VITAMIN PO), Take 1 Tab by mouth daily. , Disp: , Rfl:     aspirin (ASPIRIN) 325 mg tablet, Take 1 Tab by mouth two (2) times a day., Disp: 60 Tab, Rfl: 0    cephALEXin (Keflex) 500 mg capsule, Take 1 Cap by mouth four (4) times daily. , Disp: 8 Cap, Rfl: 0    empagliflozin (JARDIANCE PO), Take 25 mg by mouth daily. , Disp: , Rfl:     metFORMIN (GLUCOPHAGE) 1,000 mg tablet, Take 1,000 mg by mouth two (2) times daily (with meals). , Disp: , Rfl:     dulaglutide (TRULICITY) 1.5 FH/2.4 mL sub-q pen, 1.5 mg by SubCUTAneous route every seven (7) days. , Disp: , Rfl:     ergocalciferol (VITAMIN D2) 50,000 unit capsule, Take 50,000 Units by mouth., Disp: , Rfl:     amoxicillin (AMOXIL) 500 mg capsule, 4 PO 1 hour before dental appointment, Disp: 4 Cap, Rfl: 3    No Known Allergies    Social History     Socioeconomic History    Marital status: LEGALLY      Spouse name: Not on file    Number of children: Not on file    Years of education: Not on file    Highest education level: Not on file   Occupational History    Not on file   Social Needs    Financial resource strain: Not on file    Food insecurity     Worry: Not on file     Inability: Not on file    Transportation needs     Medical: Not on file     Non-medical: Not on file   Tobacco Use    Smoking status: Former Smoker    Smokeless tobacco: Never Used   Substance and Sexual Activity    Alcohol use: Not Currently    Drug use: Never    Sexual activity: Not on file   Lifestyle    Physical activity     Days per week: Not on file     Minutes per session: Not on file    Stress: Not on file   Relationships    Social connections     Talks on phone: Not on file     Gets together: Not on file     Attends Oriental orthodox service: Not on file     Active member of club or organization: Not on file     Attends meetings of clubs or organizations: Not on file     Relationship status: Not on file    Intimate partner violence     Fear of current or ex partner: Not on file     Emotionally abused: Not on file     Physically abused: Not on file     Forced sexual activity: Not on file   Other Topics Concern    Not on file   Social History Narrative    Not on file       Past Surgical History:   Procedure Laterality Date    HX GYN      c section    HX KNEE ARTHROSCOPY Right     ankle, fx     HX ORTHOPAEDIC Bilateral     minicus repair        Patient seen and evaluated today for her left lower extremity. She is 8 months status post left total replacement surgery.   She is having some pain in the posterior aspect of her knee.  She has had an ultrasound which was normal.  I sent her for a [facility] stress today for reevaluation. She does continue have discomfort in the back of the knee. Is worse with bending her knee and with ambulation. She has tried anti-inflammatories to no avail. Patient denies recent fevers, chills, chest pain, SOB, or injuries. No recent systemic changes noted. A 12-point review of systems is performed today. Pertinent positives are noted. All other systems reviewed and otherwise are negative. Physical exam: General: Alert and oriented x3, nad.  well-developed, well nourished. normal affect, AF. NC/AT, EOMI, neck supple, trachea midline, no JVD present. Breathing is non-labored. Examination of the lower extremities reveals pain-free range of motion hips. There is no pain to palpation the trochanter bursa. Neck straight leg raise. Negative calf tenderness. Negative Homans. No signs of DVT present. Left knee with skin intact. There is no erythema, ecchymosis, warmth. There are no signs of infection or cellulitis present. She has full range of motion. Excellent stability. Patella tracks nicely without rubs or cups noted. There is some tenderness palpation to the posterior lateral knee in the area of the popliteus. Review of MRI left knee:  IMPRESSION  --------------     1. Despite utilization of metal artifact reduction parameters, assessment of the  bone prosthesis interfaces of the indwelling arthroplasty is significantly  limited.     > No findings to suggest discrete periprosthetic fracture. 2. Relatively diffuse patellar tendon thickening with findings concerning for  potential partial-thickness patellar tendon tear.     > Correlation with extensor mechanism integrity and range of motion is  recommended with potential benefit from directed ultrasound evaluation as this  region is not well assessed secondary to MR artifact.   3. Small left knee joint effusion without discrete Baker's cyst formation    Assessment: Status post left total knee replacement with possible popliteal impingement    Plan: At this point, we discussed her medicines. We will try cortisone injection for the left knee around the area the popliteal tendon to see if we can decrease the inflammation. Today in the office after informed consent, under sterile conditions the left knee was prepped with Betadine and a mixture of 1 mL of 1% lidocaine and 3 mg of Celestone was injected into the left knee lateral popliteal tendon with ultrasound-guided assistance without complications. We will see her back in 4 weeks time for evaluation to  efficacy of the injection. She will call with any questions or concerns that shall arise. Chart reviewed for the following:  Mike FORTE PA-C, have reviewed the History, Physical and updated the Allergic reactions for Ashok Calderón? TIME OUT performed immediately prior to start of procedure:  Mike FORTE PA-C, have performed the following reviews on Ashok Calderón prior to the start of the procedure:  ????????  * Patient was identified by name and date of birth   * Agreement on procedure being performed was verified  * Risks and Benefits explained to the patient  * Procedure site verified and marked as necessary  * Patient was positioned for comfort  * Consent was signed and verified    Time:3:33 PM    Body part: left knee, intra-articular    Medication & Dose: 1ml 1% lidocaine and 3mg celestone    Date of procedure: 03/26/21    Procedure performed by: Mike Mcclendon PA-C    Provider assisted by: none    Patient assisted by: self    How tolerated by patient: tolerated the procedure well with no complications    Post Procedural Pain Scale: 6    Comments:   [address] using a frequency of 10MHz with a 12L-RS transducer head was used to confirm needle placement.   Ultrasound images captured using [address] Ultrasound machine and scanned into patient's chart       Dirk Delgado, PA-C, ATC

## 2021-04-29 ENCOUNTER — OFFICE VISIT (OUTPATIENT)
Dept: ORTHOPEDIC SURGERY | Age: 61
End: 2021-04-29
Payer: OTHER GOVERNMENT

## 2021-04-29 VITALS
WEIGHT: 218 LBS | TEMPERATURE: 97.1 F | BODY MASS INDEX: 34.21 KG/M2 | HEIGHT: 67 IN | HEART RATE: 90 BPM | OXYGEN SATURATION: 98 %

## 2021-04-29 DIAGNOSIS — M17.12 ARTHRITIS OF LEFT KNEE: ICD-10-CM

## 2021-04-29 DIAGNOSIS — Z96.652 STATUS POST LEFT KNEE REPLACEMENT: Primary | ICD-10-CM

## 2021-04-29 DIAGNOSIS — M79.609 POPLITEAL PAIN: ICD-10-CM

## 2021-04-29 DIAGNOSIS — M17.11 PRIMARY OSTEOARTHRITIS OF RIGHT KNEE: ICD-10-CM

## 2021-04-29 PROCEDURE — 99214 OFFICE O/P EST MOD 30 MIN: CPT | Performed by: PHYSICIAN ASSISTANT

## 2021-04-29 NOTE — PROGRESS NOTES
[address] [place] [address]  [phone]           Patient: Sandor Morejon                MRN: [id number]       SSN: [id number]  YOB: 1960        AGE: 62 y.o. SEX: female  Body mass index is 34.14 kg/m². PCP: Suzanna Vergara MD  04/29/21      This office note has been dictated. REVIEW OF SYSTEMS:  Constitutional: Negative for fever, chills, weight loss and malaise/fatigue. HENT: Negative. Eyes: Negative. Respiratory: Negative. Cardiovascular: Negative. Gastrointestinal: No bowel incontinence or constipation. Genitourinary: No bladder incontinence or saddle anesthesia. Skin: Negative. Neurological: Negative. Endo/Heme/Allergies: Negative. Psychiatric/Behavioral: Negative. Musculoskeletal: As per HPI above. Past Medical History:   Diagnosis Date    Asthma     Cancer ([facility]     colon cancer    Diabetes ([facility]     Hypertension          Current Outpatient Medications:     celecoxib (CELEBREX) 200 mg capsule, TAKE 1 CAPSULE BY MOUTH TWICE DAILY, Disp: 60 Cap, Rfl: 2    amoxicillin (AMOXIL) 500 mg capsule, 4 PO 1 hour before dental appointment, Disp: 4 Cap, Rfl: 3    diclofenac (VOLTAREN) 1 % gel, Apply 4 g to affected area four (4) times daily. , Disp: 100 g, Rfl: 4    rosuvastatin (CRESTOR) 10 mg tablet, Take 10 mg by mouth nightly., Disp: , Rfl:     multivit-min/ferrous fumarate (MULTI VITAMIN PO), Take 1 Tab by mouth daily. , Disp: , Rfl:     empagliflozin (JARDIANCE PO), Take 25 mg by mouth daily. , Disp: , Rfl:     metFORMIN (GLUCOPHAGE) 1,000 mg tablet, Take 1,000 mg by mouth two (2) times daily (with meals). , Disp: , Rfl:     dulaglutide (TRULICITY) 1.5 UL/1.6 mL sub-q pen, 1.5 mg by SubCUTAneous route every seven (7) days. , Disp: , Rfl:     ergocalciferol (VITAMIN D2) 50,000 unit capsule, Take 50,000 Units by mouth., Disp: , Rfl:     LORazepam (Ativan) 1 mg tablet, 1 MG, 1 HOUR BEFORE MRI. REPEAT 30 MINUTES BEFORE MRI PRN FOR ANXIETY. Indications: anxious, Disp: 2 Tab, Rfl: 0    aspirin (ASPIRIN) 325 mg tablet, Take 1 Tab by mouth two (2) times a day., Disp: 60 Tab, Rfl: 0    cephALEXin (Keflex) 500 mg capsule, Take 1 Cap by mouth four (4) times daily. , Disp: 8 Cap, Rfl: 0    No Known Allergies    Social History     Socioeconomic History    Marital status: LEGALLY      Spouse name: Not on file    Number of children: Not on file    Years of education: Not on file    Highest education level: Not on file   Occupational History    Not on file   Social Needs    Financial resource strain: Not on file    Food insecurity     Worry: Not on file     Inability: Not on file    Transportation needs     Medical: Not on file     Non-medical: Not on file   Tobacco Use    Smoking status: Former Smoker    Smokeless tobacco: Never Used   Substance and Sexual Activity    Alcohol use: Not Currently    Drug use: Never    Sexual activity: Not on file   Lifestyle    Physical activity     Days per week: Not on file     Minutes per session: Not on file    Stress: Not on file   Relationships    Social connections     Talks on phone: Not on file     Gets together: Not on file     Attends Restorationism service: Not on file     Active member of club or organization: Not on file     Attends meetings of clubs or organizations: Not on file     Relationship status: Not on file    Intimate partner violence     Fear of current or ex partner: Not on file     Emotionally abused: Not on file     Physically abused: Not on file     Forced sexual activity: Not on file   Other Topics Concern    Not on file   Social History Narrative    Not on file       Past Surgical History:   Procedure Laterality Date    HX GYN      c section    HX KNEE ARTHROSCOPY Right     ankle, fx     HX ORTHOPAEDIC Bilateral     minicus repair     HX OTHER SURGICAL  2003    screw in right ankle         Patient seen and evaluated today for her left knee. She is approximate month status post left total knee replacement surgery. She was having some posterior lateral discomfort of her knee in which she had a work-up which was negative. She is had a negative MRI as well as negative infectious labs. I gave her a cortisone injection upon the last visit which helped her considerably. She is having no pain over the past couple days. Patient denies recent fevers, chills, chest pain, SOB, or injuries. No recent systemic changes noted. A 12-point review of systems is performed today. Pertinent positives are noted. All other systems reviewed and otherwise are negative. Physical exam: General: Alert and oriented x3, nad.  well-developed, well nourished. normal affect, AF. NC/AT, EOMI, neck supple, trachea midline, no JVD present. Breathing is non-labored. Examination of the lower extremities reveals pain-free range of motion the hips. There is no pain to palpation trochanter bursa. Neck straight leg raise per negative calf tenderness. Negative Homans. No signs of DVT present. The left knee reveals skin intact. There is no erythema, ecchymosis, warmth. There are no signs of infection or cellulitis present. She has full range of motion. Excellent stability. Patella tracks nicely without rubs or crepitus noted. There is no tenderness palpation about the knee today. Assessment: Status post left total replacement, possible popliteal impingement    Plan: At this point, fortunately she is improved and doing well. We will see her back at the anniversary of her knee replacement. She will come back in sooner if necessary. We did discuss possibility of surgical intervention and popliteal release if necessary. It may be some scar tissue in the back of the knee as well as causing her discomfort.               JR Mc SMITH, NILESH, ATC

## 2021-07-17 DIAGNOSIS — M17.12 ARTHRITIS OF LEFT KNEE: ICD-10-CM

## 2021-07-19 RX ORDER — CELECOXIB 200 MG/1
CAPSULE ORAL
Qty: 60 CAPSULE | Refills: 2 | Status: SHIPPED | OUTPATIENT
Start: 2021-07-19 | End: 2021-10-15

## 2021-07-29 ENCOUNTER — OFFICE VISIT (OUTPATIENT)
Dept: ORTHOPEDIC SURGERY | Age: 61
End: 2021-07-29
Payer: OTHER GOVERNMENT

## 2021-07-29 VITALS
OXYGEN SATURATION: 97 % | HEIGHT: 67 IN | TEMPERATURE: 97 F | BODY MASS INDEX: 34.69 KG/M2 | WEIGHT: 221 LBS | HEART RATE: 95 BPM

## 2021-07-29 DIAGNOSIS — Z96.652 STATUS POST LEFT KNEE REPLACEMENT: Primary | ICD-10-CM

## 2021-07-29 DIAGNOSIS — M79.609 POPLITEAL PAIN: ICD-10-CM

## 2021-07-29 DIAGNOSIS — M25.562 LEFT KNEE PAIN, UNSPECIFIED CHRONICITY: ICD-10-CM

## 2021-07-29 PROCEDURE — 73562 X-RAY EXAM OF KNEE 3: CPT | Performed by: PHYSICIAN ASSISTANT

## 2021-07-29 PROCEDURE — 99214 OFFICE O/P EST MOD 30 MIN: CPT | Performed by: PHYSICIAN ASSISTANT

## 2021-07-29 RX ORDER — DICLOFENAC SODIUM 10 MG/G
4 GEL TOPICAL 4 TIMES DAILY
Qty: 100 G | Refills: 4 | Status: SHIPPED | OUTPATIENT
Start: 2021-07-29

## 2021-07-29 RX ORDER — MELOXICAM 15 MG/1
15 TABLET ORAL DAILY
Qty: 30 TABLET | Refills: 2 | Status: SHIPPED | OUTPATIENT
Start: 2021-07-29 | End: 2021-08-28

## 2021-07-29 RX ORDER — BETAMETHASONE SODIUM PHOSPHATE AND BETAMETHASONE ACETATE 3; 3 MG/ML; MG/ML
6 INJECTION, SUSPENSION INTRA-ARTICULAR; INTRALESIONAL; INTRAMUSCULAR; SOFT TISSUE ONCE
Status: CANCELLED | OUTPATIENT
Start: 2021-07-29 | End: 2021-07-30

## 2021-07-29 NOTE — PROGRESS NOTES
[address] [place] [address]  [phone]           Patient: Kenneth Cheng                MRN: [id number]       SSN: [id number]  YOB: 1960        AGE: 62 y.o. SEX: female  Body mass index is 34.61 kg/m². PCP: Madhuri Galdamez MD  07/29/21      This office note has been dictated. REVIEW OF SYSTEMS:  Constitutional: Negative for fever, chills, weight loss and malaise/fatigue. HENT: Negative. Eyes: Negative. Respiratory: Negative. Cardiovascular: Negative. Gastrointestinal: No bowel incontinence or constipation. Genitourinary: No bladder incontinence or saddle anesthesia. Skin: Negative. Neurological: Negative. Endo/Heme/Allergies: Negative. Psychiatric/Behavioral: Negative. Musculoskeletal: As per HPI above. Past Medical History:   Diagnosis Date    Asthma     Cancer ([facility]     colon cancer    Diabetes ([facility]     Hypertension          Current Outpatient Medications:     celecoxib (CELEBREX) 200 mg capsule, TAKE 1 CAPSULE BY MOUTH TWICE DAILY, Disp: 60 Capsule, Rfl: 2    rosuvastatin (CRESTOR) 10 mg tablet, Take 10 mg by mouth nightly., Disp: , Rfl:     multivit-min/ferrous fumarate (MULTI VITAMIN PO), Take 1 Tab by mouth daily. , Disp: , Rfl:     empagliflozin (JARDIANCE PO), Take 25 mg by mouth daily. , Disp: , Rfl:     metFORMIN (GLUCOPHAGE) 1,000 mg tablet, Take 1,000 mg by mouth two (2) times daily (with meals). , Disp: , Rfl:     dulaglutide (TRULICITY) 1.5 FI/7.2 mL sub-q pen, 1.5 mg by SubCUTAneous route every seven (7) days. , Disp: , Rfl:     LORazepam (Ativan) 1 mg tablet, 1 MG, 1 HOUR BEFORE MRI. REPEAT 30 MINUTES BEFORE MRI PRN FOR ANXIETY.   Indications: anxious (Patient not taking: Reported on 7/29/2021), Disp: 2 Tab, Rfl: 0    amoxicillin (AMOXIL) 500 mg capsule, 4 PO 1 hour before dental appointment (Patient not taking: Reported on 7/29/2021), Disp: 4 Cap, Rfl: 3   diclofenac (VOLTAREN) 1 % gel, Apply 4 g to affected area four (4) times daily. (Patient not taking: Reported on 7/29/2021), Disp: 100 g, Rfl: 4    aspirin (ASPIRIN) 325 mg tablet, Take 1 Tab by mouth two (2) times a day. (Patient not taking: Reported on 7/29/2021), Disp: 60 Tab, Rfl: 0    cephALEXin (Keflex) 500 mg capsule, Take 1 Cap by mouth four (4) times daily. (Patient not taking: Reported on 7/29/2021), Disp: 8 Cap, Rfl: 0    ergocalciferol (VITAMIN D2) 50,000 unit capsule, Take 50,000 Units by mouth. (Patient not taking: Reported on 7/29/2021), Disp: , Rfl:     No Known Allergies    Social History     Socioeconomic History    Marital status: LEGALLY      Spouse name: Not on file    Number of children: Not on file    Years of education: Not on file    Highest education level: Not on file   Occupational History    Not on file   Tobacco Use    Smoking status: Former Smoker    Smokeless tobacco: Never Used   Substance and Sexual Activity    Alcohol use: Not Currently    Drug use: Never    Sexual activity: Not on file   Other Topics Concern    Not on file   Social History Narrative    Not on file     Social Determinants of Health     Financial Resource Strain:     Difficulty of Paying Living Expenses:    Food Insecurity:     Worried About [address] in the Last Year:     [address] in the Last Year:    Transportation Needs:     Lack of Transportation (Medical):      Lack of Transportation (Non-Medical):    Physical Activity:     Days of Exercise per Week:     Minutes of Exercise per Session:    Stress:     Feeling of Stress :    Social Connections:     Frequency of Communication with Friends and Family:     Frequency of Social Gatherings with Friends and Family:     Attends Mormonism Services:     Active Member of Clubs or Organizations:     Attends Club or Organization Meetings:     Marital Status:    Intimate Partner Violence:     Fear of Current or Ex-Partner:     Emotionally Abused:     Physically Abused:     Sexually Abused:        Past Surgical History:   Procedure Laterality Date    HX GYN      c section    HX KNEE ARTHROSCOPY Right     ankle, fx     HX ORTHOPAEDIC Bilateral     minicus repair     HX OTHER SURGICAL  2003    screw in right ankle         Patient seen today for her left total knee replacement. She is status post left replacement surgery and she is now approximately 1 year. She has done well with her knee. She does continue to have some posterior lateral discomfort. She had a work-up with an ultrasound as well as an MRI past which showed no abnormality. She has no start of pain. No signs of instability. No troubles with the wound. Patient denies recent fevers, chills, chest pain, SOB, or injuries. No recent systemic changes noted. A 12-point review of systems is performed today. Pertinent positives are noted. All other systems reviewed and otherwise are negative. Physical exam: General: Alert and oriented x3, nad.  well-developed, well nourished. normal affect, AF. NC/AT, EOMI, neck supple, trachea midline, no JVD present. Breathing is non-labored. Examination of lower extremities was pain-free range of motion hips. There is no pain to palpation of trochanter bursa. Negative straight leg raise. Negative calf tenderness. Negative Homans. No signs of DVT present. The left knee reveals skin intact. There is no erythema, ecchymosis, warmth. There are no signs of infection or cellulitis present. She does have a little tenderness to palpation to the posterior lateral corner around the area of the popliteal tendon. She has negative popliteal cyst.  There are no signs of infection. She has full range of motion. Excellent stability.     Radiographs pain office today 7/29/2021 at the [facility] location include AP, lateral, skyline of the left knee shows a total knee components to be well fixed without evidence for loosening or fracture noted. Assessment: Status post left total knee replacement, popliteal tendinitis left knee    Plan: At this point, we will start her on Mobic 15 mg once a day with food as well as a prescription for Voltaren gel to apply to the area 3-4 times a day. We will get her into physical therapy for total knee protocol as well as ultrasound, iontophoresis for the popliteal space and lateral corners of the popliteal tendon. We will see her back in about 6 weeks time for evaluation. We can consider an injection at that point if necessary.               JR Mc SMITH, PALaurentC, ATC

## 2021-08-09 ENCOUNTER — HOSPITAL ENCOUNTER (OUTPATIENT)
Dept: PHYSICAL THERAPY | Age: 61
Discharge: HOME | End: 2021-08-09
Payer: OTHER GOVERNMENT

## 2021-08-09 PROCEDURE — 97162 PT EVAL MOD COMPLEX 30 MIN: CPT

## 2021-08-09 PROCEDURE — 97110 THERAPEUTIC EXERCISES: CPT

## 2021-08-09 PROCEDURE — 97535 SELF CARE MNGMENT TRAINING: CPT

## 2021-08-09 NOTE — PROGRESS NOTES
[facility] PHYSICAL THERAPY  [address] [place] [address] 51, [address],[facility], [address] - Phone: [phone]  Fax: [phone] / [address]  Patient Name: Christopher Bentley : 1960   Medical   Diagnosis: Left posterior knee pain Treatment Diagnosis: Left knee pain [M25.562]   Onset Date: 20     Referral Source: Kiki Carbone MD Start of Care [facility]): 2021   Prior Hospitalization: See medical history Provider #: [id number]   Prior Level of Function: Prior to TKA , no post knee pain with ambulation   Comorbidities: DM, arthritis   Medications: Verified on Patient Summary List   The Plan of Care and following information is based on the information from the initial evaluation.   ===========================================================================================  Assessment / key information:  Christopher Bentley is a 62 y.o.  yo female with Dx: left post knee pain, s/p left TKA dos; 20, who reports post knee pain since surgery last July. Pt rates pain as 7/10 max, 2/10 at best, 2/10 today located post/lat left knee, increasing with gait, hamstring stretch or deep pressure. Prior treatment includes injection with relief approx 1 month, meds, ice/elevation, topical pain cream.  Pt also needing right TKA however holding on this surgery until left knee recovers more. Objective: FOTO score = 47 (an established functional score where 100 = no disability). AAROM knee left 0-110, right 5-120. Gait shows right genu varus, no deviations on left. Manual muscle testing shows painful knee flexion on left, else WNL symmetrical.  90/90 HS flexibility right 15 min stretch, left 20 with increased discomfort. No difficulty with supine bridge. Standing gastroc stretch with discomfort/pain initially. Palpation shows tenderness and noted pocket of edema at left post/lat knee.   Pt instructed in HEP and will f/u in clinic for PT.  ===========================================================================================  Eval Complexity: History MEDIUM  Complexity : 1-2 comorbidities / personal factors will impact the outcome/ POC ;  Examination  HIGH Complexity : 4+ Standardized tests and measures addressing body structure, function, activity limitation and / or participation in recreation ; Presentation MEDIUM Complexity : Evolving with changing characteristics ; Decision Making MEDIUM Complexity : FOTO score of 26-74; Overall Complexity MEDIUM  Problem List: pain affecting function, decrease ROM, decrease strength, impaired gait/ balance, decrease ADL/ functional abilitiies, decrease activity tolerance and decrease flexibility/ joint mobility FOTO = 47  Treatment Plan may include any combination of the following: Therapeutic exercise, Therapeutic activities, Neuromuscular re-education, Physical agent/modality, Gait/balance training, Manual therapy, Aquatic therapy, Patient education and Self Care training  Patient / Family readiness to learn indicated by: asking questions, trying to perform skills and interest  Persons(s) to be included in education: patient (P)  Barriers to Learning/Limitations: no  Measures taken, if barriers to learning: N/A   Patient Goal (s): \"daily activities without pain\"   Patient self reported health status: fair  Rehabilitation Potential: good   Short Term Goals: To be accomplished in  1-2  weeks:  1. Independent with HEP. 2. Decrease max pain 25-50% to assist with amb > 20 minutes.  Long Term Goals: To be accomplished in  4-6  weeks:  1. Decrease max pain 50-75% to assist with amb > 30 min  2. Improve FOTO Functional Status Score by 14 points in order to show significant functional improvement. 3.  Improve tolerance to left knee flexion resistance, pain free, in order to improve ambulation tolerance.   Frequency / Duration:   Patient to be seen  2-3  times per week for 4-6 weeks:  Patient / Caregiver education and instruction: self care and exercises  Therapist Signature: Melissa Castillo PT, DPT, OCS, CSCS Date: 1/1/0672   Certification Period: NA Time: 2:02 PM   ===========================================================================================  I certify that the above Physical Therapy Services are being furnished while the patient is under my care. I agree with the treatment plan and certify that this therapy is necessary. Physician Signature:        Date:       Time:                                         Elenita Khalil MD  Please sign and return to [facility] or you may fax the signed copy to [phone]. Thank you.

## 2021-08-09 NOTE — PROGRESS NOTES
PHYSICAL THERAPY - DAILY TREATMENT NOTE    Patient Name: Rosenda Winston        Date: 2021  : 1960   YES Patient  Verified  Visit #:     Insurance: Payor: DEENA / Plan: Phillip Farias 74 / Product Type:  /      In time: 1:59 Out time: 2:47   Total Treatment Time: 48     Medicare/BCBS Time Tracking (below)   Total Timed Codes (min):  na 1:1 Treatment Time:  na     TREATMENT AREA =  Left knee    SUBJECTIVE    Pain Level (on 0 to 10 scale):  2  / 10   Medication Changes/New allergies or changes in medical history, any new surgeries or procedures? NO    If yes, update Summary List   Subjective Functional Status/Changes:  []  No changes reported     See POC          OBJECTIVE    13 min Therapeutic Exercise:  [x]  See flow sheet   Rationale:      increase ROM, increase strength and reduce pain to improve the patients ability to amb prolonged     10 min Self Care: Reviewed diagnosis, prognosis, therapy progression   Rationale:    Improve understanding of injury and therapy to have realistic expectation of therapy to improve compliance/adherence and satisfaction    Billed With/As:   [x] TE   [] TA   [] Neuro   [x] Self Care Patient Education: [x] Review HEP    [] Progressed/Changed HEP based on:   [] positioning   [] body mechanics   [] transfers   [] heat/ice application    [] other:        Other Objective/Functional Measures:    Shown and performed HEP     Post Treatment Pain Level (on 0 to 10) scale:   2 / 10     ASSESSMENT  Assessment/Changes in Function:     See POC     []  See Progress Note/Recertification   Patient will continue to benefit from skilled PT services to modify and progress therapeutic interventions, address functional mobility deficits, address strength deficits, analyze and address soft tissue restrictions, analyze and cue movement patterns and analyze and modify body mechanics/ergonomics to attain goals per POC.    Progress toward goals / Updated goals:    See POC     PLAN  [x]  Upgrade activities as tolerated YES Continue plan of care   []  Discharge due to :    []  Other:      Therapist: Rey Avila PT, DPT, OCS, CSCS    Date: 8/9/2021 Time: 2:02 PM

## 2021-08-11 ENCOUNTER — HOSPITAL ENCOUNTER (OUTPATIENT)
Dept: PHYSICAL THERAPY | Age: 61
Discharge: HOME | End: 2021-08-11
Payer: OTHER GOVERNMENT

## 2021-08-11 PROCEDURE — 97035 APP MDLTY 1+ULTRASOUND EA 15: CPT

## 2021-08-11 PROCEDURE — 97110 THERAPEUTIC EXERCISES: CPT

## 2021-08-11 PROCEDURE — 97140 MANUAL THERAPY 1/> REGIONS: CPT

## 2021-08-11 NOTE — PROGRESS NOTES
PHYSICAL THERAPY - DAILY TREATMENT NOTE    Patient Name: Vida Foy        Date: 2021  : 1960   yes Patient  Verified  Visit #:      of   12  Insurance: Payor: DEENA / Plan: Phillip Farias 74 / Product Type:  /      In time: 810 Out time: 910   Total Treatment Time: 60     Medicare/BCBS Time Tracking (below)   Total Timed Codes (min):  na 1:1 Treatment Time:  na     TREATMENT AREA =  Left knee pain [M25.562]    SUBJECTIVE  Pain Level (on 0 to 10 scale):  5  / 10   Medication Changes/New allergies or changes in medical history, any new surgeries or procedures?    no  If yes, update Summary List   Subjective Functional Status/Changes:  []  No changes reported     They want to cut my leg open again because there is this plastic that's hitting nerve. OBJECTIVE  Modalities Rationale:     decrease inflammation and decrease pain to improve patient's ability to perform functional mobility activities with decrease c/o symptoms. min [] Estim, type/location:                                      []  att     []  unatt     []  w/US     []  w/ice    []  w/heat    min []  Mechanical Traction: type/lbs                   []  pro   []  sup   []  int   []  cont    []  before manual    []  after manual   8 min [x]  Ultrasound, settings/location:  Pulsed 20% post/medial aspect (L) knee.   3.3 MHz    min []  Iontophoresis w/ dexamethasone, location:                                               []  take home patch       []  in clinic    min []  Ice     []  Heat    location/position:     min []  Vasopneumatic Device, press/temp:    If using vaso (only need to measure limb vaso being performed on)      pre-treatment girth :       post-treatment girth :       measured at (landmark location) :      min []  Other:    [x] Skin assessment post-treatment (if applicable):    [x]  intact    []  redness- no adverse reaction                  []redness  adverse reaction:      37 min Therapeutic Exercise: [x]  See flow sheet   Rationale:      increase ROM, increase strength, improve coordination and improve balance to improve the patients ability to perform functional mobility activities with decrease c/o symptoms. 15 min Manual Therapy: TPR post medial (L) knee, HS stretch with contract/release. Rationale:      decrease pain, increase ROM and increase tissue extensibility to improve patient's ability to perform functional mobility activities with decrease c/o symptoms. The manual therapy interventions were performed at a separate and distinct time from the therapeutic activities interventions. Billed With/As:   [x] TE   [] TA   [] Neuro   [] Self Care Patient Education: [x] Review HEP    [] Progressed/Changed HEP based on:   [] positioning   [] body mechanics   [] transfers   [] heat/ice application    [] other:      Other Objective/Functional Measures:    No change in functional measurements today. US performed with approval from [facility] DPT. Post Treatment Pain Level (on 0 to 10) scale:   3  / 10     ASSESSMENT  Assessment/Changes in Function:     Overall good tolerance to all therapeutic interventions for first treatment session     []  See Progress Note/Recertification   Patient will continue to benefit from skilled PT services to modify and progress therapeutic interventions, address functional mobility deficits, address ROM deficits, address strength deficits, analyze and address soft tissue restrictions and analyze and cue movement patterns to attain remaining goals. Progress toward goals / Updated goals: · Short Term Goals: To be accomplished in  1-2  weeks:  1. Independent with HEP. 2. Decrease max pain 25-50% to assist with amb > 20 minutes. · Long Term Goals: To be accomplished in  4-6  weeks:  1. Decrease max pain 50-75% to assist with amb > 30 min  2. Improve FOTO Functional Status Score by 14 points in order to show significant functional improvement.   3.  Improve tolerance to left knee flexion resistance, pain free, in order to improve ambulation tolerance    No change toward goals today.          PLAN  []  Upgrade activities as tolerated yes Continue plan of care   []  Discharge due to :    []  Other:      Therapist: Bri Adames PTA    Date: 8/11/2021 Time: 6:16 AM     Future Appointments   Date Time Provider Jose Lucero   8/11/2021  8:30 AM Etienne Chris, PTA [facility] [address]   8/18/2021 10:00 AM Etienne Chris,  [address] [facility]   8/20/2021  7:45 AM Etienne Chris, PTA [address] [address]   8/25/2021 10:00 AM Etienne Chris,  [address] [facility]   8/27/2021 10:00 AM Etienne Chris,  [address] [facility]   8/30/2021 10:00 AM Etienne Chris,  [address] [facility]   9/1/2021 10:00 AM Etienne Chris,  [address] [facility]   9/8/2021 10:00 AM Ivon Mcdonald [address] [facility]   9/10/2021  7:45 AM Katarzyna George, PTA [address]

## 2021-08-18 ENCOUNTER — HOSPITAL ENCOUNTER (OUTPATIENT)
Dept: PHYSICAL THERAPY | Age: 61
Discharge: HOME | End: 2021-08-18
Payer: OTHER GOVERNMENT

## 2021-08-18 PROCEDURE — 97140 MANUAL THERAPY 1/> REGIONS: CPT

## 2021-08-18 PROCEDURE — 97110 THERAPEUTIC EXERCISES: CPT

## 2021-08-18 PROCEDURE — 97035 APP MDLTY 1+ULTRASOUND EA 15: CPT

## 2021-08-18 NOTE — PROGRESS NOTES
PHYSICAL THERAPY - DAILY TREATMENT NOTE    Patient Name: Madhavi Orr        Date: 2021  : 1960   yes Patient  Verified  Visit #:   3   of   12  Insurance: Payor:  / Plan: Phillip Farias 74 / Product Type:  /      In time: 1005 Out time: 1050   Total Treatment Time: 45     Medicare/BCBS Time Tracking (below)   Total Timed Codes (min):  na 1:1 Treatment Time:  na     TREATMENT AREA =  Left knee pain [M25.562]    SUBJECTIVE  Pain Level (on 0 to 10 scale):  5   10   Medication Changes/New allergies or changes in medical history, any new surgeries or procedures?    no  If yes, update Summary List   Subjective Functional Status/Changes:  []  No changes reported     I just want this to stop hurting. But I did feel better after I left here. OBJECTIVE  Modalities Rationale:     decrease inflammation and decrease pain to improve patient's ability to perform functional mobility activities with decrease c/o symptoms. min [] Estim, type/location:                                      []  att     []  unatt     []  w/US     []  w/ice    []  w/heat    min []  Mechanical Traction: type/lbs                   []  pro   []  sup   []  int   []  cont    []  before manual    []  after manual   8 min [x]  Ultrasound, settings/location:  Pulsed 20% post/medial aspect (L) knee.   3.3 MHz    min []  Iontophoresis w/ dexamethasone, location:                                               []  take home patch       []  in clinic    min []  Ice     []  Heat    location/position:     min []  Vasopneumatic Device, press/temp:    If using vaso (only need to measure limb vaso being performed on)      pre-treatment girth :       post-treatment girth :       measured at (landmark location) :      min []  Other:    [x] Skin assessment post-treatment (if applicable):    [x]  intact    []  redness- no adverse reaction                  []redness  adverse reaction:      12 min Therapeutic Exercise:  [x] See flow sheet   Rationale:      increase ROM, increase strength, improve coordination and improve balance to improve the patients ability to perform functional mobility activities with decrease c/o symptoms. 15 min Manual Therapy: TPR post medial (L) knee, fib head mob. Rationale:      decrease pain, increase ROM and increase tissue extensibility to improve patient's ability to perform functional mobility activities with decrease c/o symptoms. The manual therapy interventions were performed at a separate and distinct time from the therapeutic activities interventions. Billed With/As:   [] TE   [] TA   [] Neuro   [] Self Care Patient Education: [x] Review HEP    [] Progressed/Changed HEP based on:   [] positioning   [] body mechanics   [] transfers   [] heat/ice application    [] other:      Other Objective/Functional Measures:    Continue with present level to therx and manual.     Post Treatment Pain Level (on 0 to 10) scale:   3  / 10     ASSESSMENT  Assessment/Changes in Function:     Good relief to symptoms at end of session. []  See Progress Note/Recertification   Patient will continue to benefit from skilled PT services to modify and progress therapeutic interventions, address functional mobility deficits, address ROM deficits, address strength deficits, analyze and address soft tissue restrictions and analyze and cue movement patterns to attain remaining goals. Progress toward goals / Updated goals: · Short Term Goals: To be accomplished in  1-2  weeks:  1. Independent with HEP. 2. Decrease max pain 25-50% to assist with amb > 20 minutes. · Long Term Goals: To be accomplished in  4-6  weeks:  1.  Decrease max pain 50-75% to assist with amb > 30 min  2.  Improve FOTO Functional Status Score by 14 points in order to show significant functional improvement.   3.  Improve tolerance to left knee flexion resistance, pain free, in order to improve ambulation tolerance       PLAN  []  Upgrade activities as tolerated yes Continue plan of care   []  Discharge due to :    []  Other:      Therapist: Dilan Thakur PTA    Date: 8/18/2021 Time: 6:17 AM     Future Appointments   Date Time Provider Jose Lucero   8/18/2021 10:00 AM [place]um [place], PTA [address]   8/20/2021  7:45 AM [place], PTA [address]   8/25/2021 10:00 AM [place], PTA [address]   8/27/2021 10:00 AM [place], PTA [facility]   8/30/2021 10:00 AM [place], PTA [facility]   9/1/2021 10:00 AM [place], PTA [facility]   9/8/2021 10:00 AM Wilfredo Davis [facility]   9/10/2021  7:45 AM Leland Salcedo, [facility] [address] [address]

## 2021-08-20 ENCOUNTER — HOSPITAL ENCOUNTER (OUTPATIENT)
Dept: PHYSICAL THERAPY | Age: 61
End: 2021-08-20
Payer: OTHER GOVERNMENT

## 2021-08-25 ENCOUNTER — HOSPITAL ENCOUNTER (OUTPATIENT)
Dept: PHYSICAL THERAPY | Age: 61
Discharge: HOME | End: 2021-08-25
Payer: OTHER GOVERNMENT

## 2021-08-25 PROCEDURE — 97140 MANUAL THERAPY 1/> REGIONS: CPT

## 2021-08-25 PROCEDURE — 97035 APP MDLTY 1+ULTRASOUND EA 15: CPT

## 2021-08-25 PROCEDURE — 97110 THERAPEUTIC EXERCISES: CPT

## 2021-08-25 NOTE — PROGRESS NOTES
PHYSICAL THERAPY - DAILY TREATMENT NOTE    Patient Name: Diane Greer        Date: 2021  : 1960   yes Patient  Verified  Visit #:      12  Insurance: Payor:  / Plan: Phillip Farias 74 / Product Type:  /      In time: 1005 Out time: 1050   Total Treatment Time: 45     Medicare/BCBS Time Tracking (below)   Total Timed Codes (min):  na 1:1 Treatment Time:  na     TREATMENT AREA =  Left knee pain [M25.562]    SUBJECTIVE  Pain Level (on 0 to 10 scale):  0  / 10   Medication Changes/New allergies or changes in medical history, any new surgeries or procedures?    no  If yes, update Summary List   Subjective Functional Status/Changes:  []  No changes reported     I haven't done anything today so I don't have any pain right now. OBJECTIVE  Modalities Rationale:     decrease inflammation and decrease pain to improve patient's ability to perform functional mobility activities with decrease c/o symptoms.    min [] Estim, type/location:                                      []  att     []  unatt     []  w/US     []  w/ice    []  w/heat    min []  Mechanical Traction: type/lbs                   []  pro   []  sup   []  int   []  cont    []  before manual    []  after manual   8 min [x]  Ultrasound, settings/location:  Pulsed 20% post/medial aspect (L) knee.  3.3 MHz    min []  Iontophoresis w/ dexamethasone, location:                                               []  take home patch       []  in clinic    min []  Ice     []  Heat    location/position:     min []  Vasopneumatic Device, press/temp:    If using vaso (only need to measure limb vaso being performed on)      pre-treatment girth :       post-treatment girth :       measured at (landmark location) :      min []  Other:    [x] Skin assessment post-treatment (if applicable):    [x]  intact    []  redness- no adverse reaction                  []redness  adverse reaction:      27 min Therapeutic Exercise:  [x]  See flow sheet   Rationale:      increase ROM, increase strength, improve coordination and improve balance to improve the patients ability to perform functional mobility activities with decrease c/o symptoms. 15 min Manual Therapy: TPR post medial (L) knee, fib head mob. Rationale:      decrease pain, increase ROM and increase tissue extensibility to improve patient's ability to perform functional mobility activities with decrease c/o symptoms. The manual therapy interventions were performed at a separate and distinct time from the therapeutic activities interventions. Billed With/As:   [x] TE   [] TA   [] Neuro   [] Self Care Patient Education: [x] Review HEP    [] Progressed/Changed HEP based on:   [] positioning   [] body mechanics   [] transfers   [] heat/ice application    [] other:      Other Objective/Functional Measures:    Continue with all therx and manual treatments per flow sheet. Continues to be TTO along lateral portion of (L) knee,     Post Treatment Pain Level (on 0 to 10) scale:  0  / 10     ASSESSMENT  Assessment/Changes in Function:     Patient reports no pain at end of treatment. Patient states that she has not been active today like walking her dog, which is an activity that will usual cause her to have increase of pain. []  See Progress Note/Recertification   Patient will continue to benefit from skilled PT services to modify and progress therapeutic interventions, address functional mobility deficits, address ROM deficits, address strength deficits, analyze and address soft tissue restrictions and analyze and cue movement patterns to attain remaining goals. Progress toward goals / Updated goals: · Short Term Goals: To be accomplished in  1-2  weeks:  1. Independent with HEP. 2. Decrease max pain 25-50% to assist with amb > 20 minutes. · Long Term Goals:  To be accomplished in  4-6  weeks:  1.  Decrease max pain 50-75% to assist with amb > 30 min  2.  Improve FOTO Functional Status Score by 14 points in order to show significant functional improvement.   3.  Improve tolerance to left knee flexion resistance, pain free, in order to improve ambulation tolerance     PLAN  []  Upgrade activities as tolerated yes Continue plan of care   []  Discharge due to :    []  Other:      Therapist: Verna Ralph PTA    Date: 8/25/2021 Time: 6:20 AM     Future Appointments   Date Time Provider Jose Lucero   8/25/2021 10:00 AM [facility] [address]   8/30/2021 10:00 AM [facility] [address]   9/1/2021 10:00 AM Dion Vazquez PTA [facility]   9/8/2021 10:00 AM Dion Vazquez PTA [facility]   9/10/2021  7:45 AM Dion Vazquez PTA [facility]   9/13/2021  7:00 AM [facility] [address]

## 2021-08-27 ENCOUNTER — APPOINTMENT (OUTPATIENT)
Dept: PHYSICAL THERAPY | Age: 61
End: 2021-08-27
Payer: OTHER GOVERNMENT

## 2021-08-30 ENCOUNTER — HOSPITAL ENCOUNTER (OUTPATIENT)
Dept: PHYSICAL THERAPY | Age: 61
Discharge: HOME | End: 2021-08-30
Payer: OTHER GOVERNMENT

## 2021-08-30 PROCEDURE — 97110 THERAPEUTIC EXERCISES: CPT

## 2021-08-30 PROCEDURE — 97140 MANUAL THERAPY 1/> REGIONS: CPT

## 2021-08-30 PROCEDURE — 97035 APP MDLTY 1+ULTRASOUND EA 15: CPT

## 2021-08-30 NOTE — PROGRESS NOTES
PHYSICAL THERAPY - DAILY TREATMENT NOTE    Patient Name: Merline Spence        Date: 2021  : 1960   yes Patient  Verified  Visit #:     Insurance: Payor:  / Plan: Phillip Farias 74 / Product Type:  /      In time: 1005 Out time: 0148   Total Treatment Time: 60     Medicare/BCBS Time Tracking (below)   Total Timed Codes (min):  na 1:1 Treatment Time:  na     TREATMENT AREA =  Left knee pain [M25.562]    SUBJECTIVE  Pain Level (on 0 to 10 scale):  5  / 10   Medication Changes/New allergies or changes in medical history, any new surgeries or procedures?    no  If yes, update Summary List   Subjective Functional Status/Changes:  []  No changes reported     I cut my greass yesterday and my knee got so bad I had to sleep sitting up. OBJECTIVE  Modalities Rationale:     decrease inflammation and decrease pain to improve patient's ability to perform functional mobility activities with decrease c/o symptoms.    min [] Estim, type/location:                                      []  att     []  unatt     []  w/US     []  w/ice    []  w/heat    min []  Mechanical Traction: type/lbs                   []  pro   []  sup   []  int   []  cont    []  before manual    []  after manual   8 min [x]  Ultrasound, settings/location:  Pulsed 20% post/medial aspect (L) knee.  3.3 MHz    min []  Iontophoresis w/ dexamethasone, location:                                               []  take home patch       []  in clinic    min []  Ice     []  Heat    location/position:     min []  Vasopneumatic Device, press/temp:    If using vaso (only need to measure limb vaso being performed on)      pre-treatment girth :       post-treatment girth :       measured at (landmark location) :      min []  Other:    [x] Skin assessment post-treatment (if applicable):    [x]  intact    []  redness- no adverse reaction                  []redness  adverse reaction:      37 min Therapeutic Exercise:  [x] See flow sheet   Rationale:      increase ROM, increase strength, improve coordination and improve balance to improve the patients ability to perform functional mobility activities with decrease c/o symptoms. 15 min Manual Therapy: TPR post medial (L) knee, fib head mob. Rationale:      decrease pain, increase ROM and increase tissue extensibility to improve patient's ability to perform functional mobility activities with decrease c/o symptoms. The manual therapy interventions were performed at a separate and distinct time from the therapeutic activities interventions. Billed With/As:   [x] TE   [] TA   [] Neuro   [] Self Care Patient Education: [x] Review HEP    [] Progressed/Changed HEP based on:   [] positioning   [] body mechanics   [] transfers   [] heat/ice application    [] other:      Other Objective/Functional Measures:    Patient continues to have increase pain and difficulty with prolonged standing, walking and prolonged activities. Post Treatment Pain Level (on 0 to 10) scale:   2-3  / 10     ASSESSMENT  Assessment/Changes in Function:     1. Independent with HEP. 2. Decrease max pain 25-50% to assist with amb > 20 minutes. []  See Progress Note/Recertification   Patient will continue to benefit from skilled PT services to modify and progress therapeutic interventions, address functional mobility deficits, address ROM deficits, address strength deficits, analyze and address soft tissue restrictions and analyze and cue movement patterns to attain remaining goals. Progress toward goals / Updated goals: · Short Term Goals: To be accomplished in  1-2  weeks:  1. Independent with HEP. 2. Decrease max pain 25-50% to assist with amb > 20 minutes. · Long Term Goals: To be accomplished in  4-6  weeks:  1.  Decrease max pain 50-75% to assist with amb > 30 min  2.  Improve FOTO Functional Status Score by 14 points in order to show significant functional improvement.   3.  Improve tolerance to left knee flexion resistance, pain free, in order to improve ambulation tolerance       PLAN  []  Upgrade activities as tolerated yes Continue plan of care   []  Discharge due to :    []  Other:      Therapist: Edward Hinojosa PTA    Date: 8/30/2021 Time: 6:26 AM     Future Appointments   Date Time Provider Jose Lucero   8/30/2021 10:00 AM Chandra Melbourne, [facility]   9/1/2021 10:00 AM Chandra Melbourne, [facility] [address]   9/8/2021 10:00 AM Chandra Melbourne, \[facility]\"" [address]   9/10/2021  7:45 AM Chandra Melbourne, [facility] [address]   9/13/2021  7:00 AM Chandra Melbourne, [facility] [address]   9/15/2021  7:45 AM Chandra Melbourne, [facility] [address]   9/23/2021  2:30 PM Kalie Young PA-C [facility]

## 2021-09-01 ENCOUNTER — HOSPITAL ENCOUNTER (OUTPATIENT)
Dept: PHYSICAL THERAPY | Age: 61
Discharge: HOME | End: 2021-09-01
Payer: OTHER GOVERNMENT

## 2021-09-01 PROCEDURE — 97140 MANUAL THERAPY 1/> REGIONS: CPT

## 2021-09-01 PROCEDURE — 97035 APP MDLTY 1+ULTRASOUND EA 15: CPT

## 2021-09-01 PROCEDURE — 97110 THERAPEUTIC EXERCISES: CPT

## 2021-09-01 NOTE — PROGRESS NOTES
PHYSICAL THERAPY - DAILY TREATMENT NOTE    Patient Name: Damian Browning        Date: 2021  : 1960   yes Patient  Verified  Visit #:      12  Insurance: Payor:  / Plan: Phillip Farias 74 / Product Type:  /      In time: 1000 Out time: 1100   Total Treatment Time: 60     Medicare/BCBS Time Tracking (below)   Total Timed Codes (min):  na 1:1 Treatment Time:  na     TREATMENT AREA =  Left knee pain [M25.562]    SUBJECTIVE  Pain Level (on 0 to 10 scale):  4  / 10   Medication Changes/New allergies or changes in medical history, any new surgeries or procedures?    no  If yes, update Summary List   Subjective Functional Status/Changes:  []  No changes reported     i'm still sore from last treatment session. OBJECTIVE  Modalities Rationale:     decrease inflammation and decrease pain to improve patient's ability to perform functional mobility activities with decrease c/o symptoms.    min [] Estim, type/location:                                      []  att     []  unatt     []  w/US     []  w/ice    []  w/heat    min []  Mechanical Traction: type/lbs                   []  pro   []  sup   []  int   []  cont    []  before manual    []  after manual   8 min [x]  Ultrasound, settings/location:  Pulsed 20% post/medial aspect (L) knee.  3.3 MHz    min []  Iontophoresis w/ dexamethasone, location:                                               []  take home patch       []  in clinic    min []  Ice     []  Heat    location/position:     min []  Vasopneumatic Device, press/temp:    If using vaso (only need to measure limb vaso being performed on)      pre-treatment girth :       post-treatment girth :       measured at (landmark location) :      min []  Other:    [x] Skin assessment post-treatment (if applicable):    [x]  intact    []  redness- no adverse reaction                  []redness  adverse reaction:      37 min Therapeutic Exercise:  [x]  See flow sheet   Rationale: increase ROM, increase strength, improve coordination and improve balance to improve the patients ability to perform functional mobility activities with decrease c/o symptoms. 15 min Manual Therapy: TPR post medial (L) knee, DTM gastroc   Rationale:      decrease pain, increase ROM and increase tissue extensibility to improve patient's ability to perform functional mobility activities with decrease c/o symptoms. The manual therapy interventions were performed at a separate and distinct time from the therapeutic activities interventions. Billed With/As:   [x] TE   [] TA   [] Neuro   [] Self Care Patient Education: [x] Review HEP    [] Progressed/Changed HEP based on:   [] positioning   [] body mechanics   [] transfers   [] heat/ice application    [] other:      Other Objective/Functional Measures:    Continue with all therapeutic interventions. Post Treatment Pain Level (on 0 to 10) scale:   0  / 10     ASSESSMENT  Assessment/Changes in Function:     Continues to report pain with prolonged standing activities. Patient to see pain management specialist this coming Friday to discuss other interventions. []  See Progress Note/Recertification   Patient will continue to benefit from skilled PT services to modify and progress therapeutic interventions, address functional mobility deficits, address ROM deficits, address strength deficits, analyze and address soft tissue restrictions and analyze and cue movement patterns to attain remaining goals. Progress toward goals / Updated goals: · Short Term Goals: To be accomplished in  1-2  weeks:  1. Independent with HEP. Achieved 9/1/21  2. Decrease max pain 25-50% to assist with amb > 20 minutes. Slow progression 9/1/21  · Long Term Goals: To be accomplished in  4-6  weeks:  1.  Decrease max pain 50-75% to assist with amb > 30 min  2.  Improve FOTO Functional Status Score by 14 points in order to show significant functional improvement.   3.  Improve tolerance to left knee flexion resistance, pain free, in order to improve ambulation tolerance     PLAN  []  Upgrade activities as tolerated yes Continue plan of care   []  Discharge due to :    []  Other:      Therapist: Edward Hinojosa PTA    Date: 9/1/2021 Time: 6:23 AM     Future Appointments   Date Time Provider Jose Lucero   9/1/2021 10:00 AM Chandra Taylor, PTA [address]   9/8/2021 10:00 AM Chandra Taylor, PTA [address]   9/10/2021  7:45 AM Chandra Taylor, PTA [address]   9/13/2021  7:00 AM Chandra Taylor, PTA [address]   9/15/2021  7:45 AM Chandra Taylor, PTA [address]   9/23/2021  2:30 PM Kalie Young PA-C [facility]

## 2021-09-08 ENCOUNTER — HOSPITAL ENCOUNTER (OUTPATIENT)
Dept: PHYSICAL THERAPY | Age: 61
Discharge: HOME | End: 2021-09-08
Payer: OTHER GOVERNMENT

## 2021-09-08 PROCEDURE — 97535 SELF CARE MNGMENT TRAINING: CPT

## 2021-09-08 NOTE — PROGRESS NOTES
PHYSICAL THERAPY - DAILY TREATMENT NOTE    Patient Name: Cory Cisneros        Date: 2021  : 1960   yes Patient  Verified  Visit #:     Insurance: Payor: DEENA / Plan: Phillip Farias 74 / Product Type:  /      In time: 1000 Out time:    Total Treatment Time: 15     Medicare/BCBS Time Tracking (below)   Total Timed Codes (min):  na 1:1 Treatment Time:  na     TREATMENT AREA =  Left knee pain [M25.562]    SUBJECTIVE  Pain Level (on 0 to 10 scale):  4  / 10   Medication Changes/New allergies or changes in medical history, any new surgeries or procedures?    no  If yes, update Summary List   Subjective Functional Status/Changes:  []  No changes reported     Patient reports that pain management will not deal with it and that I have to go through my orthopedic doctor. OBJECTIVE      15 min Self Care: See note   Rationale:      decrease pain, increase ROM and increase tissue extensibility to improve patient's ability to perform functional mobility activities with decrease c/o symptoms  The manual therapy interventions were performed at a separate and distinct time from the therapeutic activities interventions. Billed With/As:   [x] TE   [] TA   [] Neuro   [] Self Care Patient Education: [x] Review HEP    [] Progressed/Changed HEP based on:   [] positioning   [] body mechanics   [] transfers   [] heat/ice application    [] other:      Other Objective/Functional Measures: FOTO: 52    GROC: 0 no change    Patient reports ~ 0% overall improvement with performance of a general ADLs and functional mobility activities     Patient reports max pain 7/10, least pain 3/10, average pain 3/10. Post Treatment Pain Level (on 0 to 10) scale:  4 / 10     ASSESSMENT  Assessment/Changes in Function:     Sitting tolerance: no problems  Standing tolerance: no problems  Walking tolerance: < one mile secondary to increase of pain.     Patient to be placed on hold x 2 wks and to return to MD for further consultation. []  See Progress Note/Recertification   Patient will continue to benefit from skilled PT services to modify and progress therapeutic interventions, address functional mobility deficits, address ROM deficits, address strength deficits, analyze and address soft tissue restrictions and analyze and cue movement patterns to attain remaining goals. Progress toward goals / Updated goals: · Short Term Goals: To be accomplished in  1-2  weeks:  1. Independent with HEP. Achieved 9/1/21  2. Decrease max pain 25-50% to assist with amb > 20 minutes. Slow progression 9/1/21  · Long Term Goals: To be accomplished in  4-6  weeks:  1.  Decrease max pain 50-75% to assist with amb > 30 min  2.  Improve FOTO Functional Status Score by 14 points in order to show significant functional improvement.   3.  Improve tolerance to left knee flexion resistance, pain free, in order to improve ambulation tolerance     PLAN  []  Upgrade activities as tolerated yes Continue plan of care   []  Discharge due to :    []  Other:      Therapist: Pamela Douglass PTA    Date: 9/8/2021 Time: 6:25 AM     Future Appointments   Date Time Provider Jose Lucero   9/8/2021 10:00 AM Jaiden Simpers, PTA [address] [address]   9/10/2021  7:45 AM Jaiden Simpers, PTA [place] [address]   9/13/2021  7:00 AM Jaiden Simpers, PTA [place] [address]   9/15/2021  7:45 AM Jaiden Simpers, PTA [place] [address]   9/22/2021 10:00 AM Jaiden Simmarcela, PTA [facility]   9/23/2021  2:30 PM Kimberly Anna PA-C [facility]   9/24/2021  7:45 AM Jaiden Simpers, PTA [facility]   9/29/2021 10:00 AM Jaiden Simpers, PTA [facility]   10/1/2021  7:45 AM Jaiden Simpers, PTA [facility]   10/6/2021 10:45 AM Jaiden Simpers, PTA [facility]   10/13/2021  7:00 AM Jaiden Arcos, LAUR[facility] WH[facility]   10/15/2021  7:00 AM Kym Mike, PTA [address]

## 2021-09-08 NOTE — PROGRESS NOTES
[facility] THERAPY   [facility] [address],[facility], [address] - Phone: [phone]  Fax: [phone]   PROGRESS NOTE    Patient Name: Jc Green : 1960   Treatment/Medical  Diagnosis: Left knee pain [M25.562]     Referral Source: Hal Holloway MD     Date of Initial Visit: 2021 Attended Visits: 7 Missed Visits: [address] has been seen at our clinic for a total of 7 visits. Pt treatment has consisted of aerobic warm-up with stationary bike, therapeutic exercise for knee ROM, LE strengthening, hip/core strengthening, modalities prn and manual therapy (TPR post medial (L) knee, DTM gastroc)    CURRENT STATUS  Pt has had a good tolerance to physical therapy treatment. Patient reports ~ 0% overall improvement with performance of a general ADLs and functional mobility activities. GROC: 0 no change. Sitting tolerance: no problems,standing tolerance: no problems, walking tolerance: < one mile secondary to increase of pain. · Short Term Goals: To be accomplished in  1-2  weeks:  1. Independent with HEP. Achieved 21  2. Decrease max pain 25-50% to assist with amb > 20 minutes. Slow progression 21    Goal/Measure of Progress Goal Met? 1. Decrease max pain 50-75% to assist with amb > 30 min   Status at last Eval: Pt rates pain as 7/10 max, 2/10 at best Current Status: Patient reports max pain 7/10, least pain 3/10, average pain 3/10. Slow porgression   2. Improve FOTO Functional Status Score by 14 points in order to show significant functional improvement. Status at last Eval: 47 Current Status: 49 Slow progression   3. Improve tolerance to left knee flexion resistance, pain free, in order to improve ambulation tolerance   Status at last Eval: Manual muscle testing shows painful knee flexion on left, Current Status: pain with manual muscle testing.  Slow progression     New Goals to be achieved in __4__  weeks:  1. Continue with above 3 goals. RECOMMENDATIONS  Patient to be placed on hold at this time as she had made little progression with reduction of her symptoms. Patient is to return to MD for further consultation. We would like to continue treatment 2x/wk for 4weeks to progress patient further toward goals if MD requests further treatment. Please advise. If you have any questions/comments please contact us directly at [phone]. Thank you for allowing us to assist in the care of your patient. LPTA Signature: Jorge Gonzáles PTA Date: 9/8/2021   PT Signature: Melissa Castillo PT, DPT, OCS, CSCS Time: 6:49 AM     NOTE TO PHYSICIAN:  PLEASE COMPLETE THE ORDERS BELOW AND FAX TO   [facility] Physical Therapy: [phone]  If you are unable to process this request in 24 hours please contact our office: [phone]    ___ I have read the above report and request that my patient continue as recommended.   ___ I have read the above report and request that my patient continue therapy with the following changes/special instructions:_________________________________________________________   ___ I have read the above report and request that my patient be discharged from therapy.      Physician Signature:        Date:       Time:      Elenita Khalil MD

## 2021-09-10 ENCOUNTER — HOSPITAL ENCOUNTER (OUTPATIENT)
Dept: PHYSICAL THERAPY | Age: 61
End: 2021-09-10
Payer: OTHER GOVERNMENT

## 2021-09-15 ENCOUNTER — APPOINTMENT (OUTPATIENT)
Dept: PHYSICAL THERAPY | Age: 61
End: 2021-09-15
Payer: OTHER GOVERNMENT

## 2021-09-17 ENCOUNTER — APPOINTMENT (OUTPATIENT)
Dept: PHYSICAL THERAPY | Age: 61
End: 2021-09-17
Payer: OTHER GOVERNMENT

## 2021-09-22 ENCOUNTER — HOSPITAL ENCOUNTER (OUTPATIENT)
Dept: PHYSICAL THERAPY | Age: 61
End: 2021-09-22
Payer: OTHER GOVERNMENT

## 2021-09-24 ENCOUNTER — APPOINTMENT (OUTPATIENT)
Dept: PHYSICAL THERAPY | Age: 61
End: 2021-09-24
Payer: OTHER GOVERNMENT

## 2021-09-29 ENCOUNTER — APPOINTMENT (OUTPATIENT)
Dept: PHYSICAL THERAPY | Age: 61
End: 2021-09-29
Payer: OTHER GOVERNMENT

## 2021-10-01 ENCOUNTER — APPOINTMENT (OUTPATIENT)
Dept: PHYSICAL THERAPY | Age: 61
End: 2021-10-01

## 2021-10-06 ENCOUNTER — APPOINTMENT (OUTPATIENT)
Dept: PHYSICAL THERAPY | Age: 61
End: 2021-10-06

## 2021-10-13 ENCOUNTER — APPOINTMENT (OUTPATIENT)
Dept: PHYSICAL THERAPY | Age: 61
End: 2021-10-13

## 2021-10-15 ENCOUNTER — APPOINTMENT (OUTPATIENT)
Dept: PHYSICAL THERAPY | Age: 61
End: 2021-10-15

## 2021-10-15 DIAGNOSIS — M17.12 ARTHRITIS OF LEFT KNEE: ICD-10-CM

## 2021-10-15 RX ORDER — CELECOXIB 200 MG/1
CAPSULE ORAL
Qty: 60 CAPSULE | Refills: 2 | Status: SHIPPED | OUTPATIENT
Start: 2021-10-15 | End: 2022-01-13

## 2021-10-20 NOTE — PROGRESS NOTES
[facility] THERAPY   [facility] [address],[facility], [address] [facility] [address] [facility] - Phone: [phone]  Fax: [phone]   DISCHARGE NOTE    Patient Name: Risa Chakraborty : 1960   Treatment/Medical  Diagnosis: Left knee pain [M25.562]     Referral Source: Zuleika Holm MD     Date of Initial Visit: 2021 Attended Visits: 7 Missed Visits: [address] has been seen at our clinic for a total of 6 visits. Pt treatment has consisted of aerobic warm-up with stationary bike, therapeutic exercise for knee ROM, LE strengthening, hip/core strengthening, modalities prn and manual therapy (TPR post medial (L) knee, DTM gastroc)    CURRENT STATUS  Pt has had a good tolerance to physical therapy treatment. Patient reports ~ 0% overall improvement with performance of a general ADLs and functional mobility activities. GROC: 0 no change. Sitting tolerance: no problems,standing tolerance: no problems, walking tolerance: < one mile secondary to increase of pain. · Short Term Goals: To be accomplished in  1-2  weeks:  1. Independent with HEP. Achieved 21  2. Decrease max pain 25-50% to assist with amb > 20 minutes. Slow progression 21    Goal/Measure of Progress Goal Met? 1. Decrease max pain 50-75% to assist with amb > 30 min   Status at last Eval: Pt rates pain as 7/10 max, 2/10 at best Current Status: Patient reports max pain 7/10, least pain 3/10, average pain 3/10. Slow porgression   2. Improve FOTO Functional Status Score by 14 points in order to show significant functional improvement. Status at last Eval: 47 Current Status: 49 Slow progression   3. Improve tolerance to left knee flexion resistance, pain free, in order to improve ambulation tolerance   Status at last Eval: Manual muscle testing shows painful knee flexion on left, Current Status: pain with manual muscle testing.  Slow progression RECOMMENDATIONS    Patient was to return to MD for further consultation. Patient did not return to continue with further treatment and has been discharged at this time. If you have any questions/comments please contact us directly at [phone]. Thank you for allowing us to assist in the care of your patient.       JEMMA Signature: Verna Ralph PTA Date: 10/20/2021   PT Signature: Tyesha Traore PT, DPT, OCS, CSCS Time: 7:37 AM

## 2021-10-20 NOTE — PROGRESS NOTES
[facility] THERAPY   [facility] [address],[facility], [address] - Phone: [phone]  Fax: [phone]   DISCHARGE NOTE    Patient Name: Nava Briggs : 1960   Treatment/Medical  Diagnosis: Left knee pain [M25.562]     Referral Source: Sumaya Muñoz MD     Date of Initial Visit: 2021 Attended Visits: 6 Missed Visits: [address] has been seen at our clinic for a total of 6 visits. Pt treatment has consisted of aerobic warm-up with stationary bike, therapeutic exercise for knee ROM, LE strengthening, hip/core strengthening, modalities prn and manual therapy (TPR post medial (L) knee, DTM gastroc)    CURRENT STATUS  Pt has had a good tolerance to physical therapy treatment. Patient reports ~ 0% overall improvement with performance of a general ADLs and functional mobility activities. GROC: 0 no change. Sitting tolerance: no problems,standing tolerance: no problems, walking tolerance: < one mile secondary to increase of pain. · Short Term Goals: To be accomplished in  1-2  weeks:  1. Independent with HEP. Achieved 21  2. Decrease max pain 25-50% to assist with amb > 20 minutes. Slow progression 21    Goal/Measure of Progress Goal Met? 1. Decrease max pain 50-75% to assist with amb > 30 min   Status at last Eval: Pt rates pain as 7/10 max, 2/10 at best Current Status: Patient reports max pain 7/10, least pain 3/10, average pain 3/10. Slow porgression   2. Improve FOTO Functional Status Score by 14 points in order to show significant functional improvement. Status at last Eval: 47 Current Status: 49 Slow progression   3. Improve tolerance to left knee flexion resistance, pain free, in order to improve ambulation tolerance   Status at last Eval: Manual muscle testing shows painful knee flexion on left, Current Status: pain with manual muscle testing.  Slow progression RECOMMENDATIONS    Patient was to return to MD for further consultation. Patient did not return to continue with further treatment and has been discharged at this time. If you have any questions/comments please contact us directly at [phone]. Thank you for allowing us to assist in the care of your patient. LPTA Signature: Landon Damon PTA Date: 10/20/2021   PT Signature:  Time: 7:37 AM     NOTE TO PHYSICIAN:  PLEASE COMPLETE THE ORDERS BELOW AND FAX TO   [facility] Physical Therapy: [phone]  If you are unable to process this request in 24 hours please contact our office: [phone]    ___ I have read the above report and request that my patient continue as recommended.   ___ I have read the above report and request that my patient continue therapy with the following changes/special instructions:_________________________________________________________   ___ I have read the above report and request that my patient be discharged from therapy.      Physician Signature:        Date:       Time:      Homer Velasquez MD

## 2021-11-04 ENCOUNTER — OFFICE VISIT (OUTPATIENT)
Dept: ORTHOPEDIC SURGERY | Age: 61
End: 2021-11-04
Payer: OTHER GOVERNMENT

## 2021-11-04 VITALS
HEIGHT: 66 IN | BODY MASS INDEX: 36.03 KG/M2 | TEMPERATURE: 97.8 F | OXYGEN SATURATION: 95 % | WEIGHT: 224.2 LBS | HEART RATE: 94 BPM

## 2021-11-04 DIAGNOSIS — M25.562 LEFT KNEE PAIN, UNSPECIFIED CHRONICITY: ICD-10-CM

## 2021-11-04 DIAGNOSIS — Z96.652 STATUS POST LEFT KNEE REPLACEMENT: ICD-10-CM

## 2021-11-04 DIAGNOSIS — M17.11 PRIMARY OSTEOARTHRITIS OF RIGHT KNEE: Primary | ICD-10-CM

## 2021-11-04 PROCEDURE — 20611 DRAIN/INJ JOINT/BURSA W/US: CPT | Performed by: PHYSICIAN ASSISTANT

## 2021-11-04 PROCEDURE — 99214 OFFICE O/P EST MOD 30 MIN: CPT | Performed by: PHYSICIAN ASSISTANT

## 2021-11-04 RX ORDER — BETAMETHASONE SODIUM PHOSPHATE AND BETAMETHASONE ACETATE 3; 3 MG/ML; MG/ML
6 INJECTION, SUSPENSION INTRA-ARTICULAR; INTRALESIONAL; INTRAMUSCULAR; SOFT TISSUE ONCE
Status: COMPLETED | OUTPATIENT
Start: 2021-11-04 | End: 2021-11-04

## 2021-11-04 RX ADMIN — BETAMETHASONE SODIUM PHOSPHATE AND BETAMETHASONE ACETATE 6 MG: 3; 3 INJECTION, SUSPENSION INTRA-ARTICULAR; INTRALESIONAL; INTRAMUSCULAR; SOFT TISSUE at 14:07

## 2021-11-04 NOTE — PROGRESS NOTES
[address] [place] [address]  [phone]           Patient: Jose Luis Naranjo                MRN: [id number]       SSN: [id number]  YOB: 1960        AGE: 62 y.o. SEX: female  Body mass index is 36.19 kg/m². PCP: Luis Sifuentes MD  11/04/21            REVIEW OF SYSTEMS:  Constitutional: Negative for fever, chills, weight loss and malaise/fatigue. HENT: Negative. Eyes: Negative. Respiratory: Negative. Cardiovascular: Negative. Gastrointestinal: No bowel incontinence or constipation. Genitourinary: No bladder incontinence or saddle anesthesia. Skin: Negative. Neurological: Negative. Endo/Heme/Allergies: Negative. Psychiatric/Behavioral: Negative. Musculoskeletal: As per HPI above. Past Medical History:   Diagnosis Date    Asthma     Cancer ([facility] [address]     colon cancer    [facility]     Hypertension          Current Outpatient Medications:     celecoxib (CELEBREX) 200 mg capsule, TAKE 1 CAPSULE BY MOUTH TWICE DAILY, Disp: 60 Capsule, Rfl: 2    diclofenac (VOLTAREN) 1 % gel, Apply 4 g to affected area four (4) times daily. , Disp: 100 g, Rfl: 4    rosuvastatin (CRESTOR) 10 mg tablet, Take 10 mg by mouth nightly., Disp: , Rfl:     multivit-min/ferrous fumarate (MULTI VITAMIN PO), Take 1 Tab by mouth daily. , Disp: , Rfl:     aspirin (ASPIRIN) 325 mg tablet, Take 1 Tab by mouth two (2) times a day., Disp: 60 Tab, Rfl: 0    empagliflozin (JARDIANCE PO), Take 25 mg by mouth daily. , Disp: , Rfl:     metFORMIN (GLUCOPHAGE) 1,000 mg tablet, Take 1,000 mg by mouth two (2) times daily (with meals). , Disp: , Rfl:     dulaglutide (TRULICITY) 1.5 UO/0.2 mL sub-q pen, 1.5 mg by SubCUTAneous route every seven (7) days. , Disp: , Rfl:     LORazepam (Ativan) 1 mg tablet, 1 MG, 1 HOUR BEFORE MRI. REPEAT 30 MINUTES BEFORE MRI PRN FOR ANXIETY.   Indications: anxious (Patient not taking: Reported on 7/29/2021), Disp: 2 Tab, Rfl: 0    amoxicillin (AMOXIL) 500 mg capsule, 4 PO 1 hour before dental appointment (Patient not taking: Reported on 7/29/2021), Disp: 4 Cap, Rfl: 3    diclofenac (VOLTAREN) 1 % gel, Apply 4 g to affected area four (4) times daily. (Patient not taking: Reported on 7/29/2021), Disp: 100 g, Rfl: 4    cephALEXin (Keflex) 500 mg capsule, Take 1 Cap by mouth four (4) times daily. (Patient not taking: Reported on 7/29/2021), Disp: 8 Cap, Rfl: 0    ergocalciferol (VITAMIN D2) 50,000 unit capsule, Take 50,000 Units by mouth. (Patient not taking: Reported on 7/29/2021), Disp: , Rfl:     No Known Allergies    Social History     Socioeconomic History    Marital status: SINGLE     Spouse name: Not on file    Number of children: Not on file    Years of education: Not on file    Highest education level: Not on file   Occupational History    Not on file   Tobacco Use    Smoking status: Former Smoker    Smokeless tobacco: Never Used   Substance and Sexual Activity    Alcohol use: Not Currently    Drug use: Never    Sexual activity: Not on file   Other Topics Concern    Not on file   Social History Narrative    Not on file     Social Determinants of Health     Financial Resource Strain:     Difficulty of Paying Living Expenses: Not on file   Food Insecurity:     Worried About [address] in the Last Year: Not on file    Haley of Food in the Last Year: Not on file   Transportation Needs:     Lack of Transportation (Medical): Not on file    Lack of Transportation (Non-Medical):  Not on file   Physical Activity:     Days of Exercise per Week: Not on file    Minutes of Exercise per Session: Not on file   Stress:     Feeling of Stress : Not on file   Social Connections:     Frequency of Communication with Friends and Family: Not on file    Frequency of Social Gatherings with Friends and Family: Not on file    Attends Buddhist Services: Not on file   [employer] Clubs or Organizations: Not on file    Attends Club or Organization Meetings: Not on file    Marital Status: Not on file   Intimate Partner Violence:     Fear of Current or Ex-Partner: Not on file    Emotionally Abused: Not on file    Physically Abused: Not on file    Sexually Abused: Not on file   Housing Stability:     Unable to Pay for Housing in the Last Year: Not on file    Number of [place] in the Last Year: Not on file    Unstable Housing in the Last Year: Not on file       Past Surgical History:   Procedure Laterality Date    HX GYN      c section    HX KNEE ARTHROSCOPY Right     ankle, fx     HX ORTHOPAEDIC Bilateral     minicus repair     HX OTHER SURGICAL  2003    screw in right ankle       Patient seen evaluate today for bilateral knees. She is now approximately a year and a half status post left total knee replacement surgery. Overall she is done well however still has discomfort on the lateral aspect of her posterior knee. She has trouble with stairs. There is no giving way or locking. Does have some discomfort with ambulation. She has been worked up for infection and fortunately negative. She is try anti-inflammatories as well as a cortisone injection which did not help her much. She has had a lot of discomfort in her right knee and she does have known advanced osteoarthritis. She has decreased walking tolerance. She has trouble stairs. She has pain at night. Patient denies recent fevers, chills, chest pain, SOB, or injuries. No recent systemic changes noted. A 12-point review of systems is performed today. Pertinent positives are noted. All other systems reviewed and otherwise are negative. Physical exam: General: Alert and oriented x3, nad.  well-developed, well nourished. normal affect, AF. NC/AT, EOMI, neck supple, trachea midline, no JVD present. Breathing is non-labored. Examination of the lower extremity was pain-free range of the hips.  There is no pain to palpation trochanter bursa. Neck straight leg raise. Negative calf tenderness. Negative Homans. No signs of DVT present. The left knee reveals skin intact. There is no erythema, ecchymosis or warmth noted. There are no signs of infection or cellulitis present. She does have some discomfort to palpation to the semitendinosis laterally and discomfort with resisted knee flexion. The right knee reveals skin intact. There is no erythema or ecchymosis noted. There are no signs of infection or cellulitis present. She does have findings consistent with advanced arthritis of the right knee. Assessment: #1 status post left knee replacement with hamstring tendinopathy, #2 right knee advanced osteoarthritis    Plan: At this point, we discussed treatment options. We will move for the cortisone injection for the right knee. After informed consent, under aseptic conditions, with US guided assitance, the right knee was prepped with betadine and a mxiture of 3ml 1% lidocaine and 6mg of celestone was injected without complications. The patient tolerated the injection well. The patient is instructed on post-injection care. We discussed surgical invention and she declines at this point. We will move forward with a [facility] MRI of the left knee for further evaluation of the lateral hamstring tendon to check for defects. We will see her back afterwards for review. Chart reviewed for the following:  Racquel FORTE PA-C, have reviewed the History, Physical and updated the Allergic reactions for [place]?     TIME OUT performed immediately prior to start of procedure:  Racquel FORTE PA-C, have performed the following reviews on [place] prior to the start of the procedure:  ????????  * Patient was identified by name and date of birth   * Agreement on procedure being performed was verified  * Risks and Benefits explained to the patient  * Procedure site verified and marked as necessary  * Patient was positioned for comfort  * Consent was signed and verified    Time:2:06 PM    Body part: right knee, intra-articular    Medication & Dose: 3ml 1% lidocaine and 6mg celestone    Date of procedure: 11/04/21    Procedure performed by: Lino Ochoa PA-C    Provider assisted by: none    Patient assisted by: self    How tolerated by patient: tolerated the procedure well with no complications    Post Procedural Pain Scale: 5    Comments:   [address] using a frequency of 10MHz with a 12L-RS transducer head was used to confirm needle placement.   Ultrasound images captured using [address] Ultrasound machine and scanned into patient's chart       Jg Sandy PA-C, ATC

## 2021-11-16 ENCOUNTER — HOSPITAL ENCOUNTER (OUTPATIENT)
Age: 61
Discharge: HOME | End: 2021-11-16
Attending: PHYSICIAN ASSISTANT
Payer: OTHER GOVERNMENT

## 2021-11-16 DIAGNOSIS — M25.562 LEFT KNEE PAIN, UNSPECIFIED CHRONICITY: ICD-10-CM

## 2021-11-16 DIAGNOSIS — Z96.652 STATUS POST LEFT KNEE REPLACEMENT: ICD-10-CM

## 2021-11-16 PROCEDURE — 73721 MRI JNT OF LWR EXTRE W/O DYE: CPT

## 2022-01-13 DIAGNOSIS — M17.12 ARTHRITIS OF LEFT KNEE: ICD-10-CM

## 2022-01-13 RX ORDER — CELECOXIB 200 MG/1
CAPSULE ORAL
Qty: 60 CAPSULE | Refills: 2 | Status: SHIPPED | OUTPATIENT
Start: 2022-01-13 | End: 2022-04-18

## 2022-01-27 ENCOUNTER — OFFICE VISIT (OUTPATIENT)
Dept: ORTHOPEDIC SURGERY | Age: 62
End: 2022-01-27
Payer: OTHER GOVERNMENT

## 2022-01-27 VITALS
TEMPERATURE: 97.5 F | WEIGHT: 224 LBS | HEIGHT: 66 IN | OXYGEN SATURATION: 98 % | HEART RATE: 85 BPM | BODY MASS INDEX: 36 KG/M2

## 2022-01-27 DIAGNOSIS — M17.11 PRIMARY OSTEOARTHRITIS OF RIGHT KNEE: Primary | ICD-10-CM

## 2022-01-27 PROCEDURE — 73564 X-RAY EXAM KNEE 4 OR MORE: CPT | Performed by: PHYSICIAN ASSISTANT

## 2022-01-27 PROCEDURE — 20611 DRAIN/INJ JOINT/BURSA W/US: CPT | Performed by: PHYSICIAN ASSISTANT

## 2022-01-27 PROCEDURE — 99214 OFFICE O/P EST MOD 30 MIN: CPT | Performed by: PHYSICIAN ASSISTANT

## 2022-01-27 RX ORDER — BETAMETHASONE SODIUM PHOSPHATE AND BETAMETHASONE ACETATE 3; 3 MG/ML; MG/ML
6 INJECTION, SUSPENSION INTRA-ARTICULAR; INTRALESIONAL; INTRAMUSCULAR; SOFT TISSUE ONCE
Status: COMPLETED | OUTPATIENT
Start: 2022-01-27 | End: 2022-01-27

## 2022-01-27 RX ADMIN — BETAMETHASONE SODIUM PHOSPHATE AND BETAMETHASONE ACETATE 6 MG: 3; 3 INJECTION, SUSPENSION INTRA-ARTICULAR; INTRALESIONAL; INTRAMUSCULAR; SOFT TISSUE at 13:14

## 2022-01-27 NOTE — PROGRESS NOTES
[address] [place] [address]  [phone]           Patient: Sunshine Santiago                MRN: [id number]       SSN: [id number]  YOB: 1960        AGE: 62 y.o. SEX: female  Body mass index is 36.15 kg/m². PCP: Marysol Fegn MD  01/27/22            REVIEW OF SYSTEMS:  Constitutional: Negative for fever, chills, weight loss and malaise/fatigue. HENT: Negative. Eyes: Negative. Respiratory: Negative. Cardiovascular: Negative. Gastrointestinal: No bowel incontinence or constipation. Genitourinary: No bladder incontinence or saddle anesthesia. Skin: Negative. Neurological: Negative. Endo/Heme/Allergies: Negative. Psychiatric/Behavioral: Negative. Musculoskeletal: As per HPI above. Past Medical History:   Diagnosis Date    Asthma     Cancer ([facility] [address]     colon cancer    Diabetes ([facility] [address]     Hypertension          Current Outpatient Medications:     celecoxib (CELEBREX) 200 mg capsule, TAKE 1 CAPSULE BY MOUTH TWICE DAILY, Disp: 60 Capsule, Rfl: 2    diclofenac (VOLTAREN) 1 % gel, Apply 4 g to affected area four (4) times daily. , Disp: 100 g, Rfl: 4    LORazepam (Ativan) 1 mg tablet, 1 MG, 1 HOUR BEFORE MRI. REPEAT 30 MINUTES BEFORE MRI PRN FOR ANXIETY. Indications: anxious, Disp: 2 Tab, Rfl: 0    rosuvastatin (CRESTOR) 10 mg tablet, Take 10 mg by mouth nightly., Disp: , Rfl:     multivit-min/ferrous fumarate (MULTI VITAMIN PO), Take 1 Tab by mouth daily. , Disp: , Rfl:     empagliflozin (JARDIANCE PO), Take 25 mg by mouth daily. , Disp: , Rfl:     metFORMIN (GLUCOPHAGE) 1,000 mg tablet, Take 1,000 mg by mouth two (2) times daily (with meals). , Disp: , Rfl:     dulaglutide (TRULICITY) 1.5 SP/0.4 mL sub-q pen, 1.5 mg by SubCUTAneous route every seven (7) days. , Disp: , Rfl:     ergocalciferol (VITAMIN D2) 50,000 unit capsule, Take 50,000 Units by mouth., Disp: , Rfl:     amoxicillin (AMOXIL) 500 mg capsule, 4 PO 1 hour before dental appointment (Patient not taking: Reported on 7/29/2021), Disp: 4 Cap, Rfl: 3    diclofenac (VOLTAREN) 1 % gel, Apply 4 g to affected area four (4) times daily. (Patient not taking: Reported on 7/29/2021), Disp: 100 g, Rfl: 4    aspirin (ASPIRIN) 325 mg tablet, Take 1 Tab by mouth two (2) times a day. (Patient not taking: Reported on 1/27/2022), Disp: 60 Tab, Rfl: 0    cephALEXin (Keflex) 500 mg capsule, Take 1 Cap by mouth four (4) times daily. (Patient not taking: Reported on 7/29/2021), Disp: 8 Cap, Rfl: 0    No Known Allergies    Social History     Socioeconomic History    Marital status: SINGLE     Spouse name: Not on file    Number of children: Not on file    Years of education: Not on file    Highest education level: Not on file   Occupational History    Not on file   Tobacco Use    Smoking status: Former Smoker    Smokeless tobacco: Never Used   Substance and Sexual Activity    Alcohol use: Not Currently    Drug use: Never    Sexual activity: Not on file   Other Topics Concern    Not on file   Social History Narrative    Not on file     Social Determinants of Health     Financial Resource Strain:     Difficulty of Paying Living Expenses: Not on file   Food Insecurity:     Worried About [address] in the Last Year: Not on file    Haley of Food in the Last Year: Not on file   Transportation Needs:     Lack of Transportation (Medical): Not on file    Lack of Transportation (Non-Medical):  Not on file   Physical Activity:     Days of Exercise per Week: Not on file    Minutes of Exercise per Session: Not on file   Stress:     Feeling of Stress : Not on file   Social Connections:     Frequency of Communication with Friends and Family: Not on file    Frequency of Social Gatherings with Friends and Family: Not on file    Attends Faith Services: Not on file    Active Member of Clubs or Organizations: Not on file    Attends Club or Organization Meetings: Not on file    Marital Status: Not on file   Intimate Partner Violence:     Fear of Current or Ex-Partner: Not on file    Emotionally Abused: Not on file    Physically Abused: Not on file    Sexually Abused: Not on file   Housing Stability:     Unable to Pay for Housing in the Last Year: Not on file    Number of [place] in the Last Year: Not on file    Unstable Housing in the Last Year: Not on file       Past Surgical History:   Procedure Laterality Date    HX GYN      c section    HX KNEE ARTHROSCOPY Right     ankle, fx     HX ORTHOPAEDIC Bilateral     minicus repair     HX OTHER SURGICAL  2003    screw in right ankle       Patient seen evaluated today for bilateral knees. She is status post left knee replacement overall doing quite well. She is happy with the results. She was having some posterior lateral corner pain which has improved. I sent her for an MRI for further evaluation. Since that time she has been having some increasing right knee pain. She was doing some hiking in [place] did well with it. She does report is at work where she twisted her knee. She does get pain on the inside part of the knee. There are some episodes where the knee wants to give way on her. No radiating pain down the lower extremities. No fevers or chills. Patient denies recent fevers, chills, chest pain, SOB, or injuries. No recent systemic changes noted. A 12-point review of systems is performed today. Pertinent positives are noted. All other systems reviewed and otherwise are negative. Physical exam: General: Alert and oriented x3, nad.  well-developed, well nourished. normal affect, AF. NC/AT, EOMI, neck supple, trachea midline, no JVD present. Breathing is non-labored. Examination of lower extremities was pain-free range of motion the hips. There is no pain to palpation trochanter bursa. Neck straight leg raise. Negative calf tenderness. Negative Homans.   No signs of DVT present. The left knee reveals skin intact. There is no erythema, ecchymosis or warmth. There are no signs of infection or cellulitis present. She has full range of motion. Excellent stability. Patella tracks nice without rubs or crepitus noted. The right knee reveals skin intact. There is no erythema or ecchymosis noted. There are no signs of infection or cellulitis present. She does have discomfort palpation to the medial joint line and discomfort Hilda's with palpable present medially. Range of motion is well-preserved. Radiographs obtained the office today 1/27/2022 at the [facility] location including AP, tunnel, lateral, skyline of the right knee shows advanced arthritis of the right knee to the medial patella from articulation. Interpretation of MRI left knee:  IMPRESSION     1.  Mild paramagnetic artifact limits evaluation, although significantly  improved compared to preceding MRI. Within these confines the bone prosthesis  interface is unremarkable. Patellar tendon is better defined, with no rupture  2. Small joint effusion    Assessment: Status post left total knee replacement doing well, right knee osteoarthritis with likely meniscal tear    Plan: At this point, we discussed treatment options. We will move forward a cortisone injection for the right knee. After informed consent, under aseptic conditions, with US guided assitance, the right knee was prepped with betadine and a mxiture of 3ml 1% lidocaine and 6mg of celestone was injected without complications. The patient tolerated the injection well. The patient is instructed on post-injection care. We will see her back in a month's time for reevaluation to  efficacy of injection. We did discuss an MRI of the right knee for further evaluation of meniscal pathology.   Given the amount of arthritis I do not think that an MRI will change the treatment as arthroscopy will not be indicated even if there is meniscal pathology with the amount of arthritis seen on plain films. We discussed surgical invention including arthroscopy as well as total knee replacements. We will monitor her for the time being. Chart reviewed for the following:  Renard FORTE PA-C, have reviewed the History, Physical and updated the Allergic reactions for Kandice Mart? TIME OUT performed immediately prior to start of procedure:  Renard FORTE PA-C, have performed the following reviews on [place]ice [place] prior to the start of the procedure:  ????????  * Patient was identified by name and date of birth   * Agreement on procedure being performed was verified  * Risks and Benefits explained to the patient  * Procedure site verified and marked as necessary  * Patient was positioned for comfort  * Consent was signed and verified    Time:1:07 PM    Body part: right knee, intra-articular    Medication & Dose: 3ml 1% lidocaine and 6mg celestone    Date of procedure: 01/27/22    Procedure performed by: Renard Castillo PA-C    Provider assisted by: none    Patient assisted by: self    How tolerated by patient: tolerated the procedure well with no complications    Post Procedural Pain Scale: 8    Comments:   [address] using a frequency of 10MHz with a 12L-RS transducer head was used to confirm needle placement.   Ultrasound images captured using [address] Ultrasound machine and scanned into patient's chart       Emilie Monroy PA-C, PREMA

## 2022-03-18 PROBLEM — M17.10 ARTHRITIS OF KNEE: Status: ACTIVE | Noted: 2020-07-27

## 2022-03-18 PROBLEM — E66.01 SEVERE OBESITY (HCC): Status: ACTIVE | Noted: 2019-11-06

## 2022-03-18 NOTE — PROGRESS NOTES
PHYSICAL THERAPY - DAILY TREATMENT NOTE    Patient Name: Robyne Hammans        Date: 2020  : 1960   YES Patient  Verified  Visit #:     Insurance: Payor: DEENA / Plan: [address] / Product Type:  /      In time: 3:00 Out time: 3:53   Total Treatment Time: 53     Medicare/BCBS Time Tracking (below)   Total Timed Codes (min):  33 1:1 Treatment Time:  33     TREATMENT AREA =  Left TKA    SUBJECTIVE    Pain Level (on 0 to 10 scale):  5  / 10   Medication Changes/New allergies or changes in medical history, any new surgeries or procedures? NO    If yes, update Summary List   Subjective Functional Status/Changes:  []  No changes reported     See POC          OBJECTIVE    20 min Therapeutic Exercise:  [x]  See flow sheet   Rationale:      increase ROM, increase strength and reduce pain to improve the patients ability to amb/stand     13 min Self Care: Reviewed diagnosis, prognosis, therapy progression   Rationale:    Improve understanding of injury and therapy to have realistic expectation of therapy to improve compliance/adherence and satisfaction    Billed With/As:   [x] TE   [] TA   [] Neuro   [x] Self Care Patient Education: [x] Review HEP    [] Progressed/Changed HEP based on:   [] positioning   [] body mechanics   [] transfers   [] heat/ice application    [] other:        Other Objective/Functional Measures:    Shown and performed HEP     Post Treatment Pain Level (on 0 to 10) scale:   5 / 10     ASSESSMENT  Assessment/Changes in Function:     See POC     []  See Progress Note/Recertification   Patient will continue to benefit from skilled PT services to modify and progress therapeutic interventions, address functional mobility deficits, address ROM deficits, address strength deficits, analyze and address soft tissue restrictions, analyze and cue movement patterns and analyze and modify body mechanics/ergonomics to attain goals per POC.    Progress toward goals / Updated goals:    See POC     PLAN  [x]  Upgrade activities as tolerated YES Continue plan of care   []  Discharge due to :    []  Other:      Therapist: Edyta Whiting PT, DPT, OCS, CSCS    Date: 8/20/2020 Time: 3:03 PM ambulatory

## 2022-03-19 PROBLEM — M46.1 SACROILIITIS, NOT ELSEWHERE CLASSIFIED (HCC): Status: ACTIVE | Noted: 2020-03-12

## 2022-04-13 DIAGNOSIS — M17.12 ARTHRITIS OF LEFT KNEE: ICD-10-CM

## 2022-04-18 RX ORDER — CELECOXIB 200 MG/1
CAPSULE ORAL
Qty: 60 CAPSULE | Refills: 2 | Status: SHIPPED | OUTPATIENT
Start: 2022-04-18

## 2022-04-18 RX ORDER — CELECOXIB 200 MG/1
CAPSULE ORAL
Qty: 60 CAPSULE | Refills: 2 | Status: SHIPPED | OUTPATIENT
Start: 2022-04-18 | End: 2022-10-17

## 2022-04-29 ENCOUNTER — OFFICE VISIT (OUTPATIENT)
Dept: ORTHOPEDIC SURGERY | Age: 62
End: 2022-04-29
Payer: OTHER GOVERNMENT

## 2022-04-29 VITALS
HEART RATE: 86 BPM | RESPIRATION RATE: 18 BRPM | WEIGHT: 219.8 LBS | TEMPERATURE: 96.9 F | OXYGEN SATURATION: 97 % | BODY MASS INDEX: 34.5 KG/M2 | HEIGHT: 67 IN

## 2022-04-29 DIAGNOSIS — M17.11 PRIMARY OSTEOARTHRITIS OF RIGHT KNEE: Primary | ICD-10-CM

## 2022-04-29 PROCEDURE — 99214 OFFICE O/P EST MOD 30 MIN: CPT | Performed by: PHYSICIAN ASSISTANT

## 2022-04-29 PROCEDURE — 20611 DRAIN/INJ JOINT/BURSA W/US: CPT | Performed by: PHYSICIAN ASSISTANT

## 2022-04-29 RX ORDER — BETAMETHASONE SODIUM PHOSPHATE AND BETAMETHASONE ACETATE 3; 3 MG/ML; MG/ML
6 INJECTION, SUSPENSION INTRA-ARTICULAR; INTRALESIONAL; INTRAMUSCULAR; SOFT TISSUE ONCE
Status: COMPLETED | OUTPATIENT
Start: 2022-04-29 | End: 2022-04-29

## 2022-04-29 RX ADMIN — BETAMETHASONE SODIUM PHOSPHATE AND BETAMETHASONE ACETATE 6 MG: 3; 3 INJECTION, SUSPENSION INTRA-ARTICULAR; INTRALESIONAL; INTRAMUSCULAR; SOFT TISSUE at 08:50

## 2022-04-29 NOTE — PROGRESS NOTES
[address] [place] [address]  [phone]           Patient: Navjot Whiting                MRN: [id number]       SSN: [id number]  YOB: 1960        AGE: 62 y.o. SEX: female  Body mass index is 34.43 kg/m². PCP: Annabel Mendez MD  04/29/22            REVIEW OF SYSTEMS:  Constitutional: Negative for fever, chills, weight loss and malaise/fatigue. HENT: Negative. Eyes: Negative. Respiratory: Negative. Cardiovascular: Negative. Gastrointestinal: No bowel incontinence or constipation. Genitourinary: No bladder incontinence or saddle anesthesia. Skin: Negative. Neurological: Negative. Endo/Heme/Allergies: Negative. Psychiatric/Behavioral: Negative. Musculoskeletal: As per HPI above. Past Medical History:   Diagnosis Date    Asthma     Cancer ([facility]     colon cancer    [facility]     Hypertension          Current Outpatient Medications:     empagliflozin (JARDIANCE PO), Take 25 mg by mouth daily. , Disp: , Rfl:     metFORMIN (GLUCOPHAGE) 1,000 mg tablet, Take 1,000 mg by mouth two (2) times daily (with meals). , Disp: , Rfl:     dulaglutide (TRULICITY) 1.5 NO/0.5 mL sub-q pen, 1.5 mg by SubCUTAneous route every seven (7) days. , Disp: , Rfl:     celecoxib (CELEBREX) 200 mg capsule, TAKE 1 CAPSULE BY MOUTH TWICE DAILY (Patient not taking: Reported on 4/29/2022), Disp: 60 Capsule, Rfl: 2    celecoxib (CELEBREX) 200 mg capsule, TAKE 1 CAPSULE BY MOUTH TWICE DAILY (Patient not taking: Reported on 4/29/2022), Disp: 60 Capsule, Rfl: 2    diclofenac (VOLTAREN) 1 % gel, Apply 4 g to affected area four (4) times daily. (Patient not taking: Reported on 4/29/2022), Disp: 100 g, Rfl: 4    LORazepam (Ativan) 1 mg tablet, 1 MG, 1 HOUR BEFORE MRI. REPEAT 30 MINUTES BEFORE MRI PRN FOR ANXIETY.   Indications: anxious, Disp: 2 Tab, Rfl: 0    amoxicillin (AMOXIL) 500 mg capsule, 4 PO 1 hour before dental appointment (Patient not taking: Reported on 7/29/2021), Disp: 4 Cap, Rfl: 3    diclofenac (VOLTAREN) 1 % gel, Apply 4 g to affected area four (4) times daily. (Patient not taking: Reported on 7/29/2021), Disp: 100 g, Rfl: 4    rosuvastatin (CRESTOR) 10 mg tablet, Take 10 mg by mouth nightly. (Patient not taking: Reported on 4/29/2022), Disp: , Rfl:     multivit-min/ferrous fumarate (MULTI VITAMIN PO), Take 1 Tab by mouth daily. (Patient not taking: Reported on 4/29/2022), Disp: , Rfl:     aspirin (ASPIRIN) 325 mg tablet, Take 1 Tab by mouth two (2) times a day. (Patient not taking: Reported on 1/27/2022), Disp: 60 Tab, Rfl: 0    cephALEXin (Keflex) 500 mg capsule, Take 1 Cap by mouth four (4) times daily. (Patient not taking: Reported on 7/29/2021), Disp: 8 Cap, Rfl: 0    ergocalciferol (VITAMIN D2) 50,000 unit capsule, Take 50,000 Units by mouth. (Patient not taking: Reported on 4/29/2022), Disp: , Rfl:     Current Facility-Administered Medications:     betamethasone (CELESTONE) injection 6 mg, 6 mg, Intra artICUlar, ONCE, Neena Bentley PA-C    No Known Allergies    Social History     Socioeconomic History    Marital status: SINGLE     Spouse name: Not on file    Number of children: Not on file    Years of education: Not on file    Highest education level: Not on file   Occupational History    Not on file   Tobacco Use    Smoking status: Former Smoker    Smokeless tobacco: Never Used   Substance and Sexual Activity    Alcohol use: Not Currently    Drug use: Never    Sexual activity: Not on file   Other Topics Concern    Not on file   Social History Narrative    Not on file     Social Determinants of Health     Financial Resource Strain:     Difficulty of Paying Living Expenses: Not on file   Food Insecurity:     Worried About Running Out of Food in the Last Year: Not on file    Haley of Food in the Last Year: Not on file   Transportation Needs:     Lack of Transportation (Medical):  Not on file    Lack of Transportation (Non-Medical): Not on file   Physical Activity:     Days of Exercise per Week: Not on file    Minutes of Exercise per Session: Not on file   Stress:     Feeling of Stress : Not on file   Social Connections:     Frequency of Communication with Friends and Family: Not on file    Frequency of Social Gatherings with Friends and Family: Not on file    Attends Holiness Services: Not on file    Active Member of [address] [place] [address] Private Practice or Organizations: Not on file    Attends Club or Organization Meetings: Not on file    Marital Status: Not on file   Intimate Partner Violence:     Fear of Current or Ex-Partner: Not on file    Emotionally Abused: Not on file    Physically Abused: Not on file    Sexually Abused: Not on file   Housing Stability:     Unable to Pay for Housing in the Last Year: Not on file    Number of [place] in the Last Year: Not on file    Unstable Housing in the Last Year: Not on file       Past Surgical History:   Procedure Laterality Date    HX GYN      c section    HX KNEE ARTHROSCOPY Right     ankle, fx     HX ORTHOPAEDIC Bilateral     minicus repair     HX OTHER SURGICAL  2003    screw in right ankle       Patient seen evaluated today for her right knee. Does have known advanced arthritis of the right knee. She has been receiving injections every 3 months or so which did give her some relief. Is not as efficacious as it once was. She does have decreased walking tolerance. She has trouble with stairs. She has trouble stairs. She has occasional night pain. Patient denies recent fevers, chills, chest pain, SOB, or injuries. No recent systemic changes noted. A 12-point review of systems is performed today. Pertinent positives are noted. All other systems reviewed and otherwise are negative. Physical exam: General: Alert and oriented x3, nad.  well-developed, well nourished. normal affect, AF.   NC/AT, EOMI, neck supple, trachea midline, no JVD present. Breathing is non-labored. Examination of the lower extremities reveals pain-free range of motion of the hips. There is no pain to palpation the trochanter bursa. Negative straight leg raise. Negative calf tenderness. Negative Homans. No signs of DVT present. Right knee reveals skin intact. There is no erythema, ecchymosis or warmth. There are no signs of infection or cellulitis present. Does have findings consistent with advanced arthritis of the right knee with pain to palpation tricompartmentally and crepitus arising the anterior compartment. Assessment: Right knee advanced osteoarthritis    Plan: At this point, we discussed treatment options. I recommended total knee replacements however the patient is not ready for surgery at this point. We will continue with conservative treatment. Today in the office we will move for the cortisone injection for the right knee. After informed consent, under aseptic conditions, with US guided assitance, the right knee was prepped with betadine and a mxiture of 3ml 1% lidocaine and 6mg of celestone was injected without complications. The patient tolerated the injection well. The patient is instructed on post-injection care. She will continue with a home exercise program and range of motion activities. We will see her back in the office in 3 months time for evaluation. Chart reviewed for the following:  Vianey FORTE PA-C, have reviewed the History, Physical and updated the Allergic reactions for Zenia Palacios?     TIME OUT performed immediately prior to start of procedure:  Vianey FORTE PA-C, have performed the following reviews on Zenia Palacios prior to the start of the procedure:  ????????  * Patient was identified by name and date of birth   * Agreement on procedure being performed was verified  * Risks and Benefits explained to the patient  * Procedure site verified and marked as necessary  * Patient was positioned for comfort  * Consent was signed and verified    Dakota Carter AM    Body part: right knee, intra-artiuclar    Medication & Dose: 3ml 1% lidocaine and 6mg celestone    Date of procedure: 04/29/22    Procedure performed by: Halima Maria PA-C    Provider assisted by: none    Patient assisted by: self    How tolerated by patient: tolerated the procedure well with no complications    Post Procedural Pain Scale: 4    Comments:   K[employer] DirectRM Loop using a frequency of 10MHz with a 12L-RS transducer head was used to confirm needle placement.   Ultrasound images captured using [address] Ultrasound machine and scanned into patient's chart       Sahil Maldonado PA-C, ATC

## 2022-07-22 ENCOUNTER — OFFICE VISIT (OUTPATIENT)
Dept: ORTHOPEDIC SURGERY | Age: 62
End: 2022-07-22
Payer: OTHER GOVERNMENT

## 2022-07-22 VITALS — TEMPERATURE: 96.9 F | WEIGHT: 221.6 LBS | HEIGHT: 67 IN | BODY MASS INDEX: 34.78 KG/M2

## 2022-07-22 DIAGNOSIS — M25.561 RIGHT KNEE PAIN, UNSPECIFIED CHRONICITY: Primary | ICD-10-CM

## 2022-07-22 DIAGNOSIS — M17.11 PRIMARY OSTEOARTHRITIS OF RIGHT KNEE: ICD-10-CM

## 2022-07-22 PROCEDURE — 20610 DRAIN/INJ JOINT/BURSA W/O US: CPT | Performed by: ORTHOPAEDIC SURGERY

## 2022-07-22 RX ORDER — BETAMETHASONE SODIUM PHOSPHATE AND BETAMETHASONE ACETATE 3; 3 MG/ML; MG/ML
6 INJECTION, SUSPENSION INTRA-ARTICULAR; INTRALESIONAL; INTRAMUSCULAR; SOFT TISSUE ONCE
Status: COMPLETED | OUTPATIENT
Start: 2022-07-22 | End: 2022-07-22

## 2022-07-22 RX ADMIN — BETAMETHASONE SODIUM PHOSPHATE AND BETAMETHASONE ACETATE 6 MG: 3; 3 INJECTION, SUSPENSION INTRA-ARTICULAR; INTRALESIONAL; INTRAMUSCULAR; SOFT TISSUE at 13:21

## 2022-07-22 NOTE — PROGRESS NOTES
Patient: Dino Moon                MRN: [id number]       SSN: [id number]  YOB: 1960        AGE: 62 y.o. SEX: female  Body mass index is 34.71 kg/m².     PCP: Annika Echols MD  07/22/22      Dear Dr. Vinay Gutierrez    The pleasure reviewing Marie Christianson in follow-up she has end-stage arthritis involving the right knee the left knee replacement is actually done well and settled down nicely she is looking towards next year for surgery she has been advised to only take Celebrex on a daily basis and we also discussed the role of cortisone injections and and their safety and efficacy in fact the American Academy of orthopedic surgeons as well researched the the topic of treating osteoarthritis and cortisone appropriate use of it Tylenol and therapy are been have some good scientific evidence supporting their use and and an M1 correctly applied do not cause damage to the soft tissues or to the bone    Having said this she reports moderate discomfort the injections really do help her but they do not last as long as she would like    Examination today she stands in varus as she has an antalgic gait owing to the right side few degree fixed flexion for when he bends fairly well about 115 degrees and Homans' sign is negative the knee replacement looks terrific previous x-rays confirm severe arthritis right knee    At this point there was a discussion regarding surgery and she would like to hold off that was her decision together we will pursue nonoperative measures for the time being which I am in agreement with right knee injected with Celestone and as per protocol and very well-tolerated we will see her back in 3 months time and I be happy to replace the knee when she would like    REVIEW OF SYSTEMS:      CON: negative  EYE: negative   ENT: negative  RESP: negative  GI:    negative   :  negative  MSK: Positive  A twelve point review of systems was completed, positives noted and all other systems were reviewed and are negative          Past Medical History:   Diagnosis Date    Asthma     Cancer ([facility]     colon cancer    Diabetes ([facility]     Hypertension        No family history on file. Current Outpatient Medications   Medication Sig Dispense Refill    rosuvastatin (CRESTOR) 10 mg tablet Take 10 mg by mouth nightly. empagliflozin (JARDIANCE PO) Take 25 mg by mouth daily. metFORMIN (GLUCOPHAGE) 1,000 mg tablet Take 1,000 mg by mouth two (2) times daily (with meals). dulaglutide (TRULICITY) 1.5 OV/7.2 mL sub-q pen 1.5 mg by SubCUTAneous route every seven (7) days. celecoxib (CELEBREX) 200 mg capsule TAKE 1 CAPSULE BY MOUTH TWICE DAILY (Patient not taking: Reported on 4/29/2022) 60 Capsule 2    celecoxib (CELEBREX) 200 mg capsule TAKE 1 CAPSULE BY MOUTH TWICE DAILY (Patient not taking: Reported on 4/29/2022) 60 Capsule 2    diclofenac (VOLTAREN) 1 % gel Apply 4 g to affected area four (4) times daily. (Patient not taking: Reported on 4/29/2022) 100 g 4    LORazepam (Ativan) 1 mg tablet 1 MG, 1 HOUR BEFORE MRI. REPEAT 30 MINUTES BEFORE MRI PRN FOR ANXIETY. Indications: anxious 2 Tab 0    amoxicillin (AMOXIL) 500 mg capsule 4 PO 1 hour before dental appointment (Patient not taking: Reported on 7/29/2021) 4 Cap 3    diclofenac (VOLTAREN) 1 % gel Apply 4 g to affected area four (4) times daily. (Patient not taking: Reported on 7/29/2021) 100 g 4    multivit-min/ferrous fumarate (MULTI VITAMIN PO) Take 1 Tab by mouth daily. (Patient not taking: Reported on 4/29/2022)      aspirin (ASPIRIN) 325 mg tablet Take 1 Tab by mouth two (2) times a day. (Patient not taking: Reported on 1/27/2022) 60 Tab 0    cephALEXin (Keflex) 500 mg capsule Take 1 Cap by mouth four (4) times daily. (Patient not taking: Reported on 7/29/2021) 8 Cap 0    ergocalciferol (VITAMIN D2) 50,000 unit capsule Take 50,000 Units by mouth.  (Patient not taking: Reported on 4/29/2022)         No Known Allergies    Past Surgical History: Procedure Laterality Date    HX GYN      c section    HX KNEE ARTHROSCOPY Right     ankle, fx     HX ORTHOPAEDIC Bilateral     minicus repair     HX OTHER SURGICAL  2003    screw in right ankle       Social History     Socioeconomic History    Marital status: SINGLE     Spouse name: Not on file    Number of children: Not on file    Years of education: Not on file    Highest education level: Not on file   Occupational History    Not on file   Tobacco Use    Smoking status: Former    Smokeless tobacco: Never   Substance and Sexual Activity    Alcohol use: Not Currently    Drug use: Never    Sexual activity: Not on file   Other Topics Concern    Not on file   Social History Narrative    Not on file     Social Determinants of Health     Financial Resource Strain: Not on file   Food Insecurity: Not on file   Transportation Needs: Not on file   Physical Activity: Not on file   Stress: Not on file   Social Connections: Not on file   Intimate Partner Violence: Not on file   Housing Stability: Not on file       Visit Vitals  Temp 96.9 °F (36.1 °C) (Temporal)   Ht 5' 7\" (1.702 m)   Wt 100.5 kg (221 lb 9.6 oz)   BMI 34.71 kg/m²         PHYSICAL EXAMINATION:  GENERAL: Alert and oriented x3, in no acute distress, well-developed, well-nourished, afebrile. HEART: No JVD. EYES: No scleral icterus   NECK: No significant lymphadenopathy   LUNGS: No respiratory compromise or indrawing  ABDOMEN: Soft, non-tender, non-distended. Note: This note was completed using voice recognition software. Any typographical/name errors or mistakes are unintentional.    Electronically signed by: MD JANN Brown Dolphus Carmine Nash Neve, M.D., have reviewed the history, physical, and have updated the allergic reactions for Johnnamarylou Kent.     TIME OUT performed immediately prior to the start of procedure:  Preston FORTE M.D., have performed the following reviews on Johnna Yoli prior to the start of the procedure:    - Patient was identified by name and date of birth  - Agreement on procedure being performed was verified  - Risks and benefits explained to the patient  - Patient was positioned for comfort  - Consent was signed and verified  - Patient was advised regarding risks of bruising, bleeding, infection and pain    Time: 1:12 PM     Body Part: intra-articular injection of right knee    Medication and Dose: 1mL Celestone preparation, i.e. 6 mg, and 3 mL 1% lidocaine    Date of Procedure: 07/22/22    PROCEDURE PERFORMED BY : Jessica Jackson M.D., [facility])    Provider Assisted by: Kathryn Gonzalez    Patient assisted by: self    Patient tolerated procedure well with no complications

## 2022-10-28 ENCOUNTER — OFFICE VISIT (OUTPATIENT)
Dept: ORTHOPEDIC SURGERY | Age: 62
End: 2022-10-28
Payer: OTHER GOVERNMENT

## 2022-10-28 VITALS — TEMPERATURE: 97.3 F | OXYGEN SATURATION: 97 % | WEIGHT: 215 LBS | BODY MASS INDEX: 33.67 KG/M2 | HEART RATE: 89 BPM

## 2022-10-28 DIAGNOSIS — M17.11 PRIMARY OSTEOARTHRITIS OF RIGHT KNEE: Primary | ICD-10-CM

## 2022-10-28 DIAGNOSIS — Z85.038 HISTORY OF COLON CANCER: ICD-10-CM

## 2022-10-28 PROCEDURE — 20610 DRAIN/INJ JOINT/BURSA W/O US: CPT | Performed by: ORTHOPAEDIC SURGERY

## 2022-10-28 RX ORDER — BETAMETHASONE SODIUM PHOSPHATE AND BETAMETHASONE ACETATE 3; 3 MG/ML; MG/ML
6 INJECTION, SUSPENSION INTRA-ARTICULAR; INTRALESIONAL; INTRAMUSCULAR; SOFT TISSUE ONCE
Status: COMPLETED | OUTPATIENT
Start: 2022-10-28 | End: 2022-10-28

## 2022-10-28 RX ADMIN — BETAMETHASONE SODIUM PHOSPHATE AND BETAMETHASONE ACETATE 6 MG: 3; 3 INJECTION, SUSPENSION INTRA-ARTICULAR; INTRALESIONAL; INTRAMUSCULAR; SOFT TISSUE at 09:11

## 2022-10-28 NOTE — PROGRESS NOTES
Patient: Pipe Munroe                MRN: [id number]       SSN: [id number]  YOB: 1960        AGE: 65 y.o. SEX: female  Body mass index is 33.67 kg/m². PCP: Martha Escoto MD  10/28/22    Edward Dudley returns reevaluation of right-sided knee pain she is about 4 months total knee replacement on the left side doing extremely well very pleased with it the right knee is driving her crazy she wants it replaced next spring pain is moderate aching sometimes radicular in nature down towards the proximal tibia night pains of future stairs are very difficult as    Partially with her grandchild. The examination today her left knee replacement looks terrific full extension bends very nicely no effusion calf nontender hips rotate nicely the right knee has a mild angular deformity couple degree fixed flexion deformity bends to 105 degrees and calf nontender no foot drop she is well-nourished well-developed BMI is at 34. Previous x-rays confirm end-stage arthritis right knee    Should like to have it replaced I be very happy to do so she is done wonderfully with the left she like an injection for the right knee and it was performed as per protocol there there is discussion regarding surgery it is recommended for the right knee we will see her back in 3 months time were very proud of her result    REVIEW OF SYSTEMS:      CON: negative  EYE: negative   ENT: negative  RESP: negative  GI:    negative   :  negative  MSK: Positive  A twelve point review of systems was completed, positives noted and all other systems were reviewed and are negative          Past Medical History:   Diagnosis Date    Asthma     Cancer ([facility] [address]     colon cancer    [facility]     Hypertension        No family history on file.     Current Outpatient Medications   Medication Sig Dispense Refill    celecoxib (CELEBREX) 200 mg capsule TAKE 1 CAPSULE BY MOUTH TWICE DAILY 60 Capsule 2    diclofenac (VOLTAREN) 1 % gel Apply 4 g to affected area four (4) times daily. 100 g 4    rosuvastatin (CRESTOR) 10 mg tablet Take 10 mg by mouth nightly. multivit-min/ferrous fumarate (MULTI VITAMIN PO) Take 1 Tablet by mouth daily. empagliflozin (JARDIANCE PO) Take 25 mg by mouth daily. metFORMIN (GLUCOPHAGE) 1,000 mg tablet Take 1,000 mg by mouth two (2) times daily (with meals). dulaglutide (TRULICITY) 1.5 OO/6.2 mL sub-q pen 1.5 mg by SubCUTAneous route every seven (7) days. celecoxib (CELEBREX) 200 mg capsule TAKE 1 CAPSULE BY MOUTH TWICE DAILY (Patient not taking: Reported on 10/28/2022) 60 Capsule 2    diclofenac (VOLTAREN) 1 % gel Apply 4 g to affected area four (4) times daily. (Patient not taking: No sig reported) 100 g 4    LORazepam (Ativan) 1 mg tablet 1 MG, 1 HOUR BEFORE MRI. REPEAT 30 MINUTES BEFORE MRI PRN FOR ANXIETY. Indications: anxious (Patient not taking: Reported on 10/28/2022) 2 Tab 0    amoxicillin (AMOXIL) 500 mg capsule 4 PO 1 hour before dental appointment (Patient not taking: No sig reported) 4 Cap 3    aspirin (ASPIRIN) 325 mg tablet Take 1 Tab by mouth two (2) times a day. (Patient not taking: Reported on 10/28/2022) 60 Tab 0    cephALEXin (Keflex) 500 mg capsule Take 1 Cap by mouth four (4) times daily. (Patient not taking: No sig reported) 8 Cap 0    ergocalciferol (ERGOCALCIFEROL) 1,250 mcg (50,000 unit) capsule Take 50,000 Units by mouth.  (Patient not taking: No sig reported)       Current Facility-Administered Medications   Medication Dose Route Frequency Provider Last Rate Last Admin    betamethasone (CELESTONE) injection 6 mg  6 mg Intra artICUlar ONCE Priscila Camarillo MD           No Known Allergies    Past Surgical History:   Procedure Laterality Date    HX GYN      c section    HX KNEE ARTHROSCOPY Right     ankle, fx     HX ORTHOPAEDIC Bilateral     minicus repair     HX OTHER SURGICAL  2003    screw in right ankle       Social History     Socioeconomic History    Marital status: SINGLE Spouse name: Not on file    Number of children: Not on file    Years of education: Not on file    Highest education level: Not on file   Occupational History    Not on file   Tobacco Use    Smoking status: Former    Smokeless tobacco: Never   Substance and Sexual Activity    Alcohol use: Not Currently    Drug use: Never    Sexual activity: Not on file   Other Topics Concern    Not on file   Social History Narrative    Not on file     Social Determinants of Health     Financial Resource Strain: Not on file   Food Insecurity: Not on file   Transportation Needs: Not on file   Physical Activity: Not on file   Stress: Not on file   Social Connections: Not on file   Intimate Partner Violence: Not on file   Housing Stability: Not on file       Visit Vitals  Pulse 89   Temp 97.3 °F (36.3 °C) (Temporal)   Wt 97.5 kg (215 lb)   SpO2 97%   BMI 33.67 kg/m²         PHYSICAL EXAMINATION:  GENERAL: Alert and oriented x3, in no acute distress, well-developed, well-nourished, afebrile. HEART: No JVD. EYES: No scleral icterus   NECK: No significant lymphadenopathy   LUNGS: No respiratory compromise or indrawing  ABDOMEN: Soft, non-tender, non-distended. Note: This note was completed using voice recognition software. Any typographical/name errors or mistakes are unintentional.    Electronically signed by: MD JANN Funes Rafael Matin Eller Reges, M.D., have reviewed the history, physical, and have updated the allergic reactions for Roxine Meo.     TIME OUT performed immediately prior to the start of procedure:  Iesha FORTE M.D., have performed the following reviews on Roxine Meo prior to the start of the procedure:    - Patient was identified by name and date of birth  - Agreement on procedure being performed was verified  - Risks and benefits explained to the patient  - Patient was positioned for comfort  - Consent was signed and verified  - Patient was advised regarding risks of bruising, bleeding, infection and pain    Time: 8:52 AM     Body Part: intra-articular injection of right knee    Medication and Dose: 1mL Celestone preparation, i.e. 6 mg, and 3 mL 1% lidocaine    Date of Procedure: 10/28/22    PROCEDURE PERFORMED BY : Shelva Spurling L. Terri Potash, M.D., [facility])    Provider Assisted by: Erika Carlos    Patient assisted by: self    Patient tolerated procedure well with no complications

## 2023-01-27 ENCOUNTER — OFFICE VISIT (OUTPATIENT)
Dept: ORTHOPEDIC SURGERY | Age: 63
End: 2023-01-27
Payer: OTHER GOVERNMENT

## 2023-01-27 VITALS — TEMPERATURE: 97.8 F | HEIGHT: 67 IN | WEIGHT: 209 LBS | RESPIRATION RATE: 16 BRPM | BODY MASS INDEX: 32.8 KG/M2

## 2023-01-27 DIAGNOSIS — M54.16 LUMBAR RADICULOPATHY: ICD-10-CM

## 2023-01-27 DIAGNOSIS — Z96.652 HISTORY OF LEFT KNEE REPLACEMENT: ICD-10-CM

## 2023-01-27 DIAGNOSIS — Z79.2 PROPHYLACTIC ANTIBIOTIC: ICD-10-CM

## 2023-01-27 DIAGNOSIS — M17.11 PRIMARY OSTEOARTHRITIS OF RIGHT KNEE: Primary | ICD-10-CM

## 2023-01-27 DIAGNOSIS — M76.892 TENDONITIS OF LEFT KNEE: ICD-10-CM

## 2023-01-27 RX ORDER — AMOXICILLIN 500 MG/1
CAPSULE ORAL
Qty: 4 CAPSULE | Refills: 0 | Status: SHIPPED | OUTPATIENT
Start: 2023-01-27

## 2023-01-27 RX ORDER — BETAMETHASONE SODIUM PHOSPHATE AND BETAMETHASONE ACETATE 3; 3 MG/ML; MG/ML
6 INJECTION, SUSPENSION INTRA-ARTICULAR; INTRALESIONAL; INTRAMUSCULAR; SOFT TISSUE ONCE
Status: COMPLETED | OUTPATIENT
Start: 2023-01-27 | End: 2023-01-27

## 2023-01-27 RX ADMIN — BETAMETHASONE SODIUM PHOSPHATE AND BETAMETHASONE ACETATE 6 MG: 3; 3 INJECTION, SUSPENSION INTRA-ARTICULAR; INTRALESIONAL; INTRAMUSCULAR; SOFT TISSUE at 09:26

## 2023-01-27 NOTE — PROGRESS NOTES
Patient: Brando Sears                MRN: [id number]       SSN: [id number]  YOB: 1960        AGE: 65 y.o. SEX: female  Body mass index is 32.73 kg/m². PCP: Kevin Trevizo MD  01/27/23      Brando Sears presents today for bilateral knee pain. Left TKR performed in 2020. She states today that she has been having trouble with her back pain which radiates down her left leg. She states that she is getting a lumbar MRI on Monday. This pain has been keeping her up at night. She denies fevers/chills at home. She states she was happy with the knee replacement 6 months ago and the left knee has been hurting for about 2 months but this started at a different time than her lumbar pain. She denies trauma/injury to the knee. We discussed that the likely cause of the left knee pain is tendonitis and I recommend icing the knee as well as using antiinflammatories. She takes Celebrex currently, 200mg 1x/day. I instructed her to take it twice a day for the next two days and then go back down to once a day. She states that she wraps the left knee while walking the dog. The examination at today she is walking well unassisted she is in varus on the right side scheduled to have surgery into the summertime the left knee is also bothering her also she is getting back pain with radiculopathy down the left leg she is actually scheduled for an MRI next week of the lumbar spine and the examination today the left knee examines quite benignly and has good motion good stability is not hot not red and only minimally swollen really no effusion and mostly tender in the quads tendon its completely intact straight leg raise is good her strength is good the straight leg raise nerve test however is positive with some radiculopathy and she has decree sensation to L5 and S1 L4 seems to be normal the right knee is in varus as well.     She like an injection for the right knee there was a discussion regarding surgery surgery is indicated for the right knee I think she just has some tendinitis involving the left knee quads tendon I recommend some Voltaren cream or Celebrex and when she returns we will obtain a lateral and skyline of the left knee. I look forward to helping her with surgery for the right knee and her I think some of her pain in the left leg and knee are attributable to the lumbar spine we will see her back in 4 weeks to check on her I think she will but this will be self-limiting with regards to the left knee    She requested antibiotics for a dental appointment and the doctor refused to give it to her. She was getting a cleaning and a crown but cancelled as she was unsure if she needed the antibiotic or not. I told her that I would not prescribe an antibiotic for the cleaning but for the crown she would likely need it. I will write the prescription today. I also recommended a temporary use of a cane. She is also scheduled for surgery on the left knee replacement and we discussed the timing of her last injection before surgery. I personally reviewed the 4-view x-rays of the right knee , obtained today, 01/27/23. Severe arthritis right knee, varus. Left knee replacement in good position and alignment although limited views. PLAN:   - Discussion regarding surgery, decided that it is not recommended at this time. - Amoxil Rx.   - Increase Celebrex temporarily   - Return after lumbar MRI     REVIEW OF SYSTEMS:      CON: negative  EYE: negative   ENT: negative  RESP: negative  GI:    negative   :  negative  MSK: Positive  A twelve point review of systems was completed, positives noted and all other systems were reviewed and are negative      IHeath M.D., have reviewed the history, physical, and have updated the allergic reactions for Waylan Mortimer.     TIME OUT performed immediately prior to the start of procedure:  IRosalia M.D., have performed the following reviews on Chandler White prior to the start of the procedure:    - Patient was identified by name and date of birth  - Agreement on procedure being performed was verified  - Risks and benefits explained to the patient  - Patient was positioned for comfort  - Consent was signed and verified  - Patient was advised regarding risks of bruising, bleeding, infection and pain    Time: 9:24 AM     Body Part: intra-articular injection of right knee    Medication and Dose: 1mL Celestone preparation, i.e. 6 mg, and 3 mL 1% lidocaine    Date of Procedure: 01/27/23    PROCEDURE PERFORMED BY : Rachel Maynard M.D., [facility])    Provider Assisted by: Shanell Beckman    Patient assisted by: self    Patient tolerated procedure well with no complications              Past Medical History:   Diagnosis Date    Asthma     Cancer [facility])     colon cancer    Diabetes ([facility] [address]     Hypertension        History reviewed. No pertinent family history. Current Outpatient Medications   Medication Sig Dispense Refill    celecoxib (CELEBREX) 200 mg capsule TAKE 1 CAPSULE BY MOUTH TWICE DAILY 60 Capsule 2    celecoxib (CELEBREX) 200 mg capsule TAKE 1 CAPSULE BY MOUTH TWICE DAILY 60 Capsule 2    diclofenac (VOLTAREN) 1 % gel Apply 4 g to affected area four (4) times daily. (Patient not taking: No sig reported) 100 g 4    LORazepam (Ativan) 1 mg tablet 1 MG, 1 HOUR BEFORE MRI. REPEAT 30 MINUTES BEFORE MRI PRN FOR ANXIETY. Indications: anxious (Patient not taking: Reported on 10/28/2022) 2 Tab 0    amoxicillin (AMOXIL) 500 mg capsule 4 PO 1 hour before dental appointment (Patient not taking: No sig reported) 4 Cap 3    diclofenac (VOLTAREN) 1 % gel Apply 4 g to affected area four (4) times daily. 100 g 4    rosuvastatin (CRESTOR) 10 mg tablet Take 10 mg by mouth nightly. multivit-min/ferrous fumarate (MULTI VITAMIN PO) Take 1 Tablet by mouth daily. aspirin (ASPIRIN) 325 mg tablet Take 1 Tab by mouth two (2) times a day.  (Patient not taking: Reported on 10/28/2022) 60 Tab 0    cephALEXin (Keflex) 500 mg capsule Take 1 Cap by mouth four (4) times daily. (Patient not taking: No sig reported) 8 Cap 0    empagliflozin (JARDIANCE PO) Take 25 mg by mouth daily. metFORMIN (GLUCOPHAGE) 1,000 mg tablet Take 1,000 mg by mouth two (2) times daily (with meals). dulaglutide (TRULICITY) 1.5 HS/7.7 mL sub-q pen 1.5 mg by SubCUTAneous route every seven (7) days. ergocalciferol (ERGOCALCIFEROL) 1,250 mcg (50,000 unit) capsule Take 50,000 Units by mouth. (Patient not taking: No sig reported)         No Known Allergies    Past Surgical History:   Procedure Laterality Date    HX GYN      c section    HX KNEE ARTHROSCOPY Right     ankle, fx     HX ORTHOPAEDIC Bilateral     minicus repair     HX OTHER SURGICAL  2003    screw in right ankle       Social History     Socioeconomic History    Marital status: SINGLE     Spouse name: Not on file    Number of children: Not on file    Years of education: Not on file    Highest education level: Not on file   Occupational History    Not on file   Tobacco Use    Smoking status: Former    Smokeless tobacco: Never   Substance and Sexual Activity    Alcohol use: Not Currently    Drug use: Never    Sexual activity: Not on file   Other Topics Concern    Not on file   Social History Narrative    Not on file     Social Determinants of Health     Financial Resource Strain: Not on file   Food Insecurity: Not on file   Transportation Needs: Not on file   Physical Activity: Not on file   Stress: Not on file   Social Connections: Not on file   Intimate Partner Violence: Not on file   Housing Stability: Not on file       Visit Vitals  Temp 97.8 °F (36.6 °C) (Temporal)   Resp 16   Ht 5' 7\" (1.702 m)   Wt 209 lb (94.8 kg)   BMI 32.73 kg/m²         PHYSICAL EXAMINATION:  GENERAL: Alert and oriented x3, in no acute distress, well-developed, well-nourished, afebrile. HEART: No JVD.   EYES: No scleral icterus   NECK: No significant lymphadenopathy   LUNGS: No respiratory compromise or indrawing  ABDOMEN: Soft, non-tender, non-distended. Note: This note was completed using voice recognition software.  Any typographical/name errors or mistakes are unintentional.    Written by: Aubrey Stephenson, as dictated by Swapnil Todd MD.    Electronically signed by: Kevin Bahena

## 2023-03-09 ENCOUNTER — OFFICE VISIT (OUTPATIENT)
Age: 63
End: 2023-03-09

## 2023-03-09 VITALS — WEIGHT: 213 LBS | BODY MASS INDEX: 33.36 KG/M2

## 2023-03-09 DIAGNOSIS — M51.36 DDD (DEGENERATIVE DISC DISEASE), LUMBAR: ICD-10-CM

## 2023-03-09 DIAGNOSIS — M48.061 FORAMINAL STENOSIS OF LUMBAR REGION: ICD-10-CM

## 2023-03-09 DIAGNOSIS — M22.8X9 PATELLA BAJA: ICD-10-CM

## 2023-03-09 DIAGNOSIS — M54.50 LUMBAR PAIN: ICD-10-CM

## 2023-03-09 DIAGNOSIS — G89.29 CHRONIC PAIN OF LEFT KNEE: Primary | ICD-10-CM

## 2023-03-09 DIAGNOSIS — M48.061 SPINAL STENOSIS OF LUMBAR REGION, UNSPECIFIED WHETHER NEUROGENIC CLAUDICATION PRESENT: ICD-10-CM

## 2023-03-09 DIAGNOSIS — M25.562 CHRONIC PAIN OF LEFT KNEE: Primary | ICD-10-CM

## 2023-03-09 NOTE — PROGRESS NOTES
Patient: Seble Aviles                MRN: [id number]       SSN:   YOB: 1960        AGE: 65 y.o. SEX: female  Body mass index is 33.36 kg/m². PCP: Halima Mike MD  03/09/23    Jo Ann De León returns reevaluation of right knee pain low back pain with radiculopathy she recently had an injection in her back its not helping very much she does get radiculopathy if she walks the dog she will have to bend over or go sit down after short period of time which is consistent with her spinal stenosis diagnosis on MRI she is looking forward to her knee replacement in July or August and we gave her an injection at the last visit in the knee which is helping she finds that the knee injections help better than the back injections that she is frustrated and the exam today there is no foot drop straight leg raise just equivocal both hips rotate nicely left knee replacement looks good right knee has findings consistent with severe arthritis 3 degree fixed flexion deformity best about 105 degrees Homans' sign is negative mild decrease sensation L4 bilaterally no foot drop. I did review the MRI with her which confirms moderately severe spinal stenosis foraminal stenosis I would recommend referral to Dr. Frida Crawford I suspect to be able to manage her nonoperatively for some time but it would not surprise me if eventually she needs an operation and like to see her back in about 4 weeks time there was a discussion regarding surgery for the knee certainly recommended for the back I will defer and my impression is is that knee and back arthritic problems and stenosis gradually worsening thank you            I have personally reviewed the 3-view x-rays of the left knee obtained today, 03/09/23. Triathlon left knee replacement appears to be in good position and alignment. Evidence of patella baja. I have personally reviewed the previous lumbar MRI, obtained 01/30/23.  Shows DDD, moderately severe central canal spinal stenosis, moderate foraminal stenosis right. REVIEW OF SYSTEMS:      CON: negative  EYE: negative   ENT: negative  RESP: negative  GI:    negative   :  negative  MSK: Positive  A twelve point review of systems was completed, positives noted and all other systems were reviewed and are negative          Past Medical History:   Diagnosis Date    Asthma     Cancer ([facility] [address]     colon cancer    [facility]     Hypertension        No family history on file. Current Outpatient Medications   Medication Sig Dispense Refill    amoxicillin (AMOXIL) 500 MG capsule 4 PO 1 hour before dental appointment      aspirin 325 MG tablet Take 325 mg by mouth 2 times daily      celecoxib (CELEBREX) 200 MG capsule TAKE 1 CAPSULE BY MOUTH TWICE DAILY      cephALEXin (KEFLEX) 500 MG capsule Take 500 mg by mouth 4 times daily      diclofenac sodium (VOLTAREN) 1 % GEL Apply 4 g topically 4 times daily      dulaglutide (TRULICITY) 1.5 DP/1.7KY SC injection Inject 1.5 mg into the skin every 7 days      ergocalciferol (ERGOCALCIFEROL) 1.25 MG (35287 UT) capsule Take 50,000 Units by mouth      LORazepam (ATIVAN) 1 MG tablet 1 MG, 1 HOUR BEFORE MRI. REPEAT 30 MINUTES BEFORE MRI PRN FOR ANXIETY. Indications: anxious      metFORMIN (GLUCOPHAGE) 1000 MG tablet Take 1,000 mg by mouth 2 times daily (with meals)      rosuvastatin (CRESTOR) 10 MG tablet Take 10 mg by mouth       No current facility-administered medications for this visit.        No Known Allergies    Past Surgical History:   Procedure Laterality Date    GYN      c section    KNEE ARTHROSCOPY Right     ankle, fx     ORTHOPEDIC SURGERY Bilateral     minicus repair     OTHER SURGICAL HISTORY  2003    screw in right ankle       Social History     Socioeconomic History    Marital status: Single     Spouse name: Not on file    Number of children: Not on file    Years of education: Not on file    Highest education level: Not on file   Occupational History    Not on file Tobacco Use    Smoking status: Former    Smokeless tobacco: Never   Substance and Sexual Activity    Alcohol use: Not Currently    Drug use: Never    Sexual activity: Not on file   Other Topics Concern    Not on file   Social History Narrative    Not on file     Social Determinants of Health     Financial Resource Strain: Not on file   Food Insecurity: Not on file   Transportation Needs: Not on file   Physical Activity: Not on file   Stress: Not on file   Social Connections: Not on file   Intimate Partner Violence: Not on file   Housing Stability: Not on file       Wt 213 lb (96.6 kg)   BMI 33.36 kg/m²       PHYSICAL EXAMINATION:  GENERAL: Alert and oriented x3, in no acute distress, well-developed, well-nourished, afebrile. HEART: No JVD. EYES: No scleral icterus   NECK: No significant lymphadenopathy   LUNGS: No respiratory compromise or indrawing  ABDOMEN: Soft, non-tender, non-distended. Note: This note was completed using voice recognition software.  Any typographical/name errors or mistakes are unintentional.    Electronically signed by: Alfonzo Perez MA

## 2023-04-17 RX ORDER — CELECOXIB 200 MG/1
CAPSULE ORAL
Qty: 60 CAPSULE | Refills: 2 | Status: SHIPPED | OUTPATIENT
Start: 2023-04-17

## 2023-05-05 ENCOUNTER — OFFICE VISIT (OUTPATIENT)
Age: 63
End: 2023-05-05

## 2023-05-05 VITALS — WEIGHT: 206 LBS | BODY MASS INDEX: 32.26 KG/M2

## 2023-05-05 DIAGNOSIS — Z96.652 PRESENCE OF LEFT ARTIFICIAL KNEE JOINT: Primary | ICD-10-CM

## 2023-05-05 DIAGNOSIS — M51.36 DDD (DEGENERATIVE DISC DISEASE), LUMBAR: ICD-10-CM

## 2023-05-05 DIAGNOSIS — M17.11 PRIMARY OSTEOARTHRITIS OF RIGHT KNEE: ICD-10-CM

## 2023-05-05 DIAGNOSIS — M22.8X9 PATELLA BAJA: ICD-10-CM

## 2023-05-05 DIAGNOSIS — G89.29 CHRONIC PAIN OF RIGHT KNEE: ICD-10-CM

## 2023-05-05 DIAGNOSIS — M25.561 CHRONIC PAIN OF RIGHT KNEE: ICD-10-CM

## 2023-05-05 DIAGNOSIS — M48.00 CENTRAL STENOSIS OF SPINAL CANAL: ICD-10-CM

## 2023-05-05 DIAGNOSIS — M48.061 FORAMINAL STENOSIS OF LUMBAR REGION: ICD-10-CM

## 2023-05-05 RX ORDER — BETAMETHASONE SODIUM PHOSPHATE AND BETAMETHASONE ACETATE 3; 3 MG/ML; MG/ML
6 INJECTION, SUSPENSION INTRA-ARTICULAR; INTRALESIONAL; INTRAMUSCULAR; SOFT TISSUE ONCE
Status: COMPLETED | OUTPATIENT
Start: 2023-05-05 | End: 2023-05-05

## 2023-05-05 RX ADMIN — BETAMETHASONE SODIUM PHOSPHATE AND BETAMETHASONE ACETATE 6 MG: 3; 3 INJECTION, SUSPENSION INTRA-ARTICULAR; INTRALESIONAL; INTRAMUSCULAR; SOFT TISSUE at 09:43

## 2023-05-05 NOTE — PROGRESS NOTES
Patient: Kvng Ernst                MRN: [id number]       SSN:   YOB: 1960        AGE: 65 y.o. SEX: female  Body mass index is 32.26 kg/m². PCP: Fito Zuleta MD  05/05/23    Is here today reevaluation of low back pain right knee pain she has had previous back surgery or back really bothers her on a daily basis usually has to sleep on the couch or recliner chair she is done well with her left knee replacement she is looking forward to a right knee replacement months coming up. I watched her walk today she has an antalgic gait owing to the right side she is in varus with a lateral thrust the low back remains tender mild neuropathy distally but no foot drop straight leg raise is just equivocal she does have an appointment at the spine center coming up in a week or 2. She is requesting a right knee injection and she does have surgery coming up for the right knee later in the summertime. The exam the hips rotate nicely low back remains tender for her and she is very pleasant the knee is in varus 4 degree fixed flexion deformity and she bends to about 100 degrees no evidence of infection or DVT    Overall impression is end-stage arthritis of the right knee we will give her 1 last injection helped by her some time and comfort prior to the knee replacement    Risks and benefits described including but not limited to infection DVT pulmonary embolism anesthetic complications blood loss requiring transfusion chronic pain instability implant longevity arthrofibrosis and also the need for bending and straightening knee on an hourly basis to avoid complications right knee injected as per protocol we will see her back for history and physical glad she is getting an opinion regarding her lumbar spine thank you                I have personally reviewed the previous 3-view x-rays of the left knee, obtained 03/09/23.  Shows Triathlon left knee replacement appears to be in good

## 2023-06-29 DIAGNOSIS — Z01.818 PRE-OP TESTING: Primary | ICD-10-CM

## 2023-06-29 DIAGNOSIS — M17.11 PRIMARY OSTEOARTHRITIS OF RIGHT KNEE: ICD-10-CM

## 2023-06-30 ENCOUNTER — CLINICAL DOCUMENTATION (OUTPATIENT)
Age: 63
End: 2023-06-30

## 2023-06-30 NOTE — PROGRESS NOTES
Patient dropped off FMLA forms for family member to be filled out.  Patient can be reached at [phone] to  forms when complete

## 2023-07-10 ENCOUNTER — CLINICAL DOCUMENTATION (OUTPATIENT)
Age: 63
End: 2023-07-10

## 2023-07-10 NOTE — PROGRESS NOTES
The [employer] disability forms completed for family member--ready at [facility] location

## 2023-07-12 RX ORDER — CELECOXIB 200 MG/1
CAPSULE ORAL
Qty: 60 CAPSULE | Refills: 2 | Status: SHIPPED | OUTPATIENT
Start: 2023-07-12

## 2023-07-17 ENCOUNTER — NURSE ONLY (OUTPATIENT)
Age: 63
End: 2023-07-17
Payer: OTHER GOVERNMENT

## 2023-07-17 ENCOUNTER — HOSPITAL ENCOUNTER (OUTPATIENT)
Facility: HOSPITAL | Age: 63
Discharge: HOME | End: 2023-07-20
Payer: OTHER GOVERNMENT

## 2023-07-17 DIAGNOSIS — Z01.818 PRE-OP TESTING: ICD-10-CM

## 2023-07-17 DIAGNOSIS — Z01.818 PRE-OP EXAM: ICD-10-CM

## 2023-07-17 DIAGNOSIS — G89.29 CHRONIC PAIN OF RIGHT KNEE: Primary | ICD-10-CM

## 2023-07-17 DIAGNOSIS — M25.561 CHRONIC PAIN OF RIGHT KNEE: Primary | ICD-10-CM

## 2023-07-17 DIAGNOSIS — M17.11 PRIMARY OSTEOARTHRITIS OF RIGHT KNEE: ICD-10-CM

## 2023-07-17 LAB
ALBUMIN SERPL-MCNC: 4.2 G/DL (ref 3.4–5)
ANION GAP SERPL CALC-SCNC: 7 MMOL/L (ref 3–18)
APPEARANCE UR: CLEAR
APTT PPP: 25.6 SEC (ref 23–36.4)
BACTERIA URNS QL MICRO: ABNORMAL /HPF
BASOPHILS # BLD: 0 K/UL (ref 0–0.1)
BASOPHILS NFR BLD: 0 % (ref 0–2)
BILIRUB UR QL: NEGATIVE
BUN SERPL-MCNC: 20 MG/DL (ref 7–18)
BUN/CREAT SERPL: 34 (ref 12–20)
CALCIUM SERPL-MCNC: 9.3 MG/DL (ref 8.5–10.1)
CHLORIDE SERPL-SCNC: 103 MMOL/L (ref 100–111)
CO2 SERPL-SCNC: 29 MMOL/L (ref 21–32)
COLOR UR: YELLOW
CREAT SERPL-MCNC: 0.58 MG/DL (ref 0.6–1.3)
DIFFERENTIAL METHOD BLD: NORMAL
EKG ATRIAL RATE: 81 BPM
EKG DIAGNOSIS: NORMAL
EKG P AXIS: 76 DEGREES
EKG P-R INTERVAL: 162 MS
EKG Q-T INTERVAL: 396 MS
EKG QRS DURATION: 118 MS
EKG QTC CALCULATION (BAZETT): 460 MS
EKG R AXIS: 74 DEGREES
EKG T AXIS: 61 DEGREES
EKG VENTRICULAR RATE: 81 BPM
EOSINOPHIL # BLD: 0.3 K/UL (ref 0–0.4)
EOSINOPHIL NFR BLD: 5 % (ref 0–5)
EPITH CASTS URNS QL MICRO: ABNORMAL /LPF (ref 0–5)
ERYTHROCYTE [DISTWIDTH] IN BLOOD BY AUTOMATED COUNT: 12.9 % (ref 11.6–14.5)
EST. AVERAGE GLUCOSE BLD GHB EST-MCNC: 143 MG/DL
GLUCOSE SERPL-MCNC: 121 MG/DL (ref 74–99)
GLUCOSE UR STRIP.AUTO-MCNC: >1000 MG/DL
HBA1C MFR BLD: 6.6 % (ref 4.2–5.6)
HCT VFR BLD AUTO: 44.9 % (ref 35–45)
HGB BLD-MCNC: 14.9 G/DL (ref 12–16)
HGB UR QL STRIP: NEGATIVE
IMM GRANULOCYTES # BLD AUTO: 0 K/UL (ref 0–0.04)
IMM GRANULOCYTES NFR BLD AUTO: 0 % (ref 0–0.5)
INR PPP: 0.9 (ref 0.8–1.2)
KETONES UR QL STRIP.AUTO: NEGATIVE MG/DL
LEUKOCYTE ESTERASE UR QL STRIP.AUTO: NEGATIVE
LYMPHOCYTES # BLD: 1.7 K/UL (ref 0.9–3.6)
LYMPHOCYTES NFR BLD: 30 % (ref 21–52)
MCH RBC QN AUTO: 30.6 PG (ref 24–34)
MCHC RBC AUTO-ENTMCNC: 33.2 G/DL (ref 31–37)
MCV RBC AUTO: 92.2 FL (ref 78–100)
MONOCYTES # BLD: 0.4 K/UL (ref 0.05–1.2)
MONOCYTES NFR BLD: 7 % (ref 3–10)
NEUTS SEG # BLD: 3.4 K/UL (ref 1.8–8)
NEUTS SEG NFR BLD: 59 % (ref 40–73)
NITRITE UR QL STRIP.AUTO: NEGATIVE
NRBC # BLD: 0 K/UL (ref 0–0.01)
NRBC BLD-RTO: 0 PER 100 WBC
PH UR STRIP: 5 (ref 5–8)
PLATELET # BLD AUTO: 341 K/UL (ref 135–420)
PMV BLD AUTO: 10.3 FL (ref 9.2–11.8)
POTASSIUM SERPL-SCNC: 4.2 MMOL/L (ref 3.5–5.5)
PROT UR STRIP-MCNC: NEGATIVE MG/DL
PROTHROMBIN TIME: 12.8 SEC (ref 11.5–15.2)
RBC # BLD AUTO: 4.87 M/UL (ref 4.2–5.3)
RBC #/AREA URNS HPF: ABNORMAL /HPF (ref 0–5)
SODIUM SERPL-SCNC: 139 MMOL/L (ref 136–145)
SP GR UR REFRACTOMETRY: >1.03 (ref 1–1.03)
UROBILINOGEN UR QL STRIP.AUTO: 0.2 EU/DL (ref 0.2–1)
WBC # BLD AUTO: 5.8 K/UL (ref 4.6–13.2)
WBC URNS QL MICRO: ABNORMAL /HPF (ref 0–4)

## 2023-07-17 PROCEDURE — 85025 COMPLETE CBC W/AUTO DIFF WBC: CPT

## 2023-07-17 PROCEDURE — 93010 ELECTROCARDIOGRAM REPORT: CPT | Performed by: INTERNAL MEDICINE

## 2023-07-17 PROCEDURE — 87077 CULTURE AEROBIC IDENTIFY: CPT

## 2023-07-17 PROCEDURE — 87186 SC STD MICRODIL/AGAR DIL: CPT

## 2023-07-17 PROCEDURE — 36415 COLL VENOUS BLD VENIPUNCTURE: CPT

## 2023-07-17 PROCEDURE — 73564 X-RAY EXAM KNEE 4 OR MORE: CPT | Performed by: PHYSICIAN ASSISTANT

## 2023-07-17 PROCEDURE — 82040 ASSAY OF SERUM ALBUMIN: CPT

## 2023-07-17 PROCEDURE — 85610 PROTHROMBIN TIME: CPT

## 2023-07-17 PROCEDURE — 83036 HEMOGLOBIN GLYCOSYLATED A1C: CPT

## 2023-07-17 PROCEDURE — 85730 THROMBOPLASTIN TIME PARTIAL: CPT

## 2023-07-17 PROCEDURE — 93005 ELECTROCARDIOGRAM TRACING: CPT

## 2023-07-17 PROCEDURE — 87086 URINE CULTURE/COLONY COUNT: CPT

## 2023-07-17 PROCEDURE — 71046 X-RAY EXAM CHEST 2 VIEWS: CPT

## 2023-07-17 PROCEDURE — 81001 URINALYSIS AUTO W/SCOPE: CPT

## 2023-07-17 PROCEDURE — 80048 BASIC METABOLIC PNL TOTAL CA: CPT

## 2023-07-19 ENCOUNTER — OFFICE VISIT (OUTPATIENT)
Age: 63
End: 2023-07-19

## 2023-07-19 VITALS
SYSTOLIC BLOOD PRESSURE: 105 MMHG | BODY MASS INDEX: 31.8 KG/M2 | DIASTOLIC BLOOD PRESSURE: 70 MMHG | OXYGEN SATURATION: 97 % | WEIGHT: 202.6 LBS | HEART RATE: 68 BPM | HEIGHT: 67 IN

## 2023-07-19 DIAGNOSIS — Z01.818 PRE-OP EXAM: Primary | ICD-10-CM

## 2023-07-19 DIAGNOSIS — M17.11 PRIMARY OSTEOARTHRITIS OF RIGHT KNEE: ICD-10-CM

## 2023-07-19 RX ORDER — ACETAMINOPHEN 325 MG/1
1000 TABLET ORAL
OUTPATIENT
Start: 2023-07-19 | End: 2023-07-19

## 2023-07-19 RX ORDER — PREGABALIN 25 MG/1
75 CAPSULE ORAL
OUTPATIENT
Start: 2023-07-19 | End: 2023-07-19

## 2023-07-19 RX ORDER — CELECOXIB 100 MG/1
200 CAPSULE ORAL
OUTPATIENT
Start: 2023-07-19 | End: 2023-07-19

## 2023-07-19 RX ORDER — NITROFURANTOIN 25; 75 MG/1; MG/1
100 CAPSULE ORAL 2 TIMES DAILY
Qty: 10 CAPSULE | Refills: 0 | Status: CANCELLED | OUTPATIENT
Start: 2023-07-19 | End: 2023-07-24

## 2023-07-20 LAB
BACTERIA SPEC CULT: ABNORMAL
CC UR VC: ABNORMAL
SERVICE CMNT-IMP: ABNORMAL

## 2023-07-20 RX ORDER — CIPROFLOXACIN 500 MG/1
500 TABLET, FILM COATED ORAL 2 TIMES DAILY
Qty: 14 TABLET | Refills: 0 | Status: SHIPPED | OUTPATIENT
Start: 2023-07-20 | End: 2023-07-27

## 2023-09-06 ENCOUNTER — OFFICE VISIT (OUTPATIENT)
Age: 63
End: 2023-09-06
Payer: OTHER GOVERNMENT

## 2023-09-06 VITALS — HEIGHT: 67 IN | BODY MASS INDEX: 31.23 KG/M2 | WEIGHT: 199 LBS

## 2023-09-06 DIAGNOSIS — M17.11 PRIMARY OSTEOARTHRITIS OF RIGHT KNEE: Primary | ICD-10-CM

## 2023-09-06 PROCEDURE — 20611 DRAIN/INJ JOINT/BURSA W/US: CPT | Performed by: PHYSICIAN ASSISTANT

## 2023-09-06 PROCEDURE — 99213 OFFICE O/P EST LOW 20 MIN: CPT | Performed by: PHYSICIAN ASSISTANT

## 2023-09-06 RX ORDER — BETAMETHASONE SODIUM PHOSPHATE AND BETAMETHASONE ACETATE 3; 3 MG/ML; MG/ML
6 INJECTION, SUSPENSION INTRA-ARTICULAR; INTRALESIONAL; INTRAMUSCULAR; SOFT TISSUE ONCE
Status: COMPLETED | OUTPATIENT
Start: 2023-09-06 | End: 2023-09-06

## 2023-09-06 RX ADMIN — BETAMETHASONE SODIUM PHOSPHATE AND BETAMETHASONE ACETATE 6 MG: 3; 3 INJECTION, SUSPENSION INTRA-ARTICULAR; INTRALESIONAL; INTRAMUSCULAR; SOFT TISSUE at 09:19

## 2023-09-06 NOTE — PROGRESS NOTES
Suzanna Chand, have reviewed the History, Physical and updated the Allergic reactions for Jordyn Candelario? TIME OUT performed immediately prior to start of procedure:  IDane PA-C, have performed the following reviews on Kanwalna Andree prior to the start of the procedure:  ????????  * Patient was identified by name and date of birth   * Agreement on procedure being performed was verified  * Risks and Benefits explained to the patient  * Procedure site verified and marked as necessary  * Patient was positioned for comfort  * Consent was signed and verified    Time:9:17 AM    Body part: right knee, intra-articular    Medication & Dose: 3ml 1% lidocaine and 6mg celestone    Date of procedure: 09/06/23    Procedure performed by: Radhika Mccullough PA-C    Provider assisted by: none    Patient assisted by: self    How tolerated by patient: tolerated the procedure well with no complications    Post Procedural Pain Scale: 4    Comments:   [address] using a frequency of 10MHz with a 12L-RS transducer head was used to confirm needle placement.   Ultrasound images captured using [address] Ultrasound machine and scanned into patient's chart       Yaa Albright PA-C, ATC

## 2023-10-24 RX ORDER — CELECOXIB 200 MG/1
CAPSULE ORAL
Qty: 60 CAPSULE | Refills: 2 | Status: SHIPPED | OUTPATIENT
Start: 2023-10-24

## 2023-12-06 ENCOUNTER — OFFICE VISIT (OUTPATIENT)
Age: 63
End: 2023-12-06
Payer: OTHER GOVERNMENT

## 2023-12-06 DIAGNOSIS — M17.11 PRIMARY OSTEOARTHRITIS OF RIGHT KNEE: Primary | ICD-10-CM

## 2023-12-06 DIAGNOSIS — R94.31 ABNORMAL EKG: ICD-10-CM

## 2023-12-06 PROCEDURE — 99214 OFFICE O/P EST MOD 30 MIN: CPT | Performed by: PHYSICIAN ASSISTANT

## 2023-12-06 PROCEDURE — 20611 DRAIN/INJ JOINT/BURSA W/US: CPT | Performed by: PHYSICIAN ASSISTANT

## 2023-12-06 RX ORDER — BETAMETHASONE SODIUM PHOSPHATE AND BETAMETHASONE ACETATE 3; 3 MG/ML; MG/ML
6 INJECTION, SUSPENSION INTRA-ARTICULAR; INTRALESIONAL; INTRAMUSCULAR; SOFT TISSUE ONCE
Status: COMPLETED | OUTPATIENT
Start: 2023-12-06 | End: 2023-12-06

## 2023-12-06 RX ADMIN — BETAMETHASONE SODIUM PHOSPHATE AND BETAMETHASONE ACETATE 6 MG: 3; 3 INJECTION, SUSPENSION INTRA-ARTICULAR; INTRALESIONAL; INTRAMUSCULAR; SOFT TISSUE at 10:47

## 2023-12-06 NOTE — PROGRESS NOTES
Patient: Ulices Ford                MRN: [id number]       SSN: [id number]  YOB: 1960        AGE: 59 y.o. SEX: female  There is no height or weight on file to calculate BMI. PCP: Surendra Greenberg MD  12/06/23            REVIEW OF SYSTEMS:  Constitutional: Negative for fever, chills, weight loss and malaise/fatigue. HENT: Negative. Eyes: Negative. Respiratory: Negative. Cardiovascular: Negative. Gastrointestinal: No bowel incontinence or constipation. Genitourinary: No bladder incontinence or saddle anesthesia. Skin: Negative. Neurological: Negative. Endo/Heme/Allergies: Negative. Psychiatric/Behavioral: Negative. Musculoskeletal: As per HPI above.      Past Medical History:   Diagnosis Date    Asthma     Cancer ([facility])     family history    Diabetes ([facility])     Hypertension     pt denies    Prolonged emergence from general anesthesia          Current Outpatient Medications:     celecoxib (CELEBREX) 200 MG capsule, TAKE 1 CAPSULE BY MOUTH TWICE DAILY, Disp: 60 capsule, Rfl: 2    JARDIANCE 25 MG tablet, , Disp: , Rfl:     ibuprofen (ADVIL) 200 MG tablet, Take 1 tablet by mouth every 6 hours as needed for Pain, Disp: , Rfl:     amoxicillin (AMOXIL) 500 MG capsule, 4 PO 1 hour before dental appointment (Patient not taking: Reported on 7/17/2023), Disp: , Rfl:     aspirin 325 MG tablet, Take 325 mg by mouth 2 times daily (Patient not taking: Reported on 7/17/2023), Disp: , Rfl:     cephALEXin (KEFLEX) 500 MG capsule, Take 500 mg by mouth 4 times daily (Patient not taking: Reported on 7/17/2023), Disp: , Rfl:     diclofenac sodium (VOLTAREN) 1 % GEL, Apply 4 g topically 4 times daily (Patient not taking: Reported on 7/17/2023), Disp: , Rfl:     dulaglutide (TRULICITY) 1.5 FF/8.1YX SC injection, Inject 1.5 mg into the skin every 7 days, Disp: , Rfl:     ergocalciferol (ERGOCALCIFEROL) 1.25 MG (67222 UT) capsule, Take 50,000 Units by mouth (Patient not taking: Reported on

## 2024-01-22 RX ORDER — CELECOXIB 200 MG/1
CAPSULE ORAL
Qty: 60 CAPSULE | Refills: 2 | Status: SHIPPED | OUTPATIENT
Start: 2024-01-22 | End: 2024-01-23

## 2024-01-23 ENCOUNTER — OFFICE VISIT (OUTPATIENT)
Age: 64
End: 2024-01-23
Payer: OTHER GOVERNMENT

## 2024-01-23 VITALS
WEIGHT: 202.8 LBS | HEART RATE: 81 BPM | SYSTOLIC BLOOD PRESSURE: 104 MMHG | HEIGHT: 67 IN | BODY MASS INDEX: 31.83 KG/M2 | OXYGEN SATURATION: 95 % | DIASTOLIC BLOOD PRESSURE: 70 MMHG

## 2024-01-23 DIAGNOSIS — E11.9 TYPE 2 DIABETES MELLITUS WITHOUT COMPLICATION, WITHOUT LONG-TERM CURRENT USE OF INSULIN (HCC): ICD-10-CM

## 2024-01-23 DIAGNOSIS — R07.9 CHEST PAIN, UNSPECIFIED TYPE: ICD-10-CM

## 2024-01-23 DIAGNOSIS — R94.31 ABNORMAL EKG: Primary | ICD-10-CM

## 2024-01-23 DIAGNOSIS — E66.9 OBESITY (BMI 30.0-34.9): ICD-10-CM

## 2024-01-23 DIAGNOSIS — E78.5 DYSLIPIDEMIA: ICD-10-CM

## 2024-01-23 DIAGNOSIS — Z01.810 PREOP CARDIOVASCULAR EXAM: ICD-10-CM

## 2024-01-23 PROCEDURE — 99204 OFFICE O/P NEW MOD 45 MIN: CPT | Performed by: INTERNAL MEDICINE

## 2024-01-23 PROCEDURE — 93000 ELECTROCARDIOGRAM COMPLETE: CPT | Performed by: INTERNAL MEDICINE

## 2024-01-23 RX ORDER — GABAPENTIN 300 MG/1
300 CAPSULE ORAL 3 TIMES DAILY
COMMUNITY

## 2024-01-23 ASSESSMENT — ENCOUNTER SYMPTOMS
RESPIRATORY NEGATIVE: 1
EYES NEGATIVE: 1
GASTROINTESTINAL NEGATIVE: 1

## 2024-01-23 NOTE — PROGRESS NOTES
Room 5     Patient brought medication list.    Where do you normally have your labs drawn? [facility]      Do you need any refills today? No      Which local pharmacy do you use?   [facility]      Which mail order pharmacy do you use?   None       Are you here for surgical clearance? No ,who will be doing your procedure/surgery (care team)?   N/A

## 2024-01-23 NOTE — PROGRESS NOTES
Diandra Gage is a 63 y.o. year old female.    HPI      Review of Systems      Physical Exam

## 2024-01-23 NOTE — PROGRESS NOTES
Diandra Gage is a 63 y.o. year old female.    1/23/2024 seen as a new patient for abnormal EKG showing right bundle branch block.  She also needs a knee surgery by Dr. Estrada for which she needs to be cleared. H/o CP with exertion like climbing steps/hills in the left precordial area without any radiation or associated symptoms.  This last for a few minutes and improves with rest.  She has not had any significant symptoms for the last 3 months since she is not very active.  Denies dyspnea edema dizziness or palpitations.          Review of Systems   Constitutional: Negative.    HENT: Negative.     Eyes: Negative.    Respiratory: Negative.     Cardiovascular:  Positive for chest pain.   Gastrointestinal: Negative.    Endocrine: Negative.    Genitourinary: Negative.    Musculoskeletal: Negative.    Neurological: Negative.    Psychiatric/Behavioral: Negative.     All other systems reviewed and are negative.        Physical Exam  Vitals and nursing note reviewed.   Constitutional:       Appearance: Normal appearance. She is obese.   HENT:      Head: Normocephalic and atraumatic.      Nose: Nose normal.   Eyes:      Conjunctiva/sclera: Conjunctivae normal.   Cardiovascular:      Rate and Rhythm: Normal rate and regular rhythm.      Pulses: Normal pulses.      Heart sounds: Normal heart sounds.   Pulmonary:      Effort: Pulmonary effort is normal.      Breath sounds: Normal breath sounds.   Abdominal:      General: Bowel sounds are normal.      Palpations: Abdomen is soft.   Musculoskeletal:         General: Normal range of motion.      Right lower leg: No edema.      Left lower leg: No edema.   Skin:     General: Skin is warm and dry.   Neurological:      General: No focal deficit present.      Mental Status: She is alert and oriented to person, place, and time.   Psychiatric:         Mood and Affect: Mood normal.         Behavior: Behavior normal.        Allergies   Allergen Reactions    Dust Mite Extract Hives

## 2024-02-07 ENCOUNTER — HOSPITAL ENCOUNTER (OUTPATIENT)
Facility: HOSPITAL | Age: 64
Discharge: HOME | End: 2024-02-10
Attending: INTERNAL MEDICINE
Payer: OTHER GOVERNMENT

## 2024-02-07 ENCOUNTER — HOSPITAL ENCOUNTER (OUTPATIENT)
Facility: HOSPITAL | Age: 64
Discharge: HOME | End: 2024-02-09
Attending: INTERNAL MEDICINE
Payer: OTHER GOVERNMENT

## 2024-02-07 VITALS
DIASTOLIC BLOOD PRESSURE: 72 MMHG | HEIGHT: 67 IN | SYSTOLIC BLOOD PRESSURE: 111 MMHG | HEART RATE: 84 BPM | WEIGHT: 155 LBS | BODY MASS INDEX: 24.33 KG/M2

## 2024-02-07 DIAGNOSIS — R07.9 CHEST PAIN, UNSPECIFIED TYPE: ICD-10-CM

## 2024-02-07 LAB
ECHO BSA: 1.82 M2
EKG DIAGNOSIS: NORMAL
NUC STRESS EJECTION FRACTION: 64 %
STRESS BASELINE DIAS BP: 74 MMHG
STRESS BASELINE HR: 82 BPM
STRESS BASELINE SYS BP: 110 MMHG
STRESS ESTIMATED WORKLOAD: 1 METS
STRESS EXERCISE DUR MIN: 4 MIN
STRESS EXERCISE DUR SEC: 0 SEC
STRESS PEAK DIAS BP: 68 MMHG
STRESS PEAK SYS BP: 124 MMHG
STRESS PERCENT HR ACHIEVED: 70 %
STRESS POST PEAK HR: 111 BPM
STRESS RATE PRESSURE PRODUCT: NORMAL BPM*MMHG
STRESS TARGET HR: 158 BPM
TID: 1.09

## 2024-02-07 PROCEDURE — 93016 CV STRESS TEST SUPVJ ONLY: CPT | Performed by: INTERNAL MEDICINE

## 2024-02-07 PROCEDURE — A9502 TC99M TETROFOSMIN: HCPCS | Performed by: INTERNAL MEDICINE

## 2024-02-07 PROCEDURE — 78452 HT MUSCLE IMAGE SPECT MULT: CPT | Performed by: INTERNAL MEDICINE

## 2024-02-07 PROCEDURE — 6360000002 HC RX W HCPCS: Performed by: INTERNAL MEDICINE

## 2024-02-07 PROCEDURE — 93017 CV STRESS TEST TRACING ONLY: CPT

## 2024-02-07 PROCEDURE — 2580000003 HC RX 258: Performed by: INTERNAL MEDICINE

## 2024-02-07 PROCEDURE — 93018 CV STRESS TEST I&R ONLY: CPT | Performed by: INTERNAL MEDICINE

## 2024-02-07 PROCEDURE — 3430000000 HC RX DIAGNOSTIC RADIOPHARMACEUTICAL: Performed by: INTERNAL MEDICINE

## 2024-02-07 RX ORDER — 0.9 % SODIUM CHLORIDE 0.9 %
250 INTRAVENOUS SOLUTION INTRAVENOUS ONCE
Status: COMPLETED | OUTPATIENT
Start: 2024-02-07 | End: 2024-02-07

## 2024-02-07 RX ORDER — REGADENOSON 0.08 MG/ML
0.4 INJECTION, SOLUTION INTRAVENOUS
Status: COMPLETED | OUTPATIENT
Start: 2024-02-07 | End: 2024-02-07

## 2024-02-07 RX ADMIN — TETROFOSMIN 11 MILLICURIE: 1.38 INJECTION, POWDER, LYOPHILIZED, FOR SOLUTION INTRAVENOUS at 07:45

## 2024-02-07 RX ADMIN — REGADENOSON 0.4 MG: 0.08 INJECTION, SOLUTION INTRAVENOUS at 10:30

## 2024-02-07 RX ADMIN — SODIUM CHLORIDE 250 ML: 900 INJECTION, SOLUTION INTRAVENOUS at 10:30

## 2024-02-07 RX ADMIN — TETROFOSMIN 33 MILLICURIE: 1.38 INJECTION, POWDER, LYOPHILIZED, FOR SOLUTION INTRAVENOUS at 10:30

## 2024-02-09 ENCOUNTER — HOSPITAL ENCOUNTER (OUTPATIENT)
Facility: HOSPITAL | Age: 64
Discharge: HOME | End: 2024-02-09
Payer: OTHER GOVERNMENT

## 2024-02-09 DIAGNOSIS — E11.9 TYPE 2 DIABETES MELLITUS WITHOUT COMPLICATION, WITHOUT LONG-TERM CURRENT USE OF INSULIN (HCC): ICD-10-CM

## 2024-02-09 DIAGNOSIS — E78.5 DYSLIPIDEMIA: ICD-10-CM

## 2024-02-09 LAB
ALBUMIN SERPL-MCNC: 4.1 G/DL (ref 3.4–5)
ALBUMIN/GLOB SERPL: 1.3 (ref 0.8–1.7)
ALP SERPL-CCNC: 59 U/L (ref 45–117)
ALT SERPL-CCNC: 16 U/L (ref 13–56)
ANION GAP SERPL CALC-SCNC: 4 MMOL/L (ref 3–18)
AST SERPL-CCNC: 10 U/L (ref 10–38)
BILIRUB DIRECT SERPL-MCNC: 0.1 MG/DL (ref 0–0.2)
BILIRUB SERPL-MCNC: 0.5 MG/DL (ref 0.2–1)
BUN SERPL-MCNC: 16 MG/DL (ref 7–18)
BUN/CREAT SERPL: 27 (ref 12–20)
CALCIUM SERPL-MCNC: 9.6 MG/DL (ref 8.5–10.1)
CHLORIDE SERPL-SCNC: 105 MMOL/L (ref 100–111)
CHOLEST SERPL-MCNC: 139 MG/DL
CO2 SERPL-SCNC: 30 MMOL/L (ref 21–32)
CREAT SERPL-MCNC: 0.6 MG/DL (ref 0.6–1.3)
GLOBULIN SER CALC-MCNC: 3.2 G/DL (ref 2–4)
GLUCOSE SERPL-MCNC: 124 MG/DL (ref 74–99)
HDLC SERPL-MCNC: 47 MG/DL (ref 40–60)
HDLC SERPL: 3 (ref 0–5)
LDLC SERPL CALC-MCNC: 67.4 MG/DL (ref 0–100)
LIPID PANEL: NORMAL
POTASSIUM SERPL-SCNC: 4.3 MMOL/L (ref 3.5–5.5)
PROT SERPL-MCNC: 7.3 G/DL (ref 6.4–8.2)
SODIUM SERPL-SCNC: 139 MMOL/L (ref 136–145)
TRIGL SERPL-MCNC: 123 MG/DL
VLDLC SERPL CALC-MCNC: 24.6 MG/DL

## 2024-02-09 PROCEDURE — 80076 HEPATIC FUNCTION PANEL: CPT

## 2024-02-09 PROCEDURE — 80048 BASIC METABOLIC PNL TOTAL CA: CPT

## 2024-02-09 PROCEDURE — 80061 LIPID PANEL: CPT

## 2024-02-09 PROCEDURE — 36415 COLL VENOUS BLD VENIPUNCTURE: CPT

## 2024-03-20 ENCOUNTER — OFFICE VISIT (OUTPATIENT)
Age: 64
End: 2024-03-20
Payer: OTHER GOVERNMENT

## 2024-03-20 VITALS — BODY MASS INDEX: 24.33 KG/M2 | HEIGHT: 67 IN | WEIGHT: 155 LBS

## 2024-03-20 DIAGNOSIS — M17.11 PRIMARY OSTEOARTHRITIS OF RIGHT KNEE: Primary | ICD-10-CM

## 2024-03-20 PROCEDURE — 99213 OFFICE O/P EST LOW 20 MIN: CPT | Performed by: PHYSICIAN ASSISTANT

## 2024-03-20 PROCEDURE — 73564 X-RAY EXAM KNEE 4 OR MORE: CPT | Performed by: PHYSICIAN ASSISTANT

## 2024-03-20 PROCEDURE — 20611 DRAIN/INJ JOINT/BURSA W/US: CPT | Performed by: PHYSICIAN ASSISTANT

## 2024-03-20 RX ORDER — LIDOCAINE HYDROCHLORIDE 10 MG/ML
3 INJECTION, SOLUTION INFILTRATION; PERINEURAL ONCE
Status: COMPLETED | OUTPATIENT
Start: 2024-03-20 | End: 2024-03-20

## 2024-03-20 RX ORDER — BETAMETHASONE SODIUM PHOSPHATE AND BETAMETHASONE ACETATE 3; 3 MG/ML; MG/ML
6 INJECTION, SUSPENSION INTRA-ARTICULAR; INTRALESIONAL; INTRAMUSCULAR; SOFT TISSUE ONCE
Status: COMPLETED | OUTPATIENT
Start: 2024-03-20 | End: 2024-03-20

## 2024-03-20 RX ADMIN — BETAMETHASONE SODIUM PHOSPHATE AND BETAMETHASONE ACETATE 6 MG: 3; 3 INJECTION, SUSPENSION INTRA-ARTICULAR; INTRALESIONAL; INTRAMUSCULAR; SOFT TISSUE at 10:23

## 2024-03-20 RX ADMIN — LIDOCAINE HYDROCHLORIDE 3 ML: 10 INJECTION, SOLUTION INFILTRATION; PERINEURAL at 10:23

## 2024-04-02 ENCOUNTER — OFFICE VISIT (OUTPATIENT)
Age: 64
End: 2024-04-02
Payer: OTHER GOVERNMENT

## 2024-04-02 VITALS
OXYGEN SATURATION: 97 % | WEIGHT: 201 LBS | HEIGHT: 67 IN | SYSTOLIC BLOOD PRESSURE: 113 MMHG | BODY MASS INDEX: 31.55 KG/M2 | HEART RATE: 84 BPM | DIASTOLIC BLOOD PRESSURE: 73 MMHG

## 2024-04-02 DIAGNOSIS — Z01.810 PREOP CARDIOVASCULAR EXAM: ICD-10-CM

## 2024-04-02 DIAGNOSIS — E66.9 OBESITY (BMI 30.0-34.9): ICD-10-CM

## 2024-04-02 DIAGNOSIS — R94.31 ABNORMAL EKG: Primary | ICD-10-CM

## 2024-04-02 DIAGNOSIS — E11.9 TYPE 2 DIABETES MELLITUS WITHOUT COMPLICATION, WITHOUT LONG-TERM CURRENT USE OF INSULIN (HCC): ICD-10-CM

## 2024-04-02 PROCEDURE — 99214 OFFICE O/P EST MOD 30 MIN: CPT | Performed by: INTERNAL MEDICINE

## 2024-04-02 RX ORDER — TIRZEPATIDE 2.5 MG/.5ML
2.5 INJECTION, SOLUTION SUBCUTANEOUS WEEKLY
COMMUNITY

## 2024-04-02 ASSESSMENT — ENCOUNTER SYMPTOMS
GASTROINTESTINAL NEGATIVE: 1
RESPIRATORY NEGATIVE: 1
EYES NEGATIVE: 1

## 2024-04-02 NOTE — PROGRESS NOTES
1. Have you been to the ER, urgent care clinic since your last visit?  Hospitalized since your last visit?     no      2.  Where do you normally have your labs drawn?       3. Do you need any refills today?   no    4. Which local pharmacy do you use (enter pharmacy)?   [facility]    5. Which mail order pharmacy do you use (enter pharmacy)?        6. Are you here for surgical clearance and if so who will be doing your     procedure/surgery (care team)?   no      
and Affect: Mood normal.         Behavior: Behavior normal.        Allergies   Allergen Reactions    Dust Mite Extract Hives       Past Medical History:   Diagnosis Date    Asthma     Diabetes (HCC)     Hyperlipidemia     Prolonged emergence from general anesthesia        Family History   Problem Relation Age of Onset    Heart Attack Mother 60    Heart Attack Sister 60    Stroke Sister 55       Social History     Tobacco Use    Smoking status: Former     Current packs/day: 0.00     Types: Cigarettes     Quit date:      Years since quittin.2     Passive exposure: Never    Smokeless tobacco: Never   Vaping Use    Vaping Use: Never used   Substance Use Topics    Alcohol use: Not Currently     Comment: quit     Drug use: Never        Current Outpatient Medications   Medication Sig Dispense Refill    Tirzepatide (MOUNJARO) 2.5 MG/0.5ML SOPN SC injection Inject 0.5 mLs into the skin once a week      gabapentin (NEURONTIN) 300 MG capsule Take 1 capsule by mouth 4 times daily.      JARDIANCE 25 MG tablet Take 1 tablet by mouth daily      ibuprofen (ADVIL) 200 MG tablet Take 1 tablet by mouth every 6 hours as needed for Pain      metFORMIN (GLUCOPHAGE) 1000 MG tablet Take 1 tablet by mouth 2 times daily (with meals)      rosuvastatin (CRESTOR) 10 MG tablet Take 1 tablet by mouth       No current facility-administered medications for this visit.        Past Surgical History:   Procedure Laterality Date    GYN      c section    KNEE ARTHROSCOPY Right     ankle, fx     ORTHOPEDIC SURGERY Bilateral     minicus repair     OTHER SURGICAL HISTORY      screw in right ankle    OTHER SURGICAL HISTORY      deviated septum repair 20 yrs ago       Vitals:    24 0834   BP: 113/73   Pulse: 84   SpO2: 97%   Weight: 91.2 kg (201 lb)   Height: 1.702 m (5' 7\")          Diagnostic Studies:  I have reviewed the relevant tests done on the patient and show as follows  EKG tracings reviewed by me today.      Ms. Gage has a

## 2024-05-21 RX ORDER — CELECOXIB 200 MG/1
CAPSULE ORAL
Qty: 60 CAPSULE | Refills: 2 | Status: SHIPPED | OUTPATIENT
Start: 2024-05-21

## 2024-06-20 ENCOUNTER — OFFICE VISIT (OUTPATIENT)
Age: 64
End: 2024-06-20
Payer: OTHER GOVERNMENT

## 2024-06-20 VITALS — BODY MASS INDEX: 30.45 KG/M2 | HEIGHT: 67 IN | WEIGHT: 194 LBS

## 2024-06-20 DIAGNOSIS — M17.11 PRIMARY OSTEOARTHRITIS OF RIGHT KNEE: Primary | ICD-10-CM

## 2024-06-20 PROCEDURE — 20611 DRAIN/INJ JOINT/BURSA W/US: CPT | Performed by: PHYSICIAN ASSISTANT

## 2024-06-20 PROCEDURE — 99214 OFFICE O/P EST MOD 30 MIN: CPT | Performed by: PHYSICIAN ASSISTANT

## 2024-06-20 RX ORDER — BETAMETHASONE SODIUM PHOSPHATE AND BETAMETHASONE ACETATE 3; 3 MG/ML; MG/ML
6 INJECTION, SUSPENSION INTRA-ARTICULAR; INTRALESIONAL; INTRAMUSCULAR; SOFT TISSUE ONCE
Status: COMPLETED | OUTPATIENT
Start: 2024-06-20 | End: 2024-06-20

## 2024-06-20 RX ORDER — LIDOCAINE HYDROCHLORIDE 10 MG/ML
3 INJECTION, SOLUTION INFILTRATION; PERINEURAL ONCE
Status: COMPLETED | OUTPATIENT
Start: 2024-06-20 | End: 2024-06-20

## 2024-06-20 RX ADMIN — BETAMETHASONE SODIUM PHOSPHATE AND BETAMETHASONE ACETATE 6 MG: 3; 3 INJECTION, SUSPENSION INTRA-ARTICULAR; INTRALESIONAL; INTRAMUSCULAR; SOFT TISSUE at 09:02

## 2024-06-20 RX ADMIN — LIDOCAINE HYDROCHLORIDE 3 ML: 10 INJECTION, SOLUTION INFILTRATION; PERINEURAL at 09:03

## 2024-06-20 NOTE — PROGRESS NOTES
of the lower extremities reveals pain-free range of motion of the hips.  There is no pain to palpation to the trochanter bursa.  Negative straight leg raise.  Negative calf tenderness but negative Homans.  No signs of DVT present.  The left knee reveals skin intact.  The surgical wound healed nicely.  There is no erythema or ecchymosis noted.  Has negative effusion.  She has full range of motion.  No defects in extensor mechanism.  Excellent stability.  The right knee reveals skin intact.  There is no erythema, ecchymosis or warmth.  There are no signs for infection or cellulitis present.  She is missing about 10 degrees on extension.  She has pain to palpation tricompartmentally and crepitus arising the anterior compartment.    Assessment: #1 right knee end-stage arthritis, #2 status post left total knee replacement    Plan: At this point, we discussed treatment options.  We will continue conservative treatment for now and move forward with a cortisone injection for the right knee today.  After informed consent, under aseptic conditions, with US guided assitance, the right knee was prepped with betadine and a mxiture of 3ml 1% lidocaine and 6mg of celestone was injected without complications.  The patient tolerated the injection well.  The patient is instructed on post-injection care.  She is looking forward to getting her right knee replaced into the new year.  Will see her back in 3 months time for reevaluation.    Care plan outlined and precautions discussed. Radiographs and Results were reviewed with the patient.  Medications were reviewed with the patient. All of pt's questions and concerns were addressed.  Increasing symptoms and return precautions associated with chief complaint and evaluation were reviewed with the patient in detail.  The patient demonstrated adequate understanding.  Social determinents of health were discussed and did not alter treatment plan.        Chart reviewed for the following:  I,

## 2024-08-19 RX ORDER — CELECOXIB 200 MG/1
CAPSULE ORAL
Qty: 60 CAPSULE | Refills: 2 | Status: SHIPPED | OUTPATIENT
Start: 2024-08-19

## 2024-09-20 ENCOUNTER — OFFICE VISIT (OUTPATIENT)
Age: 64
End: 2024-09-20

## 2024-09-20 VITALS — HEIGHT: 67 IN | WEIGHT: 190 LBS | BODY MASS INDEX: 29.82 KG/M2

## 2024-09-20 DIAGNOSIS — M17.11 PRIMARY OSTEOARTHRITIS OF RIGHT KNEE: Primary | ICD-10-CM

## 2024-09-20 RX ORDER — BETAMETHASONE SODIUM PHOSPHATE AND BETAMETHASONE ACETATE 3; 3 MG/ML; MG/ML
6 INJECTION, SUSPENSION INTRA-ARTICULAR; INTRALESIONAL; INTRAMUSCULAR; SOFT TISSUE ONCE
Status: COMPLETED | OUTPATIENT
Start: 2024-09-20 | End: 2024-09-20

## 2024-09-20 RX ORDER — LIDOCAINE HYDROCHLORIDE 10 MG/ML
3 INJECTION, SOLUTION INFILTRATION; PERINEURAL ONCE
Status: COMPLETED | OUTPATIENT
Start: 2024-09-20 | End: 2024-09-20

## 2024-09-20 RX ADMIN — LIDOCAINE HYDROCHLORIDE 3 ML: 10 INJECTION, SOLUTION INFILTRATION; PERINEURAL at 09:08

## 2024-09-20 RX ADMIN — BETAMETHASONE SODIUM PHOSPHATE AND BETAMETHASONE ACETATE 6 MG: 3; 3 INJECTION, SUSPENSION INTRA-ARTICULAR; INTRALESIONAL; INTRAMUSCULAR; SOFT TISSUE at 09:04

## 2024-12-12 ENCOUNTER — OFFICE VISIT (OUTPATIENT)
Age: 64
End: 2024-12-12
Payer: OTHER GOVERNMENT

## 2024-12-12 VITALS — HEIGHT: 67 IN | BODY MASS INDEX: 29.76 KG/M2

## 2024-12-12 DIAGNOSIS — M17.11 PRIMARY OSTEOARTHRITIS OF RIGHT KNEE: Primary | ICD-10-CM

## 2024-12-12 PROCEDURE — 99214 OFFICE O/P EST MOD 30 MIN: CPT | Performed by: PHYSICIAN ASSISTANT

## 2024-12-12 PROCEDURE — 20611 DRAIN/INJ JOINT/BURSA W/US: CPT | Performed by: PHYSICIAN ASSISTANT

## 2024-12-12 RX ORDER — BETAMETHASONE SODIUM PHOSPHATE AND BETAMETHASONE ACETATE 3; 3 MG/ML; MG/ML
6 INJECTION, SUSPENSION INTRA-ARTICULAR; INTRALESIONAL; INTRAMUSCULAR; SOFT TISSUE ONCE
Status: COMPLETED | OUTPATIENT
Start: 2024-12-12 | End: 2024-12-12

## 2024-12-12 RX ORDER — LIDOCAINE HYDROCHLORIDE 10 MG/ML
3 INJECTION, SOLUTION INFILTRATION; PERINEURAL ONCE
Status: COMPLETED | OUTPATIENT
Start: 2024-12-12 | End: 2024-12-12

## 2024-12-12 RX ADMIN — LIDOCAINE HYDROCHLORIDE 3 ML: 10 INJECTION, SOLUTION INFILTRATION; PERINEURAL at 09:08

## 2024-12-12 RX ADMIN — BETAMETHASONE SODIUM PHOSPHATE AND BETAMETHASONE ACETATE 6 MG: 3; 3 INJECTION, SUSPENSION INTRA-ARTICULAR; INTRALESIONAL; INTRAMUSCULAR; SOFT TISSUE at 09:07

## 2024-12-12 NOTE — PROGRESS NOTES
leg raise.  Negative calf tenderness.  Negative Homans.  No signs of DVT present.  The right knee reveals skin intact with there is no erythema, ecchymosis or warmth.  There are no signs of infection or cellulitis present.  Findings are consistent with advanced arthritis of the right knee with pain to palpation tricompartmentally and crepitus arising the anterior compartment.    Review of previous radiographs does confirm end-stage arthritis of the right knee.    Assessment: Right knee end-stage arthritis    Plan: At this point, we discussed treatment options.  We will move forth a cortisone injection for the right knee today.  After informed consent, under aseptic conditions, with US guided assitance, the right knee was prepped with betadine and a mxiture of 3ml 1% lidocaine and 6mg of celestone was injected without complications.  The patient tolerated the injection well.  The patient is instructed on post-injection care.  Total replacements were discussed and recommended for her.  She like to get this done in July.  The alternatives, risk, complications, expected outcome were discussed.  The patient understands wished to proceed.  She will continue with a home exercise program to maintain her mobility and range of motion.    Care plan outlined and precautions discussed. Radiographs and Results were reviewed with the patient.  Medications were reviewed with the patient. All of pt's questions and concerns were addressed.  Increasing symptoms and return precautions associated with chief complaint and evaluation were reviewed with the patient in detail.  The patient demonstrated adequate understanding.  Social determinents of health were discussed and did not alter treatment plan.        Chart reviewed for the following:  Alli CINTRON PA-C, have reviewed the History, Physical and updated the Allergic reactions for Diandra Gage?    TIME OUT performed immediately prior to start of procedure:  Alli CINTRON

## 2025-04-11 ENCOUNTER — OFFICE VISIT (OUTPATIENT)
Age: 65
End: 2025-04-11

## 2025-04-11 VITALS — HEIGHT: 67 IN | WEIGHT: 189 LBS | BODY MASS INDEX: 29.66 KG/M2

## 2025-04-11 DIAGNOSIS — M17.11 PRIMARY OSTEOARTHRITIS OF RIGHT KNEE: Primary | ICD-10-CM

## 2025-04-11 RX ORDER — BETAMETHASONE SODIUM PHOSPHATE AND BETAMETHASONE ACETATE 3; 3 MG/ML; MG/ML
6 INJECTION, SUSPENSION INTRA-ARTICULAR; INTRALESIONAL; INTRAMUSCULAR; SOFT TISSUE ONCE
Status: COMPLETED | OUTPATIENT
Start: 2025-04-11 | End: 2025-04-11

## 2025-04-11 RX ADMIN — BETAMETHASONE SODIUM PHOSPHATE AND BETAMETHASONE ACETATE 6 MG: 3; 3 INJECTION, SUSPENSION INTRA-ARTICULAR; INTRALESIONAL; INTRAMUSCULAR; SOFT TISSUE at 10:11

## 2025-04-11 NOTE — PROGRESS NOTES
prior to the start of the procedure:  ????????  * Patient was identified by name and date of birth   * Agreement on procedure being performed was verified  * Risks and Benefits explained to the patient  * Procedure site verified and marked as necessary  * Patient was positioned for comfort  * Consent was signed and verified    Time:10:10 AM    Body part: right knee, intra-articular    Medication & Dose: 3ml 1% lidocaine and 6mg celestone    Date of procedure: 04/11/25    Procedure performed by: MIRANDA BOYKIN PA-C    Provider assisted by: none    Patient assisted by: self    How tolerated by patient: tolerated the procedure well with no complications    Post Procedural Pain Scale: 8    Comments:   [facility] using a frequency of 10MHz with a 12L-RS transducer head was used to confirm needle placement.  Ultrasound images captured using [employer] Ultrasound machine and scanned into patient's chart       JR Jerel PADILLA PA-C, ATC      Note:  This note was generated with voice recognition software.  Any typographical/grammatical errors are unintentional.

## 2025-06-03 DIAGNOSIS — Z01.818 PRE-OP TESTING: Primary | ICD-10-CM

## 2025-06-03 DIAGNOSIS — M17.11 PRIMARY OSTEOARTHRITIS OF RIGHT KNEE: ICD-10-CM

## 2025-06-17 ENCOUNTER — PREP FOR PROCEDURE (OUTPATIENT)
Age: 65
End: 2025-06-17

## 2025-06-17 PROBLEM — M17.11 OSTEOARTHRITIS OF RIGHT KNEE: Status: ACTIVE | Noted: 2025-06-17

## 2025-07-07 ENCOUNTER — CLINICAL SUPPORT (OUTPATIENT)
Age: 65
End: 2025-07-07
Payer: OTHER GOVERNMENT

## 2025-07-07 ENCOUNTER — HOSPITAL ENCOUNTER (OUTPATIENT)
Facility: HOSPITAL | Age: 65
Discharge: HOME | End: 2025-07-10
Payer: OTHER GOVERNMENT

## 2025-07-07 DIAGNOSIS — M17.11 PRIMARY OSTEOARTHRITIS OF RIGHT KNEE: ICD-10-CM

## 2025-07-07 DIAGNOSIS — Z01.818 PRE-OP TESTING: ICD-10-CM

## 2025-07-07 DIAGNOSIS — M25.561 CHRONIC PAIN OF RIGHT KNEE: Primary | ICD-10-CM

## 2025-07-07 DIAGNOSIS — G89.29 CHRONIC PAIN OF RIGHT KNEE: Primary | ICD-10-CM

## 2025-07-07 LAB
ALBUMIN SERPL-MCNC: 4.1 G/DL (ref 3.4–5)
ANION GAP SERPL CALC-SCNC: 13 MMOL/L (ref 3–18)
APPEARANCE UR: CLEAR
APTT PPP: 24.3 SEC (ref 21.7–34.2)
BACTERIA URNS QL MICRO: ABNORMAL /HPF
BASOPHILS # BLD: 0.04 K/UL (ref 0–0.1)
BASOPHILS NFR BLD: 0.6 % (ref 0–2)
BILIRUB UR QL: NEGATIVE
BUN SERPL-MCNC: 22 MG/DL (ref 6–23)
BUN/CREAT SERPL: 38 (ref 12–20)
CALCIUM SERPL-MCNC: 9.5 MG/DL (ref 8.5–10.1)
CHLORIDE SERPL-SCNC: 103 MMOL/L (ref 98–107)
CO2 SERPL-SCNC: 26 MMOL/L (ref 21–32)
COLOR UR: YELLOW
CREAT SERPL-MCNC: 0.57 MG/DL (ref 0.6–1.3)
DIFFERENTIAL METHOD BLD: NORMAL
EKG ATRIAL RATE: 76 BPM
EKG DIAGNOSIS: NORMAL
EKG P AXIS: 81 DEGREES
EKG P-R INTERVAL: 172 MS
EKG Q-T INTERVAL: 402 MS
EKG QRS DURATION: 126 MS
EKG QTC CALCULATION (BAZETT): 452 MS
EKG R AXIS: 65 DEGREES
EKG T AXIS: 52 DEGREES
EKG VENTRICULAR RATE: 76 BPM
EOSINOPHIL # BLD: 0.29 K/UL (ref 0–0.4)
EOSINOPHIL NFR BLD: 4.4 % (ref 0–5)
EPITH CASTS URNS QL MICRO: ABNORMAL /LPF (ref 0–5)
ERYTHROCYTE [DISTWIDTH] IN BLOOD BY AUTOMATED COUNT: 12.6 % (ref 11.6–14.5)
EST. AVERAGE GLUCOSE BLD GHB EST-MCNC: 138 MG/DL
GLUCOSE SERPL-MCNC: 111 MG/DL (ref 74–108)
GLUCOSE UR STRIP.AUTO-MCNC: >1000 MG/DL
HBA1C MFR BLD: 6.4 % (ref 4.2–5.6)
HCT VFR BLD AUTO: 44.9 % (ref 35–45)
HGB BLD-MCNC: 14.8 G/DL (ref 12–16)
HGB UR QL STRIP: NEGATIVE
IMM GRANULOCYTES # BLD AUTO: 0.02 K/UL (ref 0–0.04)
IMM GRANULOCYTES NFR BLD AUTO: 0.3 % (ref 0–0.5)
INR PPP: 0.9 (ref 0.9–1.2)
KETONES UR QL STRIP.AUTO: ABNORMAL MG/DL
LEUKOCYTE ESTERASE UR QL STRIP.AUTO: NEGATIVE
LYMPHOCYTES # BLD: 2.34 K/UL (ref 0.9–3.6)
LYMPHOCYTES NFR BLD: 35.1 % (ref 21–52)
MCH RBC QN AUTO: 31.2 PG (ref 24–34)
MCHC RBC AUTO-ENTMCNC: 33 G/DL (ref 31–37)
MCV RBC AUTO: 94.5 FL (ref 78–100)
MONOCYTES # BLD: 0.44 K/UL (ref 0.05–1.2)
MONOCYTES NFR BLD: 6.6 % (ref 3–10)
MUCOUS THREADS URNS QL MICRO: ABNORMAL /LPF
NEUTS SEG # BLD: 3.53 K/UL (ref 1.8–8)
NEUTS SEG NFR BLD: 53 % (ref 40–73)
NITRITE UR QL STRIP.AUTO: NEGATIVE
NRBC # BLD: 0 K/UL (ref 0–0.01)
NRBC BLD-RTO: 0 PER 100 WBC
PH UR STRIP: 5 (ref 5–8)
PLATELET # BLD AUTO: 328 K/UL (ref 135–420)
PMV BLD AUTO: 10 FL (ref 9.2–11.8)
POTASSIUM SERPL-SCNC: 4.4 MMOL/L (ref 3.5–5.5)
PROT UR STRIP-MCNC: NEGATIVE MG/DL
PROTHROMBIN TIME: 12.4 SEC (ref 12–15.1)
RBC # BLD AUTO: 4.75 M/UL (ref 4.2–5.3)
RBC #/AREA URNS HPF: ABNORMAL /HPF (ref 0–5)
SODIUM SERPL-SCNC: 142 MMOL/L (ref 136–145)
SP GR UR REFRACTOMETRY: >1.03 (ref 1–1.04)
UROBILINOGEN UR QL STRIP.AUTO: 0.2 EU/DL (ref 0.2–1)
WBC # BLD AUTO: 6.7 K/UL (ref 4.6–13.2)
WBC URNS QL MICRO: ABNORMAL /HPF (ref 0–5)

## 2025-07-07 PROCEDURE — 87086 URINE CULTURE/COLONY COUNT: CPT

## 2025-07-07 PROCEDURE — 85610 PROTHROMBIN TIME: CPT

## 2025-07-07 PROCEDURE — 83036 HEMOGLOBIN GLYCOSYLATED A1C: CPT

## 2025-07-07 PROCEDURE — 93005 ELECTROCARDIOGRAM TRACING: CPT

## 2025-07-07 PROCEDURE — 71046 X-RAY EXAM CHEST 2 VIEWS: CPT

## 2025-07-07 PROCEDURE — 73564 X-RAY EXAM KNEE 4 OR MORE: CPT | Performed by: ORTHOPAEDIC SURGERY

## 2025-07-07 PROCEDURE — 36415 COLL VENOUS BLD VENIPUNCTURE: CPT

## 2025-07-07 PROCEDURE — 82040 ASSAY OF SERUM ALBUMIN: CPT

## 2025-07-07 PROCEDURE — 80048 BASIC METABOLIC PNL TOTAL CA: CPT

## 2025-07-07 PROCEDURE — 85730 THROMBOPLASTIN TIME PARTIAL: CPT

## 2025-07-07 PROCEDURE — 81001 URINALYSIS AUTO W/SCOPE: CPT

## 2025-07-07 PROCEDURE — 85025 COMPLETE CBC W/AUTO DIFF WBC: CPT

## 2025-07-08 LAB
BACTERIA SPEC CULT: NORMAL
SERVICE CMNT-IMP: NORMAL

## 2025-07-09 ENCOUNTER — OFFICE VISIT (OUTPATIENT)
Age: 65
End: 2025-07-09
Payer: OTHER GOVERNMENT

## 2025-07-09 VITALS
HEIGHT: 67 IN | BODY MASS INDEX: 29.26 KG/M2 | SYSTOLIC BLOOD PRESSURE: 124 MMHG | WEIGHT: 186.4 LBS | DIASTOLIC BLOOD PRESSURE: 77 MMHG

## 2025-07-09 DIAGNOSIS — Z01.818 PRE-OP EXAM: ICD-10-CM

## 2025-07-09 DIAGNOSIS — M17.11 PRIMARY OSTEOARTHRITIS OF RIGHT KNEE: Primary | ICD-10-CM

## 2025-07-09 PROCEDURE — 99214 OFFICE O/P EST MOD 30 MIN: CPT | Performed by: PHYSICIAN ASSISTANT

## 2025-07-09 RX ORDER — CELECOXIB 100 MG/1
200 CAPSULE ORAL
OUTPATIENT
Start: 2025-07-09 | End: 2025-07-09

## 2025-07-09 RX ORDER — ACETAMINOPHEN 325 MG/1
1000 TABLET ORAL
OUTPATIENT
Start: 2025-07-09 | End: 2025-07-09

## 2025-07-09 RX ORDER — TAMSULOSIN HYDROCHLORIDE 0.4 MG/1
0.4 CAPSULE ORAL
OUTPATIENT
Start: 2025-07-09 | End: 2025-07-09

## 2025-07-09 NOTE — H&P (VIEW-ONLY)
HISTORY & PHYSICAL      Patient: Diandra Gage                MRN: [id number]       SSN: [id number]  YOB: 1960        AGE: 64 y.o.        SEX: female  Body mass index is 29.19 kg/m².    PCP: Wilfredo Riojas MD  07/09/25      CC: right knee end stage OA  Problem List Items Addressed This Visit          Musculoskeletal and Integument    Osteoarthritis of right knee - Primary     Other Visit Diagnoses         Pre-op exam                  HPI:  The patient is a pleasant 64 y.o. whom has end stage OA of their right knee and has failed conservative treatment including but not limited to NSAIDS, cortisone injections, viscosupplementation, PT, and pain medicine.  Due to the current findings and affected activities of daily living, surgical intervention is indicated.  The alternatives, risks, complications, as well as expected outcome were discussed.  These include but are not limited to infection, blood loss, need for blood transfusion, neurovascular damage, DVT, PE,  post-op stiffness and pain, leg length discrepancy, dislocation, anesthetic complications, prothesis longevity, need for more surgery, MI, stroke, and even death.  The patient understands and wishes to proceed with surgery.      Past Medical History:   Diagnosis Date    Asthma     Diabetes (HCC)     Hyperlipidemia     Prolonged emergence from general anesthesia          Current Outpatient Medications:     celecoxib (CELEBREX) 200 MG capsule, TAKE 1 CAPSULE BY MOUTH TWICE DAILY, Disp: 60 capsule, Rfl: 2    Tirzepatide (MOUNJARO) 2.5 MG/0.5ML SOPN SC injection, Inject 0.5 mLs into the skin once a week, Disp: , Rfl:     gabapentin (NEURONTIN) 300 MG capsule, Take 1 capsule by mouth 4 times daily., Disp: , Rfl:     JARDIANCE 25 MG tablet, Take 1 tablet by mouth daily, Disp: , Rfl:     ibuprofen (ADVIL) 200 MG tablet, Take 1 tablet by mouth every 6 hours as needed for Pain, Disp: , Rfl:     metFORMIN (GLUCOPHAGE) 1000 MG tablet, Take 1

## 2025-07-09 NOTE — PATIENT INSTRUCTIONS
Patient: Diandra Gage                MRN: [id number]       SSN:   YOB: 1960        AGE: 64 y.o.        SEX: female  There is no height or weight on file to calculate BMI.    07/09/25    DO:  1:  Sit with the leg out straight 5-10 min every hour while awake.  Keep the toes pointed       up towards the freddy.         2.  Bend your knee 5-10 min every hour.  Bend it to the point of pain and hold it for 5-10       Min.        3.  ICE your knee 20 min every hour    4.  You can expect to have swelling and discomfort in your thigh down to your knee.  Swelling may extend to your foot.  You may have bruising from the thigh to the foot as well.  Some numbness can be expected to the outside part of the knee.  Wear the compression stockings and elevate your leg.      DO NOT:    1.  Do not place anything under your knee to prop it up  2.  Do not sit in the recliner chair.

## 2025-07-09 NOTE — H&P
HISTORY & PHYSICAL      Patient: Diandra Gage                MRN: [id number]       SSN: [id number]  YOB: 1960        AGE: 64 y.o.        SEX: female  Body mass index is 29.19 kg/m².    PCP: Wilfredo Riojas MD  07/09/25      CC: right knee end stage OA  Problem List Items Addressed This Visit          Musculoskeletal and Integument    Osteoarthritis of right knee - Primary     Other Visit Diagnoses         Pre-op exam                  HPI:  The patient is a pleasant 64 y.o. whom has end stage OA of their right knee and has failed conservative treatment including but not limited to NSAIDS, cortisone injections, viscosupplementation, PT, and pain medicine.  Due to the current findings and affected activities of daily living, surgical intervention is indicated.  The alternatives, risks, complications, as well as expected outcome were discussed.  These include but are not limited to infection, blood loss, need for blood transfusion, neurovascular damage, DVT, PE,  post-op stiffness and pain, leg length discrepancy, dislocation, anesthetic complications, prothesis longevity, need for more surgery, MI, stroke, and even death.  The patient understands and wishes to proceed with surgery.      Past Medical History:   Diagnosis Date    Asthma     Diabetes (HCC)     Hyperlipidemia     Prolonged emergence from general anesthesia          Current Outpatient Medications:     celecoxib (CELEBREX) 200 MG capsule, TAKE 1 CAPSULE BY MOUTH TWICE DAILY, Disp: 60 capsule, Rfl: 2    Tirzepatide (MOUNJARO) 2.5 MG/0.5ML SOPN SC injection, Inject 0.5 mLs into the skin once a week, Disp: , Rfl:     gabapentin (NEURONTIN) 300 MG capsule, Take 1 capsule by mouth 4 times daily., Disp: , Rfl:     JARDIANCE 25 MG tablet, Take 1 tablet by mouth daily, Disp: , Rfl:     ibuprofen (ADVIL) 200 MG tablet, Take 1 tablet by mouth every 6 hours as needed for Pain, Disp: , Rfl:     metFORMIN (GLUCOPHAGE) 1000 MG tablet, Take 1

## 2025-07-11 ENCOUNTER — CLINICAL DOCUMENTATION (OUTPATIENT)
Age: 65
End: 2025-07-11

## 2025-07-11 NOTE — PROGRESS NOTES
Patient brought in [place] paperwork and [place] family leave paperwork to be filled out  before her surgery. She can be reached at [phone].

## 2025-07-15 ENCOUNTER — CLINICAL DOCUMENTATION (OUTPATIENT)
Age: 65
End: 2025-07-15

## 2025-07-21 RX ORDER — MULTIVITAMIN WITH IRON
1 TABLET ORAL DAILY
COMMUNITY

## 2025-07-21 RX ORDER — FLUTICASONE PROPIONATE 50 MCG
1 SPRAY, SUSPENSION (ML) NASAL DAILY PRN
COMMUNITY

## 2025-07-21 ASSESSMENT — PROMIS GLOBAL HEALTH SCALE
SUM OF RESPONSES TO QUESTIONS 3, 6, 7, & 8: 17
IN THE PAST 7 DAYS, HOW OFTEN HAVE YOU BEEN BOTHERED BY EMOTIONAL PROBLEMS, SUCH AS FEELING ANXIOUS, DEPRESSED, OR IRRITABLE [ON A SCALE FROM 1 (NEVER) TO 5 (ALWAYS)]?: OFTEN
TO WHAT EXTENT ARE YOU ABLE TO CARRY OUT YOUR EVERYDAY PHYSICAL ACTIVITIES SUCH AS WALKING, CLIMBING STAIRS, CARRYING GROCERIES, OR MOVING A CHAIR [ON A SCALE OF 1 (NOT AT ALL) TO 5 (COMPLETELY)]?: A LITTLE
IN GENERAL, WOULD YOU SAY YOUR HEALTH IS...[ON A SCALE OF 1 (POOR) TO 5 (EXCELLENT)]: GOOD
IN GENERAL, WOULD YOU SAY YOUR QUALITY OF LIFE IS...[ON A SCALE OF 1 (POOR) TO 5 (EXCELLENT)]: FAIR
IN THE PAST 7 DAYS, HOW WOULD YOU RATE YOUR FATIGUE ON AVERAGE [ON A SCALE FROM 1 (NONE) TO 5 (VERY SEVERE)]?: MILD
IN GENERAL, HOW WOULD YOU RATE YOUR SATISFACTION WITH YOUR SOCIAL ACTIVITIES AND RELATIONSHIPS [ON A SCALE OF 1 (POOR) TO 5 (EXCELLENT)]?: GOOD
IN GENERAL, PLEASE RATE HOW WELL YOU CARRY OUT YOUR USUAL SOCIAL ACTIVITIES (INCLUDES ACTIVITIES AT HOME, AT WORK, AND IN YOUR COMMUNITY, AND RESPONSIBILITIES AS A PARENT, CHILD, SPOUSE, EMPLOYEE, FRIEND, ETC) [ON A SCALE OF 1 (POOR) TO 5 (EXCELLENT)]?: GOOD
SUM OF RESPONSES TO QUESTIONS 2, 4, 5, & 10: 10
HOW IS THE PROMIS V1.1 BEING ADMINISTERED?: PAPER
IN GENERAL, HOW WOULD YOU RATE YOUR MENTAL HEALTH, INCLUDING YOUR MOOD AND YOUR ABILITY TO THINK [ON A SCALE OF 1 (POOR) TO 5 (EXCELLENT)]?: GOOD
IN GENERAL, HOW WOULD YOU RATE YOUR PHYSICAL HEALTH [ON A SCALE OF 1 (POOR) TO 5 (EXCELLENT)]?: GOOD
IN THE PAST 7 DAYS, HOW WOULD YOU RATE YOUR PAIN ON AVERAGE [ON A SCALE FROM 0 (NO PAIN) TO 10 (WORST IMAGINABLE PAIN)]?: 8

## 2025-07-21 ASSESSMENT — KOOS JR
KOOS JR TOTAL INTERVAL SCORE: 42.281
TWISING OR PIVOTING ON KNEE: EXTREME
STRAIGHTENING KNEE FULLY: MODERATE
HOW SEVERE IS YOUR KNEE STIFFNESS AFTER FIRST WAKING IN MORNING: SEVERE
STANDING UPRIGHT: SEVERE
GOING UP OR DOWN STAIRS: SEVERE
RISING FROM SITTING: MODERATE
BENDING TO THE FLOOR TO PICK UP OBJECT: MILD

## 2025-07-21 NOTE — DISCHARGE INSTRUCTIONS
[facility] To Healing  Preparing for Surgery      General Instructions   Bring photo identification, insurance card and form of payment.   If you have a Living Will or Durable Power of , bring a copy.   Have a responsible adult drive you to the hospital and stay with you during surgery.   Leave personal belongings in your car until you have a room assigned.   If you use a CPAP or BiPAP machine, discuss bringing it with your care team.   Bring eyeglasses in a case. No contacts should be worn on surgery day.   Bring hearing aids.   Remove dentures before surgery.   Remove all piercings, rings and jewelry and leave at home.   Avoid wearing nail polish.   Wear your hair loose or down. No ponytails, buns or any metal hair accessories.   Wear clean, loose, comfortable clothing.   Do not use tobacco, nicotine, alcohol or recreational drugs before surgery. Tobacco/nicotine use increases your risk of complications during and after your operation and can delay wound healing. If you need assistance quitting, please contact your primary care provider.   Let your surgeon know if you develop any illness before surgery such as a cold, cough, sore throat, fever, nausea or vomiting. Also notify your surgeon’s office of any signs of infections including skin rashes, new wounds or dental infections.    Discuss visiting policies with your care team.   Prepare your home for recovery with clean sheets and clothes.      Medications  Bring a list of all your medications and their doses, including over the counter supplements.    If you are instructed to bring home medications, please give them to your nurse as the nursing staff will administer them. If bringing them in, they must be in the original pill bottle(s). Follow the instructions provided to you by your care team regarding which medications to take prior to surgery.   Anticoagulants (blood thinners)   Weight loss medications   Diabetic medications   Blood pressure

## 2025-07-28 ENCOUNTER — APPOINTMENT (OUTPATIENT)
Facility: HOSPITAL | Age: 65
End: 2025-07-28
Attending: ORTHOPAEDIC SURGERY
Payer: OTHER GOVERNMENT

## 2025-07-28 ENCOUNTER — ANESTHESIA (OUTPATIENT)
Facility: HOSPITAL | Age: 65
End: 2025-07-28
Payer: OTHER GOVERNMENT

## 2025-07-28 ENCOUNTER — HOSPITAL ENCOUNTER (OUTPATIENT)
Facility: HOSPITAL | Age: 65
Discharge: HOME | End: 2025-07-28
Attending: ORTHOPAEDIC SURGERY | Admitting: ORTHOPAEDIC SURGERY
Payer: OTHER GOVERNMENT

## 2025-07-28 ENCOUNTER — ANESTHESIA EVENT (OUTPATIENT)
Facility: HOSPITAL | Age: 65
End: 2025-07-28
Payer: OTHER GOVERNMENT

## 2025-07-28 VITALS
HEART RATE: 68 BPM | RESPIRATION RATE: 18 BRPM | SYSTOLIC BLOOD PRESSURE: 105 MMHG | DIASTOLIC BLOOD PRESSURE: 67 MMHG | OXYGEN SATURATION: 96 % | WEIGHT: 183.6 LBS | TEMPERATURE: 98 F | BODY MASS INDEX: 28.76 KG/M2

## 2025-07-28 DIAGNOSIS — M17.11 PRIMARY OSTEOARTHRITIS OF RIGHT KNEE: Primary | ICD-10-CM

## 2025-07-28 PROBLEM — M21.161 GENU VARUM OF RIGHT LOWER EXTREMITY: Status: ACTIVE | Noted: 2025-07-28

## 2025-07-28 PROBLEM — M21.261 FLEXION DEFORMITY OF RIGHT KNEE: Status: ACTIVE | Noted: 2025-07-28

## 2025-07-28 LAB
GLUCOSE BLD STRIP.AUTO-MCNC: 168 MG/DL (ref 70–110)
GLUCOSE BLD STRIP.AUTO-MCNC: 174 MG/DL (ref 70–110)

## 2025-07-28 PROCEDURE — 3600000014 HC SURGERY LEVEL 4 ADDTL 15MIN: Performed by: ORTHOPAEDIC SURGERY

## 2025-07-28 PROCEDURE — 27447 TOTAL KNEE ARTHROPLASTY: CPT | Performed by: PHYSICIAN ASSISTANT

## 2025-07-28 PROCEDURE — 2580000003 HC RX 258: Performed by: ORTHOPAEDIC SURGERY

## 2025-07-28 PROCEDURE — 97116 GAIT TRAINING THERAPY: CPT

## 2025-07-28 PROCEDURE — 2500000003 HC RX 250 WO HCPCS: Performed by: PHYSICIAN ASSISTANT

## 2025-07-28 PROCEDURE — 6370000000 HC RX 637 (ALT 250 FOR IP): Performed by: ORTHOPAEDIC SURGERY

## 2025-07-28 PROCEDURE — 6360000002 HC RX W HCPCS: Performed by: PHYSICIAN ASSISTANT

## 2025-07-28 PROCEDURE — 6370000000 HC RX 637 (ALT 250 FOR IP): Performed by: PHYSICIAN ASSISTANT

## 2025-07-28 PROCEDURE — 2500000003 HC RX 250 WO HCPCS: Performed by: ORTHOPAEDIC SURGERY

## 2025-07-28 PROCEDURE — 3700000000 HC ANESTHESIA ATTENDED CARE: Performed by: ORTHOPAEDIC SURGERY

## 2025-07-28 PROCEDURE — 73560 X-RAY EXAM OF KNEE 1 OR 2: CPT

## 2025-07-28 PROCEDURE — 2500000003 HC RX 250 WO HCPCS: Performed by: NURSE ANESTHETIST, CERTIFIED REGISTERED

## 2025-07-28 PROCEDURE — 2580000003 HC RX 258: Performed by: PHYSICIAN ASSISTANT

## 2025-07-28 PROCEDURE — C1713 ANCHOR/SCREW BN/BN,TIS/BN: HCPCS | Performed by: ORTHOPAEDIC SURGERY

## 2025-07-28 PROCEDURE — 3600000004 HC SURGERY LEVEL 4 BASE: Performed by: ORTHOPAEDIC SURGERY

## 2025-07-28 PROCEDURE — 2709999900 HC NON-CHARGEABLE SUPPLY: Performed by: ORTHOPAEDIC SURGERY

## 2025-07-28 PROCEDURE — 6360000002 HC RX W HCPCS: Performed by: NURSE ANESTHETIST, CERTIFIED REGISTERED

## 2025-07-28 PROCEDURE — 27447 TOTAL KNEE ARTHROPLASTY: CPT | Performed by: ORTHOPAEDIC SURGERY

## 2025-07-28 PROCEDURE — 82962 GLUCOSE BLOOD TEST: CPT

## 2025-07-28 PROCEDURE — 3700000001 HC ADD 15 MINUTES (ANESTHESIA): Performed by: ORTHOPAEDIC SURGERY

## 2025-07-28 PROCEDURE — 7100000001 HC PACU RECOVERY - ADDTL 15 MIN: Performed by: ORTHOPAEDIC SURGERY

## 2025-07-28 PROCEDURE — C1776 JOINT DEVICE (IMPLANTABLE): HCPCS | Performed by: ORTHOPAEDIC SURGERY

## 2025-07-28 PROCEDURE — 97535 SELF CARE MNGMENT TRAINING: CPT

## 2025-07-28 PROCEDURE — 6360000002 HC RX W HCPCS: Performed by: ORTHOPAEDIC SURGERY

## 2025-07-28 PROCEDURE — 64447 NJX AA&/STRD FEMORAL NRV IMG: CPT | Performed by: ANESTHESIOLOGY

## 2025-07-28 PROCEDURE — 6360000002 HC RX W HCPCS: Performed by: ANESTHESIOLOGY

## 2025-07-28 PROCEDURE — 97161 PT EVAL LOW COMPLEX 20 MIN: CPT

## 2025-07-28 PROCEDURE — 7100000000 HC PACU RECOVERY - FIRST 15 MIN: Performed by: ORTHOPAEDIC SURGERY

## 2025-07-28 PROCEDURE — 97165 OT EVAL LOW COMPLEX 30 MIN: CPT

## 2025-07-28 PROCEDURE — 2720000010 HC SURG SUPPLY STERILE: Performed by: ORTHOPAEDIC SURGERY

## 2025-07-28 DEVICE — IMPLANTABLE DEVICE: Type: IMPLANTABLE DEVICE | Site: KNEE | Status: FUNCTIONAL

## 2025-07-28 DEVICE — GENESIS TROCHLEAR PIN 1/8 IN. X 5IN
Type: IMPLANTABLE DEVICE | Site: KNEE | Status: FUNCTIONAL
Brand: GENESIS

## 2025-07-28 DEVICE — LEGION POSTERIOR STABILIZED HIGH FLEXION HIGHLY CROSS LINKED POLYETHYLENE SIZE 5-6 11MM
Type: IMPLANTABLE DEVICE | Site: KNEE | Status: FUNCTIONAL
Brand: LEGION

## 2025-07-28 DEVICE — GENESIS II OVAL RESURFACING                                    PATELLAR 35MM
Type: IMPLANTABLE DEVICE | Site: PATELLA | Status: FUNCTIONAL
Brand: GENESIS II

## 2025-07-28 DEVICE — GENESIS II NON-POROUS TIBIAL                                    BASEPLATE SIZE 5 RIGHT
Type: IMPLANTABLE DEVICE | Site: KNEE | Status: FUNCTIONAL
Brand: GENESIS II

## 2025-07-28 DEVICE — GENESIS PIN AND DRILL SET
Type: IMPLANTABLE DEVICE | Site: KNEE | Status: FUNCTIONAL
Brand: GENESIS

## 2025-07-28 DEVICE — LEGION POSTERIOR STABILIZED OXINIUM FEMORAL SIZE 7 RIGHT
Type: IMPLANTABLE DEVICE | Site: KNEE | Status: FUNCTIONAL
Brand: LEGION

## 2025-07-28 RX ORDER — ASPIRIN 81 MG/1
81 TABLET ORAL 2 TIMES DAILY
Qty: 60 TABLET | Refills: 3 | Status: SHIPPED | OUTPATIENT
Start: 2025-07-28

## 2025-07-28 RX ORDER — KETOROLAC TROMETHAMINE 15 MG/ML
15 INJECTION, SOLUTION INTRAMUSCULAR; INTRAVENOUS EVERY 6 HOURS
Status: DISCONTINUED | OUTPATIENT
Start: 2025-07-28 | End: 2025-07-28 | Stop reason: HOSPADM

## 2025-07-28 RX ORDER — SODIUM CHLORIDE 0.9 % (FLUSH) 0.9 %
5-40 SYRINGE (ML) INJECTION PRN
Status: DISCONTINUED | OUTPATIENT
Start: 2025-07-28 | End: 2025-07-28 | Stop reason: HOSPADM

## 2025-07-28 RX ORDER — CELECOXIB 100 MG/1
200 CAPSULE ORAL DAILY
Status: DISCONTINUED | OUTPATIENT
Start: 2025-07-28 | End: 2025-07-28 | Stop reason: HOSPADM

## 2025-07-28 RX ORDER — SENNA AND DOCUSATE SODIUM 50; 8.6 MG/1; MG/1
1 TABLET, FILM COATED ORAL 2 TIMES DAILY
Status: DISCONTINUED | OUTPATIENT
Start: 2025-07-28 | End: 2025-07-28 | Stop reason: HOSPADM

## 2025-07-28 RX ORDER — FAMOTIDINE 20 MG/1
20 TABLET, FILM COATED ORAL ONCE
Status: COMPLETED | OUTPATIENT
Start: 2025-07-28 | End: 2025-07-28

## 2025-07-28 RX ORDER — NALOXONE HYDROCHLORIDE 0.4 MG/ML
0.1 INJECTION, SOLUTION INTRAMUSCULAR; INTRAVENOUS; SUBCUTANEOUS PRN
Status: DISCONTINUED | OUTPATIENT
Start: 2025-07-28 | End: 2025-07-28

## 2025-07-28 RX ORDER — OXYCODONE HYDROCHLORIDE 5 MG/1
5 TABLET ORAL
Status: DISCONTINUED | OUTPATIENT
Start: 2025-07-28 | End: 2025-07-28 | Stop reason: HOSPADM

## 2025-07-28 RX ORDER — EPHEDRINE SULFATE/0.9% NACL/PF 25 MG/5 ML
SYRINGE (ML) INTRAVENOUS
Status: DISCONTINUED | OUTPATIENT
Start: 2025-07-28 | End: 2025-07-28 | Stop reason: SDUPTHER

## 2025-07-28 RX ORDER — ONDANSETRON 2 MG/ML
INJECTION INTRAMUSCULAR; INTRAVENOUS
Status: DISCONTINUED | OUTPATIENT
Start: 2025-07-28 | End: 2025-07-28 | Stop reason: SDUPTHER

## 2025-07-28 RX ORDER — ROCURONIUM BROMIDE 10 MG/ML
INJECTION, SOLUTION INTRAVENOUS
Status: DISCONTINUED | OUTPATIENT
Start: 2025-07-28 | End: 2025-07-28 | Stop reason: SDUPTHER

## 2025-07-28 RX ORDER — PROPOFOL 10 MG/ML
INJECTION, EMULSION INTRAVENOUS
Status: DISCONTINUED | OUTPATIENT
Start: 2025-07-28 | End: 2025-07-28 | Stop reason: SDUPTHER

## 2025-07-28 RX ORDER — CELECOXIB 200 MG/1
200 CAPSULE ORAL DAILY
Qty: 30 CAPSULE | Refills: 2 | Status: SHIPPED | OUTPATIENT
Start: 2025-07-28

## 2025-07-28 RX ORDER — SODIUM CHLORIDE 0.9 % (FLUSH) 0.9 %
5-40 SYRINGE (ML) INJECTION EVERY 12 HOURS SCHEDULED
Status: DISCONTINUED | OUTPATIENT
Start: 2025-07-28 | End: 2025-07-28 | Stop reason: HOSPADM

## 2025-07-28 RX ORDER — LIDOCAINE HYDROCHLORIDE 20 MG/ML
INJECTION, SOLUTION INFILTRATION; PERINEURAL
Status: COMPLETED | OUTPATIENT
Start: 2025-07-28 | End: 2025-07-28

## 2025-07-28 RX ORDER — LIDOCAINE HYDROCHLORIDE 20 MG/ML
INJECTION, SOLUTION EPIDURAL; INFILTRATION; INTRACAUDAL; PERINEURAL
Status: DISCONTINUED | OUTPATIENT
Start: 2025-07-28 | End: 2025-07-28 | Stop reason: SDUPTHER

## 2025-07-28 RX ORDER — SODIUM CHLORIDE 9 MG/ML
INJECTION, SOLUTION INTRAVENOUS PRN
Status: DISCONTINUED | OUTPATIENT
Start: 2025-07-28 | End: 2025-07-28 | Stop reason: HOSPADM

## 2025-07-28 RX ORDER — DOCUSATE SODIUM 100 MG/1
100 CAPSULE, LIQUID FILLED ORAL 2 TIMES DAILY
Qty: 60 CAPSULE | Refills: 1 | Status: SHIPPED | OUTPATIENT
Start: 2025-07-28 | End: 2025-09-26

## 2025-07-28 RX ORDER — ROPIVACAINE HYDROCHLORIDE 5 MG/ML
INJECTION, SOLUTION EPIDURAL; INFILTRATION; PERINEURAL
Status: COMPLETED | OUTPATIENT
Start: 2025-07-28 | End: 2025-07-28

## 2025-07-28 RX ORDER — OXYCODONE HYDROCHLORIDE 10 MG/1
10 TABLET ORAL
Status: DISCONTINUED | OUTPATIENT
Start: 2025-07-28 | End: 2025-07-28 | Stop reason: HOSPADM

## 2025-07-28 RX ORDER — ONDANSETRON 4 MG/1
4 TABLET, ORALLY DISINTEGRATING ORAL EVERY 6 HOURS PRN
Status: DISCONTINUED | OUTPATIENT
Start: 2025-07-28 | End: 2025-07-28 | Stop reason: HOSPADM

## 2025-07-28 RX ORDER — DEXTROSE MONOHYDRATE 100 MG/ML
INJECTION, SOLUTION INTRAVENOUS CONTINUOUS PRN
Status: DISCONTINUED | OUTPATIENT
Start: 2025-07-28 | End: 2025-07-28 | Stop reason: HOSPADM

## 2025-07-28 RX ORDER — TAMSULOSIN HYDROCHLORIDE 0.4 MG/1
0.4 CAPSULE ORAL
Status: COMPLETED | OUTPATIENT
Start: 2025-07-28 | End: 2025-07-28

## 2025-07-28 RX ORDER — MIDAZOLAM HYDROCHLORIDE 2 MG/2ML
2 INJECTION, SOLUTION INTRAMUSCULAR; INTRAVENOUS ONCE
Status: COMPLETED | OUTPATIENT
Start: 2025-07-28 | End: 2025-07-28

## 2025-07-28 RX ORDER — POLYETHYLENE GLYCOL 3350 17 G/17G
17 POWDER, FOR SOLUTION ORAL DAILY PRN
Status: DISCONTINUED | OUTPATIENT
Start: 2025-07-28 | End: 2025-07-28 | Stop reason: HOSPADM

## 2025-07-28 RX ORDER — DEXAMETHASONE SODIUM PHOSPHATE 4 MG/ML
INJECTION, SOLUTION INTRA-ARTICULAR; INTRALESIONAL; INTRAMUSCULAR; INTRAVENOUS; SOFT TISSUE
Status: DISCONTINUED | OUTPATIENT
Start: 2025-07-28 | End: 2025-07-28 | Stop reason: SDUPTHER

## 2025-07-28 RX ORDER — DIPHENHYDRAMINE HYDROCHLORIDE 50 MG/ML
12.5 INJECTION, SOLUTION INTRAMUSCULAR; INTRAVENOUS
Status: DISCONTINUED | OUTPATIENT
Start: 2025-07-28 | End: 2025-07-28 | Stop reason: HOSPADM

## 2025-07-28 RX ORDER — CEFADROXIL 500 MG/1
500 CAPSULE ORAL 2 TIMES DAILY
Qty: 10 CAPSULE | Refills: 0 | Status: SHIPPED | OUTPATIENT
Start: 2025-07-28 | End: 2025-08-02

## 2025-07-28 RX ORDER — DEXMEDETOMIDINE HYDROCHLORIDE 100 UG/ML
INJECTION, SOLUTION INTRAVENOUS
Status: DISCONTINUED | OUTPATIENT
Start: 2025-07-28 | End: 2025-07-28 | Stop reason: SDUPTHER

## 2025-07-28 RX ORDER — GLYCOPYRROLATE 0.2 MG/ML
INJECTION INTRAMUSCULAR; INTRAVENOUS
Status: DISCONTINUED | OUTPATIENT
Start: 2025-07-28 | End: 2025-07-28 | Stop reason: SDUPTHER

## 2025-07-28 RX ORDER — ACETAMINOPHEN 500 MG
1000 TABLET ORAL EVERY 6 HOURS
Status: DISCONTINUED | OUTPATIENT
Start: 2025-07-28 | End: 2025-07-28 | Stop reason: HOSPADM

## 2025-07-28 RX ORDER — ONDANSETRON 2 MG/ML
4 INJECTION INTRAMUSCULAR; INTRAVENOUS EVERY 6 HOURS PRN
Status: DISCONTINUED | OUTPATIENT
Start: 2025-07-28 | End: 2025-07-28 | Stop reason: HOSPADM

## 2025-07-28 RX ORDER — OXYCODONE HYDROCHLORIDE 5 MG/1
5 TABLET ORAL EVERY 4 HOURS PRN
Qty: 42 TABLET | Refills: 0 | Status: SHIPPED | OUTPATIENT
Start: 2025-07-28 | End: 2025-08-04

## 2025-07-28 RX ORDER — NALOXONE HYDROCHLORIDE 0.4 MG/ML
0.4 INJECTION, SOLUTION INTRAMUSCULAR; INTRAVENOUS; SUBCUTANEOUS PRN
Status: DISCONTINUED | OUTPATIENT
Start: 2025-07-28 | End: 2025-07-28 | Stop reason: HOSPADM

## 2025-07-28 RX ORDER — FENTANYL CITRATE 50 UG/ML
50 INJECTION, SOLUTION INTRAMUSCULAR; INTRAVENOUS EVERY 5 MIN PRN
Status: DISCONTINUED | OUTPATIENT
Start: 2025-07-28 | End: 2025-07-28 | Stop reason: HOSPADM

## 2025-07-28 RX ORDER — ONDANSETRON 4 MG/1
4 TABLET, FILM COATED ORAL EVERY 8 HOURS PRN
Qty: 30 TABLET | Refills: 2 | Status: SHIPPED | OUTPATIENT
Start: 2025-07-28

## 2025-07-28 RX ORDER — ASPIRIN 81 MG/1
81 TABLET ORAL 2 TIMES DAILY
Status: DISCONTINUED | OUTPATIENT
Start: 2025-07-29 | End: 2025-07-28 | Stop reason: HOSPADM

## 2025-07-28 RX ORDER — SODIUM CHLORIDE, SODIUM LACTATE, POTASSIUM CHLORIDE, CALCIUM CHLORIDE 600; 310; 30; 20 MG/100ML; MG/100ML; MG/100ML; MG/100ML
INJECTION, SOLUTION INTRAVENOUS CONTINUOUS
Status: DISCONTINUED | OUTPATIENT
Start: 2025-07-28 | End: 2025-07-28 | Stop reason: HOSPADM

## 2025-07-28 RX ORDER — ONDANSETRON 2 MG/ML
4 INJECTION INTRAMUSCULAR; INTRAVENOUS
Status: DISCONTINUED | OUTPATIENT
Start: 2025-07-28 | End: 2025-07-28 | Stop reason: HOSPADM

## 2025-07-28 RX ORDER — ACETAMINOPHEN 325 MG/1
1000 TABLET ORAL
Status: COMPLETED | OUTPATIENT
Start: 2025-07-28 | End: 2025-07-28

## 2025-07-28 RX ORDER — FENTANYL CITRATE 50 UG/ML
INJECTION, SOLUTION INTRAMUSCULAR; INTRAVENOUS
Status: DISCONTINUED | OUTPATIENT
Start: 2025-07-28 | End: 2025-07-28 | Stop reason: SDUPTHER

## 2025-07-28 RX ORDER — SODIUM CHLORIDE 9 MG/ML
INJECTION, SOLUTION INTRAVENOUS CONTINUOUS
Status: DISCONTINUED | OUTPATIENT
Start: 2025-07-28 | End: 2025-07-28 | Stop reason: HOSPADM

## 2025-07-28 RX ORDER — FAMOTIDINE 20 MG/1
20 TABLET, FILM COATED ORAL 2 TIMES DAILY
Status: DISCONTINUED | OUTPATIENT
Start: 2025-07-28 | End: 2025-07-28 | Stop reason: HOSPADM

## 2025-07-28 RX ORDER — NEOSTIGMINE METHYLSULFATE 1 MG/ML
INJECTION, SOLUTION INTRAVENOUS
Status: DISCONTINUED | OUTPATIENT
Start: 2025-07-28 | End: 2025-07-28 | Stop reason: SDUPTHER

## 2025-07-28 RX ORDER — FENTANYL CITRATE 50 UG/ML
100 INJECTION, SOLUTION INTRAMUSCULAR; INTRAVENOUS ONCE
Refills: 0 | Status: COMPLETED | OUTPATIENT
Start: 2025-07-28 | End: 2025-07-28

## 2025-07-28 RX ORDER — GLUCAGON 1 MG
1 KIT INJECTION PRN
Status: DISCONTINUED | OUTPATIENT
Start: 2025-07-28 | End: 2025-07-28 | Stop reason: HOSPADM

## 2025-07-28 RX ORDER — INSULIN LISPRO 100 [IU]/ML
0-8 INJECTION, SOLUTION INTRAVENOUS; SUBCUTANEOUS
Status: DISCONTINUED | OUTPATIENT
Start: 2025-07-28 | End: 2025-07-28 | Stop reason: HOSPADM

## 2025-07-28 RX ORDER — LIDOCAINE HYDROCHLORIDE 20 MG/ML
5 INJECTION, SOLUTION EPIDURAL; INFILTRATION; INTRACAUDAL; PERINEURAL ONCE
Status: COMPLETED | OUTPATIENT
Start: 2025-07-28 | End: 2025-07-28

## 2025-07-28 RX ORDER — PROMETHAZINE HYDROCHLORIDE 12.5 MG/1
12.5 TABLET ORAL ONCE
Status: COMPLETED | OUTPATIENT
Start: 2025-07-28 | End: 2025-07-28

## 2025-07-28 RX ORDER — ROPIVACAINE HYDROCHLORIDE 5 MG/ML
30 INJECTION, SOLUTION EPIDURAL; INFILTRATION; PERINEURAL ONCE
Status: COMPLETED | OUTPATIENT
Start: 2025-07-28 | End: 2025-07-28

## 2025-07-28 RX ORDER — CELECOXIB 100 MG/1
200 CAPSULE ORAL
Status: COMPLETED | OUTPATIENT
Start: 2025-07-28 | End: 2025-07-28

## 2025-07-28 RX ORDER — ACETAMINOPHEN 500 MG
1000 TABLET ORAL EVERY 8 HOURS PRN
Qty: 90 TABLET | Refills: 1 | Status: SHIPPED | OUTPATIENT
Start: 2025-07-28

## 2025-07-28 RX ORDER — SUCCINYLCHOLINE/SOD CL,ISO/PF 100 MG/5ML
SYRINGE (ML) INTRAVENOUS
Status: DISCONTINUED | OUTPATIENT
Start: 2025-07-28 | End: 2025-07-28 | Stop reason: SDUPTHER

## 2025-07-28 RX ADMIN — MIDAZOLAM HYDROCHLORIDE 2 MG: 1 INJECTION, SOLUTION INTRAMUSCULAR; INTRAVENOUS at 07:09

## 2025-07-28 RX ADMIN — DEXAMETHASONE SODIUM PHOSPHATE 4 MG: 4 INJECTION INTRA-ARTICULAR; INTRALESIONAL; INTRAMUSCULAR; INTRAVENOUS; SOFT TISSUE at 07:44

## 2025-07-28 RX ADMIN — FAMOTIDINE 20 MG: 20 TABLET, FILM COATED ORAL at 07:01

## 2025-07-28 RX ADMIN — TRANEXAMIC ACID 1000 MG: 100 INJECTION, SOLUTION INTRAVENOUS at 07:40

## 2025-07-28 RX ADMIN — LIDOCAINE HYDROCHLORIDE 60 MG: 20 INJECTION, SOLUTION EPIDURAL; INFILTRATION; INTRACAUDAL; PERINEURAL at 07:29

## 2025-07-28 RX ADMIN — Medication 2.5 MG: at 08:24

## 2025-07-28 RX ADMIN — WATER 2000 MG: 1 INJECTION INTRAMUSCULAR; INTRAVENOUS; SUBCUTANEOUS at 07:35

## 2025-07-28 RX ADMIN — Medication 100 MG: at 07:29

## 2025-07-28 RX ADMIN — TAMSULOSIN HYDROCHLORIDE 0.4 MG: 0.4 CAPSULE ORAL at 06:53

## 2025-07-28 RX ADMIN — LIDOCAINE HYDROCHLORIDE 3 ML: 20 INJECTION, SOLUTION INFILTRATION; PERINEURAL at 07:09

## 2025-07-28 RX ADMIN — FENTANYL CITRATE 50 MCG: 50 INJECTION, SOLUTION INTRAMUSCULAR; INTRAVENOUS at 07:09

## 2025-07-28 RX ADMIN — Medication 3 MG: at 09:06

## 2025-07-28 RX ADMIN — OXYCODONE HYDROCHLORIDE 5 MG: 5 TABLET ORAL at 11:08

## 2025-07-28 RX ADMIN — ROCURONIUM BROMIDE 10 MG: 10 INJECTION, SOLUTION INTRAVENOUS at 07:29

## 2025-07-28 RX ADMIN — PROMETHAZINE HYDROCHLORIDE 12.5 MG: 12.5 TABLET ORAL at 07:01

## 2025-07-28 RX ADMIN — ROPIVACAINE HYDROCHLORIDE 30 ML: 5 INJECTION EPIDURAL; INFILTRATION; PERINEURAL at 07:11

## 2025-07-28 RX ADMIN — ONDANSETRON 4 MG: 2 INJECTION, SOLUTION INTRAMUSCULAR; INTRAVENOUS at 08:58

## 2025-07-28 RX ADMIN — Medication 5 MG: at 09:01

## 2025-07-28 RX ADMIN — ROPIVACAINE HYDROCHLORIDE 30 ML: 5 INJECTION EPIDURAL; INFILTRATION; PERINEURAL at 07:12

## 2025-07-28 RX ADMIN — ROCURONIUM BROMIDE 30 MG: 10 INJECTION, SOLUTION INTRAVENOUS at 07:43

## 2025-07-28 RX ADMIN — FENTANYL CITRATE 50 MCG: 50 INJECTION INTRAMUSCULAR; INTRAVENOUS at 07:57

## 2025-07-28 RX ADMIN — LIDOCAINE HYDROCHLORIDE 1 ML: 20 INJECTION, SOLUTION EPIDURAL; INFILTRATION; INTRACAUDAL; PERINEURAL at 07:10

## 2025-07-28 RX ADMIN — DEXMEDETOMIDINE 4 MCG: 200 INJECTION, SOLUTION INTRAVENOUS at 07:59

## 2025-07-28 RX ADMIN — FENTANYL CITRATE 25 MCG: 50 INJECTION INTRAMUSCULAR; INTRAVENOUS at 09:14

## 2025-07-28 RX ADMIN — KETOROLAC TROMETHAMINE 15 MG: 15 INJECTION, SOLUTION INTRAMUSCULAR; INTRAVENOUS at 10:13

## 2025-07-28 RX ADMIN — ACETAMINOPHEN 975 MG: 325 TABLET ORAL at 06:53

## 2025-07-28 RX ADMIN — PROPOFOL 150 MG: 10 INJECTION, EMULSION INTRAVENOUS at 07:29

## 2025-07-28 RX ADMIN — FENTANYL CITRATE 25 MCG: 50 INJECTION INTRAMUSCULAR; INTRAVENOUS at 08:33

## 2025-07-28 RX ADMIN — SODIUM CHLORIDE, SODIUM LACTATE, POTASSIUM CHLORIDE, AND CALCIUM CHLORIDE: .6; .31; .03; .02 INJECTION, SOLUTION INTRAVENOUS at 06:53

## 2025-07-28 RX ADMIN — DEXMEDETOMIDINE 4 MCG: 200 INJECTION, SOLUTION INTRAVENOUS at 08:11

## 2025-07-28 RX ADMIN — TRANEXAMIC ACID 1000 MG: 100 INJECTION, SOLUTION INTRAVENOUS at 08:58

## 2025-07-28 RX ADMIN — GLYCOPYRROLATE 0.4 MG: 0.2 INJECTION INTRAMUSCULAR; INTRAVENOUS at 09:06

## 2025-07-28 RX ADMIN — SENNOSIDES AND DOCUSATE SODIUM 1 TABLET: 50; 8.6 TABLET ORAL at 11:04

## 2025-07-28 RX ADMIN — CELECOXIB 200 MG: 100 CAPSULE ORAL at 06:53

## 2025-07-28 ASSESSMENT — PAIN SCALES - GENERAL
PAINLEVEL_OUTOF10: 5
PAINLEVEL_OUTOF10: 5
PAINLEVEL_OUTOF10: 0
PAINLEVEL_OUTOF10: 10
PAINLEVEL_OUTOF10: 0
PAINLEVEL_OUTOF10: 5

## 2025-07-28 ASSESSMENT — PAIN DESCRIPTION - DESCRIPTORS
DESCRIPTORS: ACHING
DESCRIPTORS: ACHING;DISCOMFORT
DESCRIPTORS: ACHING

## 2025-07-28 ASSESSMENT — PAIN DESCRIPTION - LOCATION
LOCATION: KNEE

## 2025-07-28 ASSESSMENT — PAIN DESCRIPTION - ONSET: ONSET: ON-GOING

## 2025-07-28 ASSESSMENT — PAIN DESCRIPTION - PAIN TYPE
TYPE: ACUTE PAIN;SURGICAL PAIN
TYPE: SURGICAL PAIN
TYPE: ACUTE PAIN;SURGICAL PAIN

## 2025-07-28 ASSESSMENT — PAIN - FUNCTIONAL ASSESSMENT
PAIN_FUNCTIONAL_ASSESSMENT: ACTIVITIES ARE NOT PREVENTED
PAIN_FUNCTIONAL_ASSESSMENT: 0-10

## 2025-07-28 ASSESSMENT — PAIN DESCRIPTION - ORIENTATION
ORIENTATION: RIGHT
ORIENTATION: RIGHT;ANTERIOR
ORIENTATION: RIGHT;ANTERIOR

## 2025-07-28 ASSESSMENT — PAIN DESCRIPTION - FREQUENCY
FREQUENCY: INTERMITTENT
FREQUENCY: INTERMITTENT

## 2025-07-28 NOTE — DISCHARGE SUMMARY
7/28/2025  5:30 AM    7/28/2025, 9:23 AM    Primary Dx:right Orthopedic / Rheumatologic: Total Knee Replacement  Secondary Dx: Etiological Diagnoses: none    HPI:  Pt has end stage OA of their right knee and had failed conservative treatment.  Due to the current findings and affected activity of daily living surgical intervention is indicated.  The alternatives, risks, complications as well as expected outcome were discussed, the patient understands and wishes to proceed with surgery    Past Medical History:   Diagnosis Date    Asthma     seasonal-no inhaler    Diabetes (HCC)     Hyperlipidemia     Lumbar herniated disc     chronic back pain-connat lay on back    Prolonged emergence from general anesthesia          Current Facility-Administered Medications:     sodium chloride (PF) 0.9 % 25 mL with BUPivacaine (MARCAINE) 0.5 % 62.5 mg, BUPivacaine liposome (EXPAREL) 133 mg, EPINEPHrine 0.25 mg, ketorolac (TORADOL) 15 MG/ML 15 mg, , Intra-artICUlar, Once, Alli Beltrán PA-C    lactated ringers infusion, , IntraVENous, Continuous, Shamir Estrada MD, Last Rate: 100 mL/hr at 07/28/25 0724, NoRateChange at 07/28/25 0724    sod chloride IRR soln 0.9 % 250 mL irrigation, , , PRN, Shamir Estrada MD, Given at 07/28/25 0752    sod chloride IRR soln 0.9 % 500 mL irrigation, , , PRN, Shamir Estrada MD, Given at 07/28/25 0752    sod chloride IRR soln 0.9 % 250 mL with povidone-iodine 5 % 30 mL irrigation, , , PRN, Shamir Estrada MD, Given at 07/28/25 0856    sodium chloride 0.9 % 1,000 mL with vancomycin (VANCOCIN) 1,000 mg, polymyxin B 500,000 Units, , , PRN, Shamir Estrada MD, Given at 07/28/25 0752    sodium chloride 0.9 % 50 mL with BUPivacaine (PF) (MARCAINE) 0.5 % 25 mL, BUPivacaine liposome (EXPAREL) 20 mL, EPINEPHrine PF 1 MG/ML 0.5 mg, ketorolac (TORADOL) 30 MG/ML 30 mg, , , PRN, Shamir Estrada MD, 97.5 mL at 07/28/25 0840    Facility-Administered Medications Ordered in Other Encounters:     rocuronium

## 2025-07-28 NOTE — PROGRESS NOTES
Occupational Therapy    OCCUPATIONAL THERAPY EVALUATION/DISCHARGE    Patient: Diandra Gage (64 y.o. female)  Date: 7/28/2025  Primary Diagnosis: Osteoarthritis of right knee [M17.11]  Arthritis of knee [M17.10]  Procedure(s) (LRB):  Right total knee replacement. (Right) * Day of Surgery *   Precautions: General Precautions, Weight Bearing, Right Lower Extremity Weight Bearing: Weight Bearing As Tolerated,   ,    PLOF: Pt lives w/ spouse in a 2 SH able to live on 1st floor [facility], has a walk in shower w/ shower chair. Independent w/ ADLs and functional mobility.      ASSESSMENT AND RECOMMENDATIONS:  Pt seated in recliner, agreeable to OT session. Pt reports needing to use the bathroom. Sit>stand SPV. Pt ambulates to toilet in bathroom using RW, SPV. Toilet t/f and pericare mod I. Pt completes LE and UE dressing independently. Pt ambulates back to recliner, use of RW, SPV. No LOB noted. Pt requests long handled sponge to assist in LE bathing; OT provides AE to pt. Pt w/ no further acute care OT needs at this time. OT to sign off at this time.     Maximum therapeutic gains met at current level of care and patient will be discharged from occupational therapy at this time.    Further Equipment Recommendations for Discharge: Patient has all needed DME for home safety.    [facility]: AM-PAC Inpatient Daily Activity Raw Score: 24    At this time and based on an AM-PAC score, no further OT is recommended upon discharge.  Recommend patient returns to prior setting with prior services.    This [facility] score should be considered in conjunction with interdisciplinary team recommendations to determine the most appropriate discharge setting. Patient's social support, diagnosis, medical stability, and prior level of function should also be taken into consideration.     SUBJECTIVE:   Patient stated “I can do it on my own.”    OBJECTIVE DATA SUMMARY:     Past Medical History:   Diagnosis Date    Asthma     seasonal-no inhaler    Diabetes 
Physical Therapy  PHYSICAL THERAPY EVALUATION/DISCHARGE    Patient: Diandra Gage (64 y.o. female)  Date: 7/28/2025  Primary Diagnosis: Osteoarthritis of right knee [M17.11]  Arthritis of knee [M17.10]  Procedure(s) (LRB):  Right total knee replacement. (Right) * Day of Surgery *   Precautions: General Precautions, Weight Bearing, Right Lower Extremity Weight Bearing: Weight Bearing As Tolerated,  ,  ,  ,  ,  ,    PLOF: Pt lives with her  in a two story home, able to live on main level, with 1 [place].  Pt was independent with all mobility, has a RW.      ASSESSMENT AND RECOMMENDATIONS:  Patient is 63 yo female admitted to hospital for TKA and presents today POD 0. Patient was up in recliner upon arrival and agreeable to therapy. Patient was educated on weight bearing status and role of therapy. Patient demonstrated good SLR and objective assessment performed prior to mobilizing. Patient was given demo with instruction on sit <> stand transfer and gait training. Patient transferred to standing with Rw and ambulated 60ft with supervision.  Patient went up/down 1 step with verbal cues for correct sequencing. At conclusion of session patient transferred to sitting in recliner and was left resting with call bell by their side. Patient was educated on HEP, ice use, ambulation frequency. All questions answered. Pt is safe for transition home from PT standpoint. Will sign off, RN updated.      Patient does not require further skilled physical therapy intervention at this level of care.    Further Equipment Recommendations for Discharge: Patient has all needed DME for home safety.    AMPA: AM-PAC Inpatient Mobility Raw Score : 19      Current research shows that an AM-PAC score of 18 (14 without stairs) or greater is associated with a discharge to the patient's home setting.    This AMPAC score should be considered in conjunction with interdisciplinary team recommendations to determine the most appropriate discharge 
surgery.   17. TWO VISITORS will be allowed in the waiting area during your surgery.  Exceptions may be made for surgical admissions, per nursing unit guidelines      Special Instructions:      Bring a list of CURRENT medications.  Follow instructions from the office regarding Blood Thinners and/or Insulin  Follow instructions from the office regarding medications to take the morning of surgery.   Bring inhaler.  Bring CPAP machine.  Complete bowel prep per MD instructions.     Your Pacemaker/AICD needs to be interrogated within 6 months of your surgery. If not interrogated within this timeframe, your procedure will be canceled.     If you have a history of recreational drug use, you may be required to submit a urine sample for drug testing the day of your procedure, as some recreational drugs can interact with anesthetics and increase your surgical risk.    On day of surgery if you are running late, unable to make procedure time, or sick, please call the Pre-op department at [phone]    These surgical instructions were reviewed with the patient during the PAT phone call.

## 2025-07-28 NOTE — NURSE NAVIGATOR
Rounded on patient s/p right total knee replacement with Dr Estrada, dos 07/28/2025. Patient observed to be alert and oriented x 4 sitting up in bedside chair. She denies chest pain, shortness of breath,nausea, vomiting or calf pain. She denies any residual numbness in her right lower extremity. She has quadricept fire in her right leg. Ace wrap observed to her right lower extremity, dressing underneath observed to be clean, dry and intact with ice pack in place for comfort. Patient was able to ambulate to bedside chair with use of rolling walker and standby assist. She is currently tolerating food and beverage. Physical therapy will be notified at 12:30 to allow pain medication to set and and to allow patient time to complete lunch.      Patient did  attend the total joint class. All education was reviewed with patient at that time. She was provided with a total knee replacement book during class. Today patient was provided with a medication education sheet, medication schedule and frequently asked knee question hand out. Patient instructed that she may remove her ace wrap this evening. She was instructed that ace wrap may be reapplied during the daytime hours to assist with swelling in the foot and ankle region.        Reviewed the use of incentive spirometry. Patient was encouraged to use ten times hourly while in hospital to keep lungs expanded and free from complications. Patient was encouraged to continue use at home to prevent low grade fevers of  that occurs when lungs are not fully expanding. Patient was reminded that fevers greater than 101.3 that remain despite use of tylenol and incentive spirometer should be immediately reported to their surgeon.        Reviewed postoperative showering instructions. Patient was reminded that they may shower in two days, no tubs or submersion in water for a full six weeks. They were instructed that if ever the dressing becomes saturated or wet a call should be

## 2025-07-28 NOTE — PERIOP NOTE
TRANSFER - OUT REPORT:    Telephone report given to Niki on Diandra Gage  being transferred to 2212 for routine post-op       Report consisted of patient's Situation, Background, Assessment and   Recommendations(SBAR).     Information from the following report(s) Surgery Report and MAR was reviewed with the receiving nurse.           Lines:   Peripheral IV 07/28/25 Left Antecubital (Active)   Site Assessment Clean, dry & intact 07/28/25 0930   Line Status Infusing 07/28/25 0930   Line Care Connections checked and tightened 07/28/25 0930   Phlebitis Assessment No symptoms 07/28/25 0930   Infiltration Assessment 0 07/28/25 0930   Dressing Status Clean, dry & intact 07/28/25 0930   Dressing Type Transparent 07/28/25 0930        Opportunity for questions and clarification was provided.      Patient transported with:  O2 @ 3lpm and Tech

## 2025-07-28 NOTE — INTERVAL H&P NOTE
Update History & Physical    The patient's History and Physical of July 28, 2025 was reviewed with the patient and I examined the patient. There was no change. The surgical site was confirmed by the patient and me.     Plan: The risks, benefits, expected outcome, and alternative to the recommended procedure have been discussed with the patient. Patient understands and wants to proceed with the procedure.     Electronically signed by JANEL MARTINEZ MD on 7/28/2025 at 6:50 AM

## 2025-07-28 NOTE — ANESTHESIA PRE PROCEDURE
Department of Anesthesiology  Preprocedure Note       Name:  Diandra Gage   Age:  64 y.o.  :  1960                                          MRN:  [id number]         Date:  2025      Surgeon: Surgeon(s):  Shamir Estrada MD    Procedure: Procedure(s):  RIGHT TOTAL KNEE ARTHROPLASTY; BOYKIN TO ASSIST; [SMITH&NEPHEW ORTHO]; NERVE BLOCK; ERAS    Medications prior to admission:   Prior to Admission medications    Medication Sig Start Date End Date Taking? Authorizing Provider   fluticasone (FLONASE) 50 MCG/ACT nasal spray 1 spray by Each Nostril route daily as needed for Rhinitis   Yes Provider, MD Devora   Multiple Vitamin (MULTIVITAMIN) TABS tablet Take 1 tablet by mouth daily   Yes Devora Toth MD   metFORMIN (GLUCOPHAGE) 1000 MG tablet Take 0.5 tablets by mouth 2 times daily (with meals)   Yes Automatic Reconciliation, Ar   rosuvastatin (CRESTOR) 10 MG tablet Take 1 tablet by mouth   Yes Automatic Reconciliation, Ar   celecoxib (CELEBREX) 200 MG capsule TAKE 1 CAPSULE BY MOUTH TWICE DAILY  Patient taking differently: 100 mg daily 24   Alli Boykin PA-C   Tirzepatide (MOUNJARO) 2.5 MG/0.5ML SOPN SC injection Inject 0.5 mLs into the skin once a week    ProviderDevora MD   JARDIANCE 25 MG tablet Take 1 tablet by mouth daily 23   ProviderDevora MD       Current medications:    Current Facility-Administered Medications   Medication Dose Route Frequency Provider Last Rate Last Admin   • ceFAZolin (ANCEF) 2,000 mg in sterile water 20 mL IV syringe  2,000 mg IntraVENous On Call to OR Alli Boykin PA-C       • sodium chloride (PF) 0.9 % 25 mL with BUPivacaine (MARCAINE) 0.5 % 62.5 mg, BUPivacaine liposome (EXPAREL) 133 mg, EPINEPHrine 0.25 mg, ketorolac (TORADOL) 15 MG/ML 15 mg   Intra-artICUlar Once Alli Boykin PA-C       • tranexamic acid (CYKLOKAPRON) 1,000 mg in sodium chloride 0.9 % 110 mL IVPB (addEASE)  1,000 mg IntraVENous Q2H Alli Boykin PA-C       •

## 2025-07-28 NOTE — PERIOP NOTE
Patient /Family /Designee has been informed that [facility] is not responsible for patient belongings per policy and the signed [facility] Patient Agreement document.  Personal items should be sent home or checked in with security.  Patient /Family /Designee selected the following action:                            [x]  Send personal items home with a family member or friend                                                 []  Check in personal items with security, excluding clothing                            []  Maintain personal items at the bedside, against recommendation                                 by Adeel Bowling [facility]         All belongings taken by

## 2025-07-28 NOTE — ANESTHESIA PROCEDURE NOTES
Peripheral Block    Patient location during procedure: holding area  Reason for block: post-op pain management and at surgeon's request  Start time: 7/28/2025 7:09 AM  End time: 7/28/2025 7:13 AM  Staffing  Anesthesiologist: Wilfredo Barrera MD  Performed by: Wilfredo Barrera MD  Authorized by: Wilfredo Barrera MD    Preanesthetic Checklist  Completed: patient identified, IV checked, site marked, risks and benefits discussed, surgical/procedural consents, equipment checked, timeout performed, anesthesia consent given, oxygen available, monitors applied/VS acknowledged and fire risk safety assessment completed and verbalized  Peripheral Block   Patient position: supine  Prep: ChloraPrep  Patient monitoring: cardiac monitor, continuous pulse ox, oxygen, responsive to questions and frequent blood pressure checks  Block type: Femoral  Laterality: right  Injection technique: single-shot  Guidance: nerve stimulator and ultrasound guided  Local infiltration: ropivacaine  Local infiltration: ropivacaine    Needle   Needle type: insulated echogenic nerve stimulator needle   Needle gauge: 22 G  Needle localization: nerve stimulator and ultrasound guidance  Assessment   Injection assessment: negative aspiration for heme, no paresthesia on injection, local visualized surrounding nerve on ultrasound and no intravascular symptoms  Hemodynamics: stable  Outcomes: uncomplicated    Medications Administered  lidocaine injection 2% - IntraDERmal   3 mL - 7/28/2025 7:09:00 AM  ropivacaine (NAROPIN) injection 0.5% - Perineural   30 mL - 7/28/2025 7:12:00 AM

## 2025-07-28 NOTE — ANESTHESIA POSTPROCEDURE EVALUATION
Department of Anesthesiology  Postprocedure Note    Patient: Diandra Gage  MRN: [id number]  YOB: 1960  Date of evaluation: 7/28/2025    Procedure Summary       Date: 07/28/25 Room / Location: [facility] MAIN 07 / [facility] MAIN OR    Anesthesia Start: 0724 Anesthesia Stop: 0933    Procedure: Right total knee replacement. (Right: Knee) Diagnosis:       Osteoarthritis of right knee      (Osteoarthritis of right knee [M17.11])    Surgeons: Shamir Estrada MD Responsible Provider: Wilfredo Barrera MD    Anesthesia Type: General ASA Status: 2            Anesthesia Type: General    Glenna Phase I: Glenna Score: 5    Glenna Phase II:      Anesthesia Post Evaluation    Patient location during evaluation: bedside  Patient participation: complete - patient participated  Level of consciousness: awake  Pain score: 5  Airway patency: patent  Nausea & Vomiting: no nausea  Cardiovascular status: hemodynamically stable  Respiratory status: acceptable  Hydration status: euvolemic  Pain management: satisfactory to patient        No notable events documented.

## 2025-07-29 NOTE — OP NOTE
[facility]- [id number] Right 1 Implanted         Drains: * No LDAs found *    Findings:  Present At Time Of Surgery (PATOS) (choose all levels that have infection present):  No infection present  Other Findings: Same    Detailed Description of Procedure:   JR Beltrán was the first assistant assisted with all phases of the surgery commencing with patient positioning patient prep patient drape leg positioning during surgery retracting assisting with the surgery itself closure dressing placement and transfer    Patient had a fairly stiff knee with at least a 15 degree fixed flexion deformity some ankylosis and flexion about 110 degrees antibiotics confirmed given with the timeout.    Midline incision knee debrided in usual fashion an extra 2 was taken off the distal femur initially helped correct the fixed flexion deformity we pay particular attention to femoral rotation modified gap balancing technique would be performed a graft clot was utilized prevent undercuts and I will check for CHECK for shortness of squaring is all soft tissue structures protected during Memo process    After pulmonary femoral cuts we switched her attention to the tibia use the external alignment guide with appropriate landmarks and resect enough to get a good cleanup cut while protecting neurovascular structures ensuring no skive we then remove posterior osteophytes will protect the neurovascular bundle and use the octamoxin Exparel cocktail posteriorly modified gap balancing technique confirmed correct femoral rotation we are still stiff in extension we took an extra 2 off the distal femur and we then did our femoral finishing femoral placement with the trial components to set our tibial rotation which was marginal and we punched    We then resurfaced the patella restoring patella thickness anatomically and using a rongeur to smooth out the edges with all the trial components in place we checked the overall

## 2025-07-31 ENCOUNTER — TELEPHONE (OUTPATIENT)
Facility: HOSPITAL | Age: 65
End: 2025-07-31

## 2025-07-31 NOTE — TELEPHONE ENCOUNTER
Call placed to patient, ID verified x 2. Patient is s/p right total knee replacement with Dr Estrada, dos 07/28/2025. She denies chest pain, shortness of breath, nausea, vomiting, fever, chills or calf pain. She denies any residual numbness in her right lower extremity, she denies any difficulty with bowel or bladder.          She reports that her pain has been well controlled with medication that she is taking as prescribed. She is icing to assist with pain and swelling.           She states that she has a great deal of bruising , but that she knew that was expected.           She is ambulating hourly with her walker, working on her quad sets and bending her knee hourly.             Her dressing is described as dry and intact with just a small amount of drainage at the bottom.            The only concern she has is that her home health agency reports that they cannot see her because her chart has not been uploaded. Perfect serve sent to  to assist with this. Patient reassured that coordinator will call back once this task is completed.          Overall patient feels she is doing well. She has no questions or concerns at this time. She will follow up with Dr. Estrada in two weeks or sooner if needed.

## 2025-07-31 NOTE — CARE COORDINATION
07/28/25 1205   IMM Letter   Observation Status Letter date given: 07/28/25   Observation Status Letter time given: 1143   Observation Status Letter given to Patient/Family/Significant other/Guardian/POA/by: tank sprague       
Chart reviewed and spoke with pt at bedside. HIPAA/Demographics verified, pt states she has a home rolling walker for discharge, Freedom of Choice explained and offered, pt selected [facility], referral placed, pending accepting agency at this time.   07/28/25 1206   Service Assessment   Patient Orientation Alert and Oriented   Cognition Alert   History Provided By Patient   Primary Caregiver Self   Support Systems Spouse/Significant Other;Children   Patient's Healthcare Decision Maker is: Patient Declined (Legal Next of Kin Remains as Decision Maker)   PCP Verified by CM Yes   Last Visit to PCP Within last 3 months   Prior Functional Level Independent in ADLs/IADLs   Current Functional Level Independent in ADLs/IADLs   Can patient return to prior living arrangement Yes   Ability to make needs known: Good   Family able to assist with home care needs: Yes   Would you like for me to discuss the discharge plan with any other family members/significant others, and if so, who? No   Financial Resources Other (Comment)  ()   Community Resources None   CM/SW Referral   (discharge planning)   Social/Functional History   Lives With Spouse   Type of Home House   Home Layout Two level   Home Access Stairs to enter with rails   Entrance Stairs - Number of Steps 1   Bathroom Shower/Tub Tub/Shower unit   Bathroom Toilet Standard   Bathroom Equipment Grab bars in shower;Shower chair   Bathroom Accessibility Accessible   Home Equipment Walker - Rolling;Cane   Receives Help From Family   Prior Level of Assist for ADLs Independent   Prior Level of Assist for Homemaking Independent   Ambulation Assistance Independent   Prior Level of Assist for Transfers Independent   Active  Yes   Mode of Transportation Truck   Occupation Part time employment   Type of Occupation [employer] contract/[employer]   Discharge Planning   Type of Residence House   Living Arrangements Spouse/Significant Other   Current Services Prior To Admission 
Cm received messaged from Rambo Soto stating that patient informed her that her home health agency can see her because her chart has not been uplaoded, cm reached out to [facility] spoke with Treva , cm was informed that all clinicals has been received agency is waiting for pt to be outsourced to come to her home, during phone conversation Treva states she sent out a email to [facility]  to speed up process , cody provided [facility] with Rambo Soto contact information to follow up with if for some reason if their agency can not meet their obligation to arrange outpatient services.  
[facility] unable to accept pt, Pt selected [facility] agency and has been accepted, anticipated start of care 24-48hrs  
should be removed nightly. Education provided to patient regarding pain and swelling. Patient educated that pain and swelling typically increase postoperative day two and three and return to baseline by day four. Patient was educated that swelling and bruising is normal , in fact that swelling can continue for up to 3 months postoperatively. Patient was given opportunity to ask questions after instruction. All questions were answered.        Patient arrived to class ambulatory  reports that she ambulated within her home as well as outside her home independently, but that she does not ambulate often, only enough to compete tasks. She denies any falls within the past three months. She lives in a two story home with 2 steps to enter. Her bedroom is upstairs requiring 13 steps to reach. In her home she has a walker with two wheels, a walk in shower as well as a tub shower combinator. She states that the last time she had her knee replaced during covid she did not wake up until 9 pm . Patient Is very sensitive to anesthesia as well as narcotics. Patient was encouraged to remind Dr. Estrada as well as inform the anesthesia team the morning of surgery.

## 2025-08-14 ENCOUNTER — OFFICE VISIT (OUTPATIENT)
Age: 65
End: 2025-08-14

## 2025-08-14 DIAGNOSIS — Z96.651 STATUS POST RIGHT KNEE REPLACEMENT: Primary | ICD-10-CM

## 2025-08-14 DIAGNOSIS — Z48.02 ENCOUNTER FOR STAPLE REMOVAL: ICD-10-CM

## 2025-08-14 DIAGNOSIS — G89.18 POST-OP PAIN: ICD-10-CM

## 2025-08-14 PROCEDURE — 99024 POSTOP FOLLOW-UP VISIT: CPT | Performed by: PHYSICIAN ASSISTANT

## 2025-08-14 RX ORDER — OXYCODONE HYDROCHLORIDE 5 MG/1
5 TABLET ORAL EVERY 4 HOURS PRN
Qty: 42 TABLET | Refills: 0 | Status: SHIPPED | OUTPATIENT
Start: 2025-08-14 | End: 2025-08-21

## 2025-08-25 ENCOUNTER — TELEPHONE (OUTPATIENT)
Facility: HOSPITAL | Age: 65
End: 2025-08-25

## 2025-08-27 ENCOUNTER — HOSPITAL ENCOUNTER (OUTPATIENT)
Facility: HOSPITAL | Age: 65
Setting detail: RECURRING SERIES
Discharge: HOME | End: 2025-08-30
Payer: OTHER GOVERNMENT

## 2025-08-27 PROCEDURE — 97110 THERAPEUTIC EXERCISES: CPT

## 2025-08-27 PROCEDURE — 97161 PT EVAL LOW COMPLEX 20 MIN: CPT

## 2025-08-28 ENCOUNTER — OFFICE VISIT (OUTPATIENT)
Age: 65
End: 2025-08-28

## 2025-08-28 DIAGNOSIS — G89.18 POST-OP PAIN: ICD-10-CM

## 2025-08-28 DIAGNOSIS — Z96.651 STATUS POST RIGHT KNEE REPLACEMENT: Primary | ICD-10-CM

## 2025-08-28 DIAGNOSIS — R60.0 EDEMA OF RIGHT LOWER LEG: ICD-10-CM

## 2025-08-28 PROCEDURE — 99024 POSTOP FOLLOW-UP VISIT: CPT | Performed by: PHYSICIAN ASSISTANT

## 2025-08-28 RX ORDER — OXYCODONE HYDROCHLORIDE 5 MG/1
5 TABLET ORAL EVERY 4 HOURS PRN
Qty: 42 TABLET | Refills: 0 | Status: SHIPPED | OUTPATIENT
Start: 2025-08-28 | End: 2025-09-04

## 2025-08-29 ENCOUNTER — HOSPITAL ENCOUNTER (OUTPATIENT)
Facility: HOSPITAL | Age: 65
Setting detail: RECURRING SERIES
Discharge: HOME | End: 2025-09-01
Payer: OTHER GOVERNMENT

## 2025-08-29 PROCEDURE — 97110 THERAPEUTIC EXERCISES: CPT

## 2025-08-29 PROCEDURE — 97016 VASOPNEUMATIC DEVICE THERAPY: CPT

## 2025-08-29 PROCEDURE — 97140 MANUAL THERAPY 1/> REGIONS: CPT

## 2025-08-29 PROCEDURE — 97535 SELF CARE MNGMENT TRAINING: CPT

## 2025-08-29 PROCEDURE — 97112 NEUROMUSCULAR REEDUCATION: CPT

## 2025-09-03 ENCOUNTER — HOSPITAL ENCOUNTER (OUTPATIENT)
Facility: HOSPITAL | Age: 65
Setting detail: RECURRING SERIES
Discharge: HOME | End: 2025-09-06
Payer: OTHER GOVERNMENT

## 2025-09-03 PROCEDURE — 97535 SELF CARE MNGMENT TRAINING: CPT

## 2025-09-03 PROCEDURE — 97110 THERAPEUTIC EXERCISES: CPT

## 2025-09-03 PROCEDURE — 97140 MANUAL THERAPY 1/> REGIONS: CPT

## 2025-09-04 ENCOUNTER — TELEPHONE (OUTPATIENT)
Facility: HOSPITAL | Age: 65
End: 2025-09-04

## 2025-09-05 ENCOUNTER — TELEPHONE (OUTPATIENT)
Facility: HOSPITAL | Age: 65
End: 2025-09-05

## (undated) DEVICE — APPLICATOR MEDICATED 26 CC SOLUTION HI LT ORNG CHLORAPREP

## (undated) DEVICE — 3M™ STERI-DRAPE™ INSTRUMENT POUCH 1018: Brand: STERI-DRAPE™

## (undated) DEVICE — BLANKET WRM AD W50XL85.8IN PACU FULL BODY FORC AIR

## (undated) DEVICE — GLOVE SURG SZ 85 CRM LTX FREE POLYISOPRENE POLYMER BEAD ANTI

## (undated) DEVICE — SOLUTION IRRIG 3000ML 0.9% SOD CHL FLX CONT 0797208] ICU MEDICAL INC]

## (undated) DEVICE — GLOVE SURG SZ 65 L12IN FNGR THK79MIL GRN LTX FREE

## (undated) DEVICE — SOLUTION IV 1000ML 0.9% SOD CHL

## (undated) DEVICE — WEREWOLF FASTSEAL 6.0 HEMOSTASIS WAND: Brand: FASTSEAL 6.0 HEMOSTASIS WAND

## (undated) DEVICE — Device

## (undated) DEVICE — BNDG,ELSTC,MATRIX,STRL,6"X5YD,LF,HOOK&LP: Brand: MEDLINE

## (undated) DEVICE — PREMIUM DRY TRAY LF: Brand: MEDLINE INDUSTRIES, INC.

## (undated) DEVICE — HANDPIECE SET WITH HIGH FLOW TIP AND SUCTION TUBE: Brand: INTERPULSE

## (undated) DEVICE — NEEDLE HYPO 21GA L1.5IN INTRAMUSCULAR S STL LATCH BVL UP

## (undated) DEVICE — REM POLYHESIVE ADULT PATIENT RETURN ELECTRODE: Brand: VALLEYLAB

## (undated) DEVICE — SUTURE VICRYL SZ 0 L27IN ABSRB UD L36MM CT-1 1/2 CIR J260H

## (undated) DEVICE — INTENDED FOR TISSUE SEPARATION, AND OTHER PROCEDURES THAT REQUIRE A SHARP SURGICAL BLADE TO PUNCTURE OR CUT.: Brand: BARD-PARKER SAFETY BLADES SIZE 10, STERILE

## (undated) DEVICE — SUTURE ABSORBABLE ANTIBACT 1-0 CT-1 24 IN STRATAFIX PDS + SXPP1A443

## (undated) DEVICE — DISPOSABLE MULTI BAG ADAPTERS Y                                    TUBING, STERILE, 2 TO A SET 6 SETS                                    PER BOX

## (undated) DEVICE — BOWL AND CEMENT CARTRIDGE WITH BREAKAWAY FEMORAL NOZZLE: Brand: ACM

## (undated) DEVICE — TAPE,CLOTH/SILK,CURAD,3"X10YD,LF,40/CS: Brand: CURAD

## (undated) DEVICE — GOWN ,SIRUS ,NONREINFORCED 4XL: Brand: MEDLINE

## (undated) DEVICE — BLADE,STAINLESS-STEEL,15,STRL,DISPOSABLE: Brand: MEDLINE

## (undated) DEVICE — STRYKER PERFORMANCE SERIES SAGITTAL BLADE: Brand: STRYKER PERFORMANCE SERIES

## (undated) DEVICE — PREP SKN CHLRAPRP 26ML TNT -- CONVERT TO ITEM 373320

## (undated) DEVICE — KIT CLN UP BON SECOURS MARYV

## (undated) DEVICE — SKIN MARKER,REGULAR TIP WITH RULER AND LABELS: Brand: DEVON

## (undated) DEVICE — ELECTRODE PT RET AD L9FT HI MOIST COND ADH HYDRGEL CORDED

## (undated) DEVICE — CLEAN UP KIT: Brand: MEDLINE INDUSTRIES, INC.

## (undated) DEVICE — GLOVE SURG SZ 65 THK91MIL LTX FREE SYN POLYISOPRENE

## (undated) DEVICE — 4-PORT MANIFOLD: Brand: NEPTUNE 2

## (undated) DEVICE — BIPOLAR SEALER 23-112-1 AQM 6.0: Brand: AQUAMANTYS ®

## (undated) DEVICE — PACK SURG BSHR TOT KNEE LF

## (undated) DEVICE — NEEDLE SPNL 22GA L3.5IN BLK HUB S STL REG WALL FIT STYL W/

## (undated) DEVICE — BLADE RMFG DBL RECIP DBL SD --

## (undated) DEVICE — NEEDLE HYPO 18 GAX1.5 IN REG SAFETY PLAS HUB PNK DISP

## (undated) DEVICE — BLADE ES L2.75IN ELASTOMERIC COAT DURABLE BEND UPTO 90DEG

## (undated) DEVICE — SUTURE MCRYL SZ 2-0 L36IN ABSRB UD L36MM CT-1 1/2 CIR Y945H

## (undated) DEVICE — GENESIS TROCHLEAR PIN 1/8 X 3: Brand: GENESIS

## (undated) DEVICE — SYRINGE MED 3ML NDL 22GA L1 1/2IN REG BVL SFGLDE

## (undated) DEVICE — ZIMMER® STERILE DISPOSABLE TOURNIQUET CUFF WITH PLC, DUAL PORT, SINGLE BLADDER, 34 IN. (86 CM)

## (undated) DEVICE — HYPODERMIC SAFETY NEEDLE: Brand: MAGELLAN

## (undated) DEVICE — Z DISCONTINUED BY MEDLINE USE 2711682 TRAY SKIN PREP DRY W/ PREM GLV

## (undated) DEVICE — BLADE,STAINLESS-STEEL,10,STRL,DISPOSABLE: Brand: MEDLINE

## (undated) DEVICE — SPIROMETER INCENT 2500ML W ONE W VLV

## (undated) DEVICE — PACK SURG CUST BSHR TOT KNEE LF MMC

## (undated) DEVICE — SMARTSLEEVE SURGICAL GOWN, 3XL LONG: Brand: CONVERTORS

## (undated) DEVICE — GLOVE SURG SZ 85 L12IN FNGR THK79MIL GRN LTX FREE

## (undated) DEVICE — NEEDLE HYPO 18GA L1.5IN PNK S STL HUB POLYPR SHLD REG BVL

## (undated) DEVICE — INTENDED FOR TISSUE SEPARATION, AND OTHER PROCEDURES THAT REQUIRE A SHARP SURGICAL BLADE TO PUNCTURE OR CUT.: Brand: BARD-PARKER ® STAINLESS STEEL BLADES

## (undated) DEVICE — SUTURE MONOCRYL SZ 2-0 L36IN ABSRB UD L36MM CT-1 1/2 CIR Y945H

## (undated) DEVICE — EVACUATOR SMOKE L 10 FT SMOKE MANAGEMENT EXTENDED EDGE ELECTRODE STERILE DISP

## (undated) DEVICE — Z DISCONTINUED USE 2744636  DRESSING AQUACEL 14 IN ALG W3.5XL14IN POLYUR FLM CVR W/ HYDRCOLL

## (undated) DEVICE — (D)PACK ICE DISP -- DISC BY MFR

## (undated) DEVICE — GOWN,REINFORCED,POLY,AURORA,XXLARGE,STR: Brand: MEDLINE

## (undated) DEVICE — DRESSING HYDROFIBER AQUACEL AG ADVANTAGE 3.5X14 IN

## (undated) DEVICE — GLOVE SURG SZ 7 CRM LTX FREE POLYISOPRENE POLYMER BEAD ANTI

## (undated) DEVICE — UNDERGLOVE SURG SZ 7.5 BLU LTX FREE SYN POLYISOPRENE

## (undated) DEVICE — SOLUTION 1000ML NRML NACL

## (undated) DEVICE — SPONGE,LAP,18"X18",STD,XR,ST,5/PK,40PK/C: Brand: MEDLINE

## (undated) DEVICE — SUTURE VICRYL SZ 0 L36IN ABSRB UD L36MM CT-1 1/2 CIR J946H

## (undated) DEVICE — SOLUTION IRRIG 1000ML 0.9% SOD CHL USP POUR PLAS BTL

## (undated) DEVICE — SUTURE VCRL SZ 0 L36IN ABSRB UD L36MM CT-1 1/2 CIR J946H

## (undated) DEVICE — SUTURE VCRL SZ 2 L27IN ABSRB VLT L65MM TP-1 1/2 CIR J649G

## (undated) DEVICE — SYR LR LCK 1ML GRAD NSAF 30ML --

## (undated) DEVICE — SUTURE VICRYL SZ 2 L27IN ABSRB VLT L65MM TP-1 1/2 CIR J649G

## (undated) DEVICE — HOOK LOCK LATEX FREE ELASTIC BANDAGE 6INX5YD

## (undated) DEVICE — NEEDLE SPNL L3.5IN PNK HUB S STL REG WALL FIT STYL W/ QNCKE

## (undated) DEVICE — BLUNTFILL: Brand: MONOJECT

## (undated) DEVICE — T5 HOOD WITH PEEL AWAY FACE SHIELD

## (undated) DEVICE — DECANTER BAG 9": Brand: MEDLINE INDUSTRIES, INC.

## (undated) DEVICE — GLOVE SURG SZ 8 CRM LTX FREE POLYISOPRENE POLYMER BEAD ANTI

## (undated) DEVICE — SOFT SILICONE HYDROCELLULAR SACRUM DRESSING WITH LOCK AWAY LAYER: Brand: ALLEVYN LIFE SACRUM (LARGE) PACK OF 10

## (undated) DEVICE — SYRINGE MED 30ML STD CLR PLAS LUERLOCK TIP N CTRL DISP